# Patient Record
Sex: MALE | Race: WHITE | Employment: OTHER | URBAN - METROPOLITAN AREA
[De-identification: names, ages, dates, MRNs, and addresses within clinical notes are randomized per-mention and may not be internally consistent; named-entity substitution may affect disease eponyms.]

---

## 2017-01-05 ENCOUNTER — OFFICE VISIT (OUTPATIENT)
Dept: CARDIOLOGY CLINIC | Age: 77
End: 2017-01-05

## 2017-01-05 VITALS
SYSTOLIC BLOOD PRESSURE: 138 MMHG | HEART RATE: 82 BPM | RESPIRATION RATE: 16 BRPM | HEIGHT: 67 IN | DIASTOLIC BLOOD PRESSURE: 82 MMHG | BODY MASS INDEX: 32.83 KG/M2 | WEIGHT: 209.2 LBS

## 2017-01-05 DIAGNOSIS — J90 PLEURAL EFFUSION: ICD-10-CM

## 2017-01-05 DIAGNOSIS — R06.02 SOB (SHORTNESS OF BREATH) ON EXERTION: Primary | ICD-10-CM

## 2017-01-05 DIAGNOSIS — I10 ESSENTIAL HYPERTENSION: ICD-10-CM

## 2017-01-05 DIAGNOSIS — Z95.1 S/P CABG X 3: ICD-10-CM

## 2017-01-05 DIAGNOSIS — E11.9 CONTROLLED TYPE 2 DIABETES MELLITUS WITHOUT COMPLICATION, WITHOUT LONG-TERM CURRENT USE OF INSULIN (HCC): ICD-10-CM

## 2017-01-05 DIAGNOSIS — H18.519 FUCHS' CORNEAL DYSTROPHY: ICD-10-CM

## 2017-01-05 DIAGNOSIS — E11.40 TYPE 2 DIABETES, CONTROLLED, WITH NEUROPATHY (HCC): ICD-10-CM

## 2017-01-05 DIAGNOSIS — Z95.2 S/P AVR (AORTIC VALVE REPLACEMENT): ICD-10-CM

## 2017-01-05 DIAGNOSIS — R00.0 TACHYCARDIA: ICD-10-CM

## 2017-01-05 PROCEDURE — 93000 ELECTROCARDIOGRAM COMPLETE: CPT | Performed by: INTERNAL MEDICINE

## 2017-01-05 PROCEDURE — 99214 OFFICE O/P EST MOD 30 MIN: CPT | Performed by: INTERNAL MEDICINE

## 2017-03-30 ENCOUNTER — OFFICE VISIT (OUTPATIENT)
Dept: INTERNAL MEDICINE CLINIC | Age: 77
End: 2017-03-30

## 2017-03-30 VITALS
HEART RATE: 60 BPM | BODY MASS INDEX: 31.39 KG/M2 | SYSTOLIC BLOOD PRESSURE: 124 MMHG | RESPIRATION RATE: 16 BRPM | WEIGHT: 200 LBS | HEIGHT: 67 IN | DIASTOLIC BLOOD PRESSURE: 70 MMHG

## 2017-03-30 DIAGNOSIS — I10 ESSENTIAL HYPERTENSION: ICD-10-CM

## 2017-03-30 DIAGNOSIS — E78.00 HYPERCHOLESTEREMIA: ICD-10-CM

## 2017-03-30 DIAGNOSIS — J41.0 SIMPLE CHRONIC BRONCHITIS (HCC): ICD-10-CM

## 2017-03-30 DIAGNOSIS — E11.40 TYPE 2 DIABETES, CONTROLLED, WITH NEUROPATHY (HCC): Primary | ICD-10-CM

## 2017-03-30 DIAGNOSIS — I25.810 CORONARY ARTERY DISEASE INVOLVING CORONARY BYPASS GRAFT OF NATIVE HEART WITHOUT ANGINA PECTORIS: ICD-10-CM

## 2017-03-30 DIAGNOSIS — R09.81 NASAL CONGESTION: ICD-10-CM

## 2017-03-30 DIAGNOSIS — E11.40 TYPE 2 DIABETES, CONTROLLED, WITH NEUROPATHY (HCC): ICD-10-CM

## 2017-03-30 PROBLEM — J90 PLEURAL EFFUSION: Status: RESOLVED | Noted: 2017-01-05 | Resolved: 2017-03-30

## 2017-03-30 PROBLEM — R06.02 SOB (SHORTNESS OF BREATH) ON EXERTION: Status: RESOLVED | Noted: 2017-01-05 | Resolved: 2017-03-30

## 2017-03-30 PROBLEM — R00.0 TACHYCARDIA: Status: RESOLVED | Noted: 2017-01-05 | Resolved: 2017-03-30

## 2017-03-30 LAB
A/G RATIO: 1.7 (ref 1.1–2.2)
ALBUMIN SERPL-MCNC: 4.3 G/DL (ref 3.4–5)
ALP BLD-CCNC: 76 U/L (ref 40–129)
ALT SERPL-CCNC: 20 U/L (ref 10–40)
ANION GAP SERPL CALCULATED.3IONS-SCNC: 14 MMOL/L (ref 3–16)
AST SERPL-CCNC: 25 U/L (ref 15–37)
BASOPHILS ABSOLUTE: 0.1 K/UL (ref 0–0.2)
BASOPHILS RELATIVE PERCENT: 1.2 %
BILIRUB SERPL-MCNC: 1 MG/DL (ref 0–1)
BUN BLDV-MCNC: 14 MG/DL (ref 7–20)
CALCIUM SERPL-MCNC: 8.9 MG/DL (ref 8.3–10.6)
CHLORIDE BLD-SCNC: 97 MMOL/L (ref 99–110)
CHOLESTEROL, TOTAL: 103 MG/DL (ref 0–199)
CO2: 28 MMOL/L (ref 21–32)
CREAT SERPL-MCNC: 0.7 MG/DL (ref 0.8–1.3)
CREATININE URINE: 188.9 MG/DL (ref 39–259)
EOSINOPHILS ABSOLUTE: 0.2 K/UL (ref 0–0.6)
EOSINOPHILS RELATIVE PERCENT: 2.1 %
GFR AFRICAN AMERICAN: >60
GFR NON-AFRICAN AMERICAN: >60
GLOBULIN: 2.6 G/DL
GLUCOSE BLD-MCNC: 110 MG/DL (ref 70–99)
HCT VFR BLD CALC: 43 % (ref 40.5–52.5)
HDLC SERPL-MCNC: 46 MG/DL (ref 40–60)
HEMOGLOBIN: 13.9 G/DL (ref 13.5–17.5)
LDL CHOLESTEROL CALCULATED: 36 MG/DL
LYMPHOCYTES ABSOLUTE: 2.7 K/UL (ref 1–5.1)
LYMPHOCYTES RELATIVE PERCENT: 31.4 %
MCH RBC QN AUTO: 28.1 PG (ref 26–34)
MCHC RBC AUTO-ENTMCNC: 32.4 G/DL (ref 31–36)
MCV RBC AUTO: 86.8 FL (ref 80–100)
MICROALBUMIN UR-MCNC: <1.2 MG/DL
MICROALBUMIN/CREAT UR-RTO: NORMAL MG/G (ref 0–30)
MONOCYTES ABSOLUTE: 0.7 K/UL (ref 0–1.3)
MONOCYTES RELATIVE PERCENT: 8.6 %
NEUTROPHILS ABSOLUTE: 4.8 K/UL (ref 1.7–7.7)
NEUTROPHILS RELATIVE PERCENT: 56.7 %
PDW BLD-RTO: 18.5 % (ref 12.4–15.4)
PLATELET # BLD: 190 K/UL (ref 135–450)
PMV BLD AUTO: 7.5 FL (ref 5–10.5)
POTASSIUM SERPL-SCNC: 4.2 MMOL/L (ref 3.5–5.1)
RBC # BLD: 4.96 M/UL (ref 4.2–5.9)
SODIUM BLD-SCNC: 139 MMOL/L (ref 136–145)
TOTAL PROTEIN: 6.9 G/DL (ref 6.4–8.2)
TRIGL SERPL-MCNC: 103 MG/DL (ref 0–150)
VLDLC SERPL CALC-MCNC: 21 MG/DL
WBC # BLD: 8.5 K/UL (ref 4–11)

## 2017-03-30 PROCEDURE — 99214 OFFICE O/P EST MOD 30 MIN: CPT | Performed by: INTERNAL MEDICINE

## 2017-03-30 RX ORDER — CARVEDILOL 25 MG/1
25 TABLET ORAL 2 TIMES DAILY
Qty: 180 TABLET | Refills: 3 | Status: SHIPPED | OUTPATIENT
Start: 2017-03-30 | End: 2018-01-22 | Stop reason: SDUPTHER

## 2017-03-30 RX ORDER — DILTIAZEM HYDROCHLORIDE 120 MG/1
120 CAPSULE, COATED, EXTENDED RELEASE ORAL DAILY
Qty: 90 CAPSULE | Refills: 3 | Status: SHIPPED | OUTPATIENT
Start: 2017-03-30 | End: 2017-07-10 | Stop reason: SDUPTHER

## 2017-03-30 RX ORDER — ATORVASTATIN CALCIUM 40 MG/1
40 TABLET, FILM COATED ORAL DAILY
Qty: 30 TABLET | Refills: 11 | Status: SHIPPED | OUTPATIENT
Start: 2017-03-30 | End: 2018-03-29 | Stop reason: SDUPTHER

## 2017-03-30 RX ORDER — ALBUTEROL SULFATE 90 UG/1
2 AEROSOL, METERED RESPIRATORY (INHALATION) EVERY 6 HOURS PRN
Qty: 1 INHALER | Refills: 3
Start: 2017-03-30 | End: 2019-06-26 | Stop reason: SDUPTHER

## 2017-03-30 RX ORDER — FUROSEMIDE 40 MG/1
40 TABLET ORAL DAILY
Qty: 60 TABLET | Refills: 1 | Status: SHIPPED | OUTPATIENT
Start: 2017-03-30 | End: 2017-06-20 | Stop reason: SDUPTHER

## 2017-03-30 RX ORDER — IPRATROPIUM BROMIDE 21 UG/1
2 SPRAY, METERED NASAL 3 TIMES DAILY
Qty: 1 BOTTLE | Refills: 3 | Status: SHIPPED | OUTPATIENT
Start: 2017-03-30 | End: 2017-06-28 | Stop reason: ALTCHOICE

## 2017-03-31 LAB
ESTIMATED AVERAGE GLUCOSE: 131.2 MG/DL
HBA1C MFR BLD: 6.2 %

## 2017-06-20 RX ORDER — FUROSEMIDE 40 MG/1
40 TABLET ORAL DAILY
Qty: 90 TABLET | Refills: 1 | Status: SHIPPED | OUTPATIENT
Start: 2017-06-20 | End: 2018-01-27 | Stop reason: SDUPTHER

## 2017-06-28 ENCOUNTER — OFFICE VISIT (OUTPATIENT)
Dept: INTERNAL MEDICINE CLINIC | Age: 77
End: 2017-06-28

## 2017-06-28 VITALS
HEART RATE: 74 BPM | RESPIRATION RATE: 16 BRPM | OXYGEN SATURATION: 94 % | DIASTOLIC BLOOD PRESSURE: 80 MMHG | BODY MASS INDEX: 32.14 KG/M2 | WEIGHT: 205.2 LBS | SYSTOLIC BLOOD PRESSURE: 136 MMHG

## 2017-06-28 DIAGNOSIS — E11.40 TYPE 2 DIABETES, CONTROLLED, WITH NEUROPATHY (HCC): ICD-10-CM

## 2017-06-28 DIAGNOSIS — R41.3 MEMORY DEFICIT: ICD-10-CM

## 2017-06-28 DIAGNOSIS — I10 ESSENTIAL HYPERTENSION: ICD-10-CM

## 2017-06-28 DIAGNOSIS — J41.0 SIMPLE CHRONIC BRONCHITIS (HCC): ICD-10-CM

## 2017-06-28 DIAGNOSIS — R41.3 MEMORY DEFICIT: Primary | ICD-10-CM

## 2017-06-28 LAB
A/G RATIO: 1.6 (ref 1.1–2.2)
ALBUMIN SERPL-MCNC: 4.3 G/DL (ref 3.4–5)
ALP BLD-CCNC: 74 U/L (ref 40–129)
ALT SERPL-CCNC: 17 U/L (ref 10–40)
ANION GAP SERPL CALCULATED.3IONS-SCNC: 15 MMOL/L (ref 3–16)
AST SERPL-CCNC: 25 U/L (ref 15–37)
BILIRUB SERPL-MCNC: 1.1 MG/DL (ref 0–1)
BUN BLDV-MCNC: 13 MG/DL (ref 7–20)
CALCIUM SERPL-MCNC: 9.1 MG/DL (ref 8.3–10.6)
CHLORIDE BLD-SCNC: 96 MMOL/L (ref 99–110)
CO2: 28 MMOL/L (ref 21–32)
CREAT SERPL-MCNC: 0.7 MG/DL (ref 0.8–1.3)
GFR AFRICAN AMERICAN: >60
GFR NON-AFRICAN AMERICAN: >60
GLOBULIN: 2.7 G/DL
GLUCOSE BLD-MCNC: 101 MG/DL (ref 70–99)
POTASSIUM SERPL-SCNC: 4.2 MMOL/L (ref 3.5–5.1)
SODIUM BLD-SCNC: 139 MMOL/L (ref 136–145)
TOTAL PROTEIN: 7 G/DL (ref 6.4–8.2)
TSH REFLEX: 1.03 UIU/ML (ref 0.27–4.2)
VITAMIN B-12: 519 PG/ML (ref 211–911)

## 2017-06-28 PROCEDURE — 99214 OFFICE O/P EST MOD 30 MIN: CPT | Performed by: INTERNAL MEDICINE

## 2017-06-29 LAB
ESTIMATED AVERAGE GLUCOSE: 122.6 MG/DL
HBA1C MFR BLD: 5.9 %

## 2017-07-06 ENCOUNTER — HOSPITAL ENCOUNTER (OUTPATIENT)
Dept: CT IMAGING | Age: 77
Discharge: HOME | End: 2017-07-06
Attending: INTERNAL MEDICINE | Admitting: INTERNAL MEDICINE

## 2017-07-06 DIAGNOSIS — R41.3 MEMORY DEFICIT: ICD-10-CM

## 2017-07-10 ENCOUNTER — OFFICE VISIT (OUTPATIENT)
Dept: CARDIOLOGY CLINIC | Age: 77
End: 2017-07-10

## 2017-07-10 VITALS
SYSTOLIC BLOOD PRESSURE: 138 MMHG | DIASTOLIC BLOOD PRESSURE: 78 MMHG | BODY MASS INDEX: 32.65 KG/M2 | HEART RATE: 76 BPM | RESPIRATION RATE: 16 BRPM | WEIGHT: 208 LBS | HEIGHT: 67 IN

## 2017-07-10 DIAGNOSIS — I10 ESSENTIAL HYPERTENSION: ICD-10-CM

## 2017-07-10 DIAGNOSIS — Z95.1 S/P CABG X 3: ICD-10-CM

## 2017-07-10 DIAGNOSIS — Z95.2 S/P AVR (AORTIC VALVE REPLACEMENT): ICD-10-CM

## 2017-07-10 DIAGNOSIS — I25.10 CORONARY ARTERY DISEASE INVOLVING NATIVE CORONARY ARTERY OF NATIVE HEART WITHOUT ANGINA PECTORIS: Primary | ICD-10-CM

## 2017-07-10 DIAGNOSIS — E78.00 HYPERCHOLESTEREMIA: ICD-10-CM

## 2017-07-10 PROCEDURE — 99214 OFFICE O/P EST MOD 30 MIN: CPT | Performed by: INTERNAL MEDICINE

## 2017-07-10 RX ORDER — DILTIAZEM HYDROCHLORIDE 120 MG/1
120 CAPSULE, COATED, EXTENDED RELEASE ORAL DAILY
Qty: 90 CAPSULE | Refills: 3 | Status: SHIPPED | OUTPATIENT
Start: 2017-07-10 | End: 2018-09-21 | Stop reason: SDUPTHER

## 2017-07-12 ENCOUNTER — OFFICE VISIT (OUTPATIENT)
Dept: INTERNAL MEDICINE CLINIC | Age: 77
End: 2017-07-12

## 2017-07-12 VITALS
SYSTOLIC BLOOD PRESSURE: 124 MMHG | OXYGEN SATURATION: 95 % | DIASTOLIC BLOOD PRESSURE: 70 MMHG | HEART RATE: 77 BPM | RESPIRATION RATE: 16 BRPM

## 2017-07-12 DIAGNOSIS — R41.3 MEMORY DEFICIT: Primary | ICD-10-CM

## 2017-07-12 PROCEDURE — 99213 OFFICE O/P EST LOW 20 MIN: CPT | Performed by: INTERNAL MEDICINE

## 2017-07-12 RX ORDER — DONEPEZIL HYDROCHLORIDE 5 MG/1
5 TABLET, FILM COATED ORAL NIGHTLY
Qty: 90 TABLET | Refills: 0 | Status: SHIPPED | OUTPATIENT
Start: 2017-07-12 | End: 2017-10-08 | Stop reason: SDUPTHER

## 2017-09-20 DIAGNOSIS — K21.9 GASTROESOPHAGEAL REFLUX DISEASE WITHOUT ESOPHAGITIS: ICD-10-CM

## 2017-09-20 RX ORDER — FAMOTIDINE 20 MG/1
TABLET, FILM COATED ORAL
Qty: 180 TABLET | Refills: 3 | Status: SHIPPED | OUTPATIENT
Start: 2017-09-20 | End: 2018-09-13 | Stop reason: SDUPTHER

## 2017-10-08 DIAGNOSIS — R41.3 MEMORY DEFICIT: ICD-10-CM

## 2017-10-09 RX ORDER — DONEPEZIL HYDROCHLORIDE 5 MG/1
TABLET, FILM COATED ORAL
Qty: 90 TABLET | Refills: 0 | Status: SHIPPED | OUTPATIENT
Start: 2017-10-09 | End: 2017-10-10

## 2017-10-10 ENCOUNTER — OFFICE VISIT (OUTPATIENT)
Dept: INTERNAL MEDICINE CLINIC | Age: 77
End: 2017-10-10

## 2017-10-10 VITALS
OXYGEN SATURATION: 92 % | HEIGHT: 67 IN | DIASTOLIC BLOOD PRESSURE: 74 MMHG | HEART RATE: 86 BPM | BODY MASS INDEX: 33.59 KG/M2 | WEIGHT: 214 LBS | SYSTOLIC BLOOD PRESSURE: 138 MMHG

## 2017-10-10 DIAGNOSIS — E78.00 HYPERCHOLESTEREMIA: ICD-10-CM

## 2017-10-10 DIAGNOSIS — J41.0 SIMPLE CHRONIC BRONCHITIS (HCC): ICD-10-CM

## 2017-10-10 DIAGNOSIS — I10 ESSENTIAL HYPERTENSION: ICD-10-CM

## 2017-10-10 DIAGNOSIS — E11.9 CONTROLLED TYPE 2 DIABETES MELLITUS WITHOUT COMPLICATION, WITHOUT LONG-TERM CURRENT USE OF INSULIN (HCC): Primary | ICD-10-CM

## 2017-10-10 DIAGNOSIS — I25.10 CORONARY ARTERY DISEASE INVOLVING NATIVE CORONARY ARTERY OF NATIVE HEART WITHOUT ANGINA PECTORIS: ICD-10-CM

## 2017-10-10 PROCEDURE — G0008 ADMIN INFLUENZA VIRUS VAC: HCPCS | Performed by: INTERNAL MEDICINE

## 2017-10-10 PROCEDURE — 90662 IIV NO PRSV INCREASED AG IM: CPT | Performed by: INTERNAL MEDICINE

## 2017-10-10 PROCEDURE — 99214 OFFICE O/P EST MOD 30 MIN: CPT | Performed by: INTERNAL MEDICINE

## 2017-10-10 NOTE — PROGRESS NOTES
without long-term current use of insulin ([facility]    2. Simple chronic bronchitis ([facility]    3. Essential hypertension    4. Coronary artery disease involving native coronary artery of native heart without angina pectoris    5. Hypercholesteremia        PLAN:    Stefany Rodriguez was seen today for diabetes and memory loss. Diagnoses and all orders for this visit:    Controlled type 2 diabetes mellitus without complication, without long-term current use of insulin ([facility] - seems to be doing very well; check labs, no change  -     Hemoglobin A1C; Future  -     Lipid Panel; Future  -     Comprehensive Metabolic Panel; Future    Simple chronic bronchitis (HCC) - use albuterol PRN  -     Lipid Panel; Future  -     Comprehensive Metabolic Panel; Future    Essential hypertension - at goal; no change   -     Lipid Panel; Future  -     CBC Auto Differential; Future  -     Comprehensive Metabolic Panel; Future    Coronary artery disease involving native coronary artery of native heart without angina pectoris - no current symptoms; check labs  -     Lipid Panel; Future  -     Comprehensive Metabolic Panel; Future    Hypercholesteremia - check labs, cont statin  -     Lipid Panel; Future  -     Comprehensive Metabolic Panel;  Future    Other orders  -     INFLUENZA, HIGH DOSE, 65 YRS +, IM, PF, PREFILL SYR, 0.5ML (FLUZONE HD)

## 2017-10-16 LAB
A/G RATIO: 2 (CALC) (ref 0.8–2.6)
ALBUMIN SERPL-MCNC: 4.7 G/DL
ALBUMIN SERPL-MCNC: 4.7 GM/DL (ref 3.5–5.2)
ALP BLD-CCNC: 72 U/L
ALP BLD-CCNC: 72 U/L (ref 23–144)
ALT SERPL-CCNC: 28 U/L
ALT SERPL-CCNC: 28 U/L (ref 0–60)
ANION GAP SERPL CALCULATED.3IONS-SCNC: NORMAL MMOL/L
AST SERPL-CCNC: 32 U/L
AST SERPL-CCNC: 32 U/L (ref 0–55)
AVERAGE GLUCOSE: NORMAL
BASOPHILS ABSOLUTE: 0.1 /ΜL
BASOPHILS ABSOLUTE: 0.1 K/MM3 (ref 0–0.3)
BASOPHILS RELATIVE PERCENT: 1 %
BASOPHILS RELATIVE PERCENT: 1 % (ref 0–2)
BILIRUB SERPL-MCNC: 1.1 MG/DL (ref 0.1–1.4)
BILIRUB SERPL-MCNC: 1.1 MG/DL (ref 0–1.2)
BUN / CREAT RATIO: 18 (CALC) (ref 7–25)
BUN BLDV-MCNC: 14 MG/DL
BUN BLDV-MCNC: 14 MG/DL (ref 3–29)
CALCIUM SERPL-MCNC: 8.9 MG/DL
CALCIUM SERPL-MCNC: 8.9 MG/DL (ref 8.5–10.5)
CHLORIDE BLD-SCNC: 99 MEQ/L (ref 96–110)
CHLORIDE BLD-SCNC: 99 MMOL/L
CHOLESTEROL, TOTAL: 99 MG/DL
CHOLESTEROL, TOTAL: 99 MG/DL
CHOLESTEROL/HDL RATIO: NORMAL
CO2: 32 MEQ/L (ref 19–32)
CO2: 32 MMOL/L
COMMENT: ABNORMAL
COPY(IES) SENT TO:: NORMAL
CREAT SERPL-MCNC: 0.8 MG/DL
CREAT SERPL-MCNC: 0.8 MG/DL
EOSINOPHILS ABSOLUTE: 0.2 /ΜL
EOSINOPHILS ABSOLUTE: 0.2 K/MM3 (ref 0–0.6)
EOSINOPHILS RELATIVE PERCENT: 3.4 %
EOSINOPHILS RELATIVE PERCENT: 3.4 % (ref 0–7)
GFR CALCULATED: 86
GFR SERPL CREATININE-BSD FRML MDRD: 86 ML/MIN/1.73M2
GLOBULIN: 2.3 GM/DL (CALC) (ref 1.9–3.6)
GLUCOSE BLD-MCNC: 115 MG/DL
GLUCOSE BLD-MCNC: 115 MG/DL
HBA1C MFR BLD: 5.6 %
HBA1C MFR BLD: 5.6 % TOTAL HGB
HCT VFR BLD CALC: 43.2 % (ref 41–50)
HCT VFR BLD CALC: 43.2 % (ref 41–53)
HDLC SERPL-MCNC: 49 MG/DL
HDLC SERPL-MCNC: 49 MG/DL (ref 35–70)
HEMOGLOBIN: 14.2 G/DL (ref 13.5–17.5)
HEMOGLOBIN: 14.2 G/DL (ref 13.8–17.2)
LDL CHOLESTEROL CALCULATED: 30 MG/DL (ref 0–160)
LDL CHOLESTEROL: 30 MG/DL (CALC)
LEUKOCYTES, UA: 6.9 K/MM3 (ref 3.8–10.8)
LYMPHOCYTES ABSOLUTE: 2.5 /ΜL
LYMPHOCYTES ABSOLUTE: 2.5 K/MM3 (ref 0.9–4.1)
LYMPHOCYTES RELATIVE PERCENT: 36.9 %
LYMPHOCYTES RELATIVE PERCENT: 36.9 % (ref 14–51)
MCH RBC QN AUTO: 28.4 PG
MCH RBC QN AUTO: 28.4 PG (ref 27–33)
MCHC RBC AUTO-ENTMCNC: 32.9 G/DL
MCHC RBC AUTO-ENTMCNC: 32.9 G/DL (ref 32–36)
MCV RBC AUTO: 86.4 FL
MCV RBC AUTO: 86.4 FL (ref 80–100)
MONOCYTES ABSOLUTE: 0.7 /ΜL
MONOCYTES ABSOLUTE: 0.7 K/MM3 (ref 0.2–1.1)
MONOCYTES RELATIVE PERCENT: 9.6 %
MONOCYTES RELATIVE PERCENT: 9.6 % (ref 0–14)
NEUTROPHILS ABSOLUTE: 3.4 /ΜL
NEUTROPHILS ABSOLUTE: 3.4 K/MM3 (ref 1.5–7.8)
NEUTROPHILS RELATIVE PERCENT: 49.1 %
PDW BLD-RTO: 18.6 %
PDW BLD-RTO: 18.6 % (ref 9–15)
PLATELET # BLD: 198 K/MM3 (ref 130–400)
PLATELET # BLD: 198 K/ΜL
PMV BLD AUTO: NORMAL FL
POTASSIUM SERPL-SCNC: 4.3 MEQ/L (ref 3.4–5.3)
POTASSIUM SERPL-SCNC: 4.3 MMOL/L
RBC # BLD: 5 10^6/ΜL
RBC # BLD: 5 M/MM3 (ref 4.4–5.8)
SEGMENTED NEUTROPHILS RELATIVE PERCENT: 49.1 % (ref 40–76)
SODIUM BLD-SCNC: 141 MEQ/L (ref 135–148)
SODIUM BLD-SCNC: 141 MMOL/L
TOTAL PROTEIN: 7
TOTAL PROTEIN: 7 GM/DL (ref 6–8.3)
TRIGL SERPL-MCNC: 100 MG/DL
TRIGL SERPL-MCNC: 100 MG/DL
VLDLC SERPL CALC-MCNC: 20 MG/DL
VLDLC SERPL CALC-MCNC: 20 MG/DL (CALC) (ref 4–38)
WBC # BLD: 6.9 10^3/ML

## 2017-10-17 DIAGNOSIS — E11.9 CONTROLLED TYPE 2 DIABETES MELLITUS WITHOUT COMPLICATION, WITHOUT LONG-TERM CURRENT USE OF INSULIN (HCC): ICD-10-CM

## 2017-10-17 DIAGNOSIS — J41.0 SIMPLE CHRONIC BRONCHITIS (HCC): ICD-10-CM

## 2017-10-17 DIAGNOSIS — E78.00 HYPERCHOLESTEREMIA: ICD-10-CM

## 2017-10-17 DIAGNOSIS — I10 ESSENTIAL HYPERTENSION: ICD-10-CM

## 2017-10-17 DIAGNOSIS — I25.10 CORONARY ARTERY DISEASE INVOLVING NATIVE CORONARY ARTERY OF NATIVE HEART WITHOUT ANGINA PECTORIS: ICD-10-CM

## 2018-01-22 DIAGNOSIS — I25.810 CORONARY ARTERY DISEASE INVOLVING CORONARY BYPASS GRAFT OF NATIVE HEART WITHOUT ANGINA PECTORIS: ICD-10-CM

## 2018-01-22 DIAGNOSIS — I10 ESSENTIAL HYPERTENSION: ICD-10-CM

## 2018-01-22 RX ORDER — CARVEDILOL 25 MG/1
25 TABLET ORAL 2 TIMES DAILY
Qty: 180 TABLET | Refills: 3 | Status: SHIPPED | OUTPATIENT
Start: 2018-01-22 | End: 2018-07-23 | Stop reason: SDUPTHER

## 2018-01-23 ENCOUNTER — OFFICE VISIT (OUTPATIENT)
Dept: INTERNAL MEDICINE CLINIC | Age: 78
End: 2018-01-23

## 2018-01-23 VITALS
WEIGHT: 223.8 LBS | SYSTOLIC BLOOD PRESSURE: 124 MMHG | RESPIRATION RATE: 16 BRPM | BODY MASS INDEX: 35.05 KG/M2 | HEART RATE: 80 BPM | DIASTOLIC BLOOD PRESSURE: 70 MMHG | OXYGEN SATURATION: 95 %

## 2018-01-23 DIAGNOSIS — J01.00 ACUTE NON-RECURRENT MAXILLARY SINUSITIS: Primary | ICD-10-CM

## 2018-01-23 PROCEDURE — 99213 OFFICE O/P EST LOW 20 MIN: CPT | Performed by: INTERNAL MEDICINE

## 2018-01-23 RX ORDER — AMOXICILLIN 500 MG/1
500 CAPSULE ORAL 3 TIMES DAILY
Qty: 30 CAPSULE | Refills: 0 | Status: SHIPPED | OUTPATIENT
Start: 2018-01-23 | End: 2018-02-02

## 2018-01-23 ASSESSMENT — PATIENT HEALTH QUESTIONNAIRE - PHQ9
1. LITTLE INTEREST OR PLEASURE IN DOING THINGS: 0
SUM OF ALL RESPONSES TO PHQ QUESTIONS 1-9: 0
2. FEELING DOWN, DEPRESSED OR HOPELESS: 0
SUM OF ALL RESPONSES TO PHQ9 QUESTIONS 1 & 2: 0

## 2018-01-23 NOTE — PROGRESS NOTES
Izabelazenaida Lonnie  1940 01/23/18    SUBJECTIVE:    Pt with epistaxis intermittently over the last 3-4 weeks, nasal congestion, sinus headaches, ear congestion. Epistaxis has been from both nostrils. He denies cough, wheezing, SOB, F/C. He has used flonase. OBJECTIVE:    /70   Pulse 80   Resp 16   Wt 223 lb 12.8 oz (101.5 kg)   SpO2 95%   BMI 35.05 kg/m²     Physical Exam   Constitutional: He appears well-developed. HENT:   Right Ear: External ear normal.   Left Ear: External ear normal.   Nose: Nose normal.   Mouth/Throat: No oropharyngeal exudate. Eyes: Conjunctivae are normal.   Cardiovascular: Normal rate, regular rhythm and normal heart sounds. Pulmonary/Chest: Effort normal and breath sounds normal.   Lymphadenopathy:     He has no cervical adenopathy. Neurological: He is alert. ASSESSMENT:    1. Acute non-recurrent maxillary sinusitis        PLAN:    Kirk Torres was seen today for sinus problem. Diagnoses and all orders for this visit:    Acute non-recurrent maxillary sinusitis - Tx with amox, sinus rinse, zyrtec. Stop flonase  -     amoxicillin (AMOXIL) 500 MG capsule;  Take 1 capsule by mouth 3 times daily for 10 days

## 2018-01-29 RX ORDER — FUROSEMIDE 40 MG/1
TABLET ORAL
Qty: 90 TABLET | Refills: 1 | Status: SHIPPED | OUTPATIENT
Start: 2018-01-29 | End: 2018-07-27 | Stop reason: SDUPTHER

## 2018-03-29 DIAGNOSIS — I25.810 CORONARY ARTERY DISEASE INVOLVING CORONARY BYPASS GRAFT OF NATIVE HEART WITHOUT ANGINA PECTORIS: ICD-10-CM

## 2018-03-29 RX ORDER — ATORVASTATIN CALCIUM 40 MG/1
TABLET, FILM COATED ORAL
Qty: 90 TABLET | Refills: 3 | Status: SHIPPED | OUTPATIENT
Start: 2018-03-29 | End: 2019-03-27 | Stop reason: SDUPTHER

## 2018-04-11 ENCOUNTER — OFFICE VISIT (OUTPATIENT)
Dept: INTERNAL MEDICINE CLINIC | Age: 78
End: 2018-04-11

## 2018-04-11 VITALS
HEART RATE: 76 BPM | BODY MASS INDEX: 34.77 KG/M2 | DIASTOLIC BLOOD PRESSURE: 70 MMHG | SYSTOLIC BLOOD PRESSURE: 124 MMHG | RESPIRATION RATE: 16 BRPM | WEIGHT: 222 LBS | OXYGEN SATURATION: 98 %

## 2018-04-11 DIAGNOSIS — I25.10 CORONARY ARTERY DISEASE INVOLVING NATIVE CORONARY ARTERY OF NATIVE HEART WITHOUT ANGINA PECTORIS: ICD-10-CM

## 2018-04-11 DIAGNOSIS — E78.00 HYPERCHOLESTEREMIA: ICD-10-CM

## 2018-04-11 DIAGNOSIS — E11.40 TYPE 2 DIABETES, CONTROLLED, WITH NEUROPATHY (HCC): ICD-10-CM

## 2018-04-11 DIAGNOSIS — I10 ESSENTIAL HYPERTENSION: Primary | ICD-10-CM

## 2018-04-11 PROCEDURE — 99214 OFFICE O/P EST MOD 30 MIN: CPT | Performed by: INTERNAL MEDICINE

## 2018-04-23 DIAGNOSIS — I10 ESSENTIAL HYPERTENSION: ICD-10-CM

## 2018-04-23 DIAGNOSIS — E78.00 HYPERCHOLESTEREMIA: ICD-10-CM

## 2018-04-23 DIAGNOSIS — E11.40 TYPE 2 DIABETES, CONTROLLED, WITH NEUROPATHY (HCC): ICD-10-CM

## 2018-04-23 LAB
A/G RATIO: 1.8 (ref 1.1–2.2)
ALBUMIN SERPL-MCNC: 4.2 G/DL (ref 3.4–5)
ALP BLD-CCNC: 64 U/L (ref 40–129)
ALT SERPL-CCNC: 21 U/L (ref 10–40)
ANION GAP SERPL CALCULATED.3IONS-SCNC: 12 MMOL/L (ref 3–16)
AST SERPL-CCNC: 26 U/L (ref 15–37)
BASOPHILS ABSOLUTE: 0.1 K/UL (ref 0–0.2)
BASOPHILS RELATIVE PERCENT: 0.9 %
BILIRUB SERPL-MCNC: 1.2 MG/DL (ref 0–1)
BUN BLDV-MCNC: 10 MG/DL (ref 7–20)
CALCIUM SERPL-MCNC: 9 MG/DL (ref 8.3–10.6)
CHLORIDE BLD-SCNC: 99 MMOL/L (ref 99–110)
CHOLESTEROL, TOTAL: 105 MG/DL (ref 0–199)
CO2: 28 MMOL/L (ref 21–32)
CREAT SERPL-MCNC: 0.6 MG/DL (ref 0.8–1.3)
EOSINOPHILS ABSOLUTE: 0.2 K/UL (ref 0–0.6)
EOSINOPHILS RELATIVE PERCENT: 2.9 %
GFR AFRICAN AMERICAN: >60
GFR NON-AFRICAN AMERICAN: >60
GLOBULIN: 2.3 G/DL
GLUCOSE BLD-MCNC: 133 MG/DL (ref 70–99)
HCT VFR BLD CALC: 42.6 % (ref 40.5–52.5)
HDLC SERPL-MCNC: 42 MG/DL (ref 40–60)
HEMOGLOBIN: 14.2 G/DL (ref 13.5–17.5)
LDL CHOLESTEROL CALCULATED: 40 MG/DL
LYMPHOCYTES ABSOLUTE: 2.3 K/UL (ref 1–5.1)
LYMPHOCYTES RELATIVE PERCENT: 34.9 %
MCH RBC QN AUTO: 29.6 PG (ref 26–34)
MCHC RBC AUTO-ENTMCNC: 33.4 G/DL (ref 31–36)
MCV RBC AUTO: 88.6 FL (ref 80–100)
MONOCYTES ABSOLUTE: 0.6 K/UL (ref 0–1.3)
MONOCYTES RELATIVE PERCENT: 8.7 %
NEUTROPHILS ABSOLUTE: 3.5 K/UL (ref 1.7–7.7)
NEUTROPHILS RELATIVE PERCENT: 52.6 %
PDW BLD-RTO: 17.7 % (ref 12.4–15.4)
PLATELET # BLD: 185 K/UL (ref 135–450)
PMV BLD AUTO: 7.7 FL (ref 5–10.5)
POTASSIUM SERPL-SCNC: 4.6 MMOL/L (ref 3.5–5.1)
RBC # BLD: 4.8 M/UL (ref 4.2–5.9)
SODIUM BLD-SCNC: 139 MMOL/L (ref 136–145)
TOTAL PROTEIN: 6.5 G/DL (ref 6.4–8.2)
TRIGL SERPL-MCNC: 115 MG/DL (ref 0–150)
VLDLC SERPL CALC-MCNC: 23 MG/DL
WBC # BLD: 6.7 K/UL (ref 4–11)

## 2018-04-24 LAB
ESTIMATED AVERAGE GLUCOSE: 139.9 MG/DL
HBA1C MFR BLD: 6.5 %

## 2018-07-23 DIAGNOSIS — I10 ESSENTIAL HYPERTENSION: ICD-10-CM

## 2018-07-23 DIAGNOSIS — I25.810 CORONARY ARTERY DISEASE INVOLVING CORONARY BYPASS GRAFT OF NATIVE HEART WITHOUT ANGINA PECTORIS: ICD-10-CM

## 2018-07-23 RX ORDER — CARVEDILOL 25 MG/1
25 TABLET ORAL 2 TIMES DAILY
Qty: 180 TABLET | Refills: 3 | Status: SHIPPED | OUTPATIENT
Start: 2018-07-23 | End: 2019-03-27 | Stop reason: SDUPTHER

## 2018-07-30 RX ORDER — FUROSEMIDE 40 MG/1
TABLET ORAL
Qty: 90 TABLET | Refills: 0 | Status: SHIPPED | OUTPATIENT
Start: 2018-07-30 | End: 2018-10-10 | Stop reason: SDUPTHER

## 2018-09-21 DIAGNOSIS — I10 ESSENTIAL HYPERTENSION: ICD-10-CM

## 2018-09-21 RX ORDER — DILTIAZEM HYDROCHLORIDE 120 MG/1
120 CAPSULE, COATED, EXTENDED RELEASE ORAL DAILY
Qty: 90 CAPSULE | Refills: 3 | Status: SHIPPED | OUTPATIENT
Start: 2018-09-21 | End: 2019-06-11 | Stop reason: SDUPTHER

## 2018-09-26 ENCOUNTER — OFFICE VISIT (OUTPATIENT)
Dept: CARDIOLOGY CLINIC | Age: 78
End: 2018-09-26
Payer: COMMERCIAL

## 2018-09-26 VITALS
SYSTOLIC BLOOD PRESSURE: 130 MMHG | DIASTOLIC BLOOD PRESSURE: 80 MMHG | HEART RATE: 80 BPM | HEIGHT: 67 IN | WEIGHT: 231 LBS | BODY MASS INDEX: 36.26 KG/M2

## 2018-09-26 DIAGNOSIS — I25.10 CORONARY ARTERY DISEASE INVOLVING NATIVE CORONARY ARTERY OF NATIVE HEART WITHOUT ANGINA PECTORIS: Primary | ICD-10-CM

## 2018-09-26 DIAGNOSIS — Z95.2 S/P AVR (AORTIC VALVE REPLACEMENT): ICD-10-CM

## 2018-09-26 DIAGNOSIS — I10 ESSENTIAL HYPERTENSION: ICD-10-CM

## 2018-09-26 DIAGNOSIS — I83.893 VARICOSE VEINS OF BOTH LEGS WITH EDEMA: ICD-10-CM

## 2018-09-26 DIAGNOSIS — Z98.890 S/P CARDIAC CATH: ICD-10-CM

## 2018-09-26 DIAGNOSIS — E78.00 HYPERCHOLESTEREMIA: ICD-10-CM

## 2018-09-26 DIAGNOSIS — Z95.1 S/P CABG X 3: ICD-10-CM

## 2018-09-26 DIAGNOSIS — E11.40 TYPE 2 DIABETES, CONTROLLED, WITH NEUROPATHY (HCC): ICD-10-CM

## 2018-09-26 DIAGNOSIS — I38 VHD (VALVULAR HEART DISEASE): ICD-10-CM

## 2018-09-26 PROCEDURE — 99214 OFFICE O/P EST MOD 30 MIN: CPT | Performed by: INTERNAL MEDICINE

## 2018-09-26 NOTE — LETTER
[address] [facility]  [address] [place] [place] [address]  Phone: [phone]  Fax: [phone]    Cindi Santiago MD        September 26, 2018     Tete Shay MD  [address] [place] [address]    Patient: Vini Solano  MR Number: [id number]  YOB: 1940  Date of Visit: 9/26/2018    Dear Dr. Tete Shay:    Thank you for the request for consultation for Jazmyn Donis to me for the evaluation of CAD / VHD. Below are the relevant portions of my assessment and plan of care. If you have questions, please do not hesitate to call me. I look forward to following Witt Majors along with you.     Sincerely,        Cindi Santiago MD

## 2018-09-26 NOTE — PROGRESS NOTES
exertion     SOB (shortness of breath) on exertion 1/5/2017    Urinary frequency     Wears glasses      Past Surgical History:   Procedure Laterality Date    BACK SURGERY  3/15     DR. Fang - L2-S1 laminectomy and forototomy with L4-S1 fusion and fixation    CARDIAC CATHETERIZATION  06/21/2016   [facility] CARDIAC SURGERY  07/12/2016    Aortiv valve repair # 22 medtronic tissue valve, CABG x3 SVG to circ, SVG to PDA and LIMA to LAD    CHOLECYSTECTOMY, LAPAROSCOPIC  2/10    Dr. Anne-Marie Scott      All Teeth Extracted In Past    ENDOSCOPY, COLON, DIAGNOSTIC  09/2013   [facility] RECTAL SURGERY  1980's    Benign Rectal Cyst Removed    THORACENTESIS Right 07/14/2016    Dr Luis Marshall Memory  1940's      As reviewed   Family History   Problem Relation Age of Onset    Stroke Father     Heart Disease Father         \"Aortic Valve, 4 Bypasses\"   [facility] Alzheimer's Disease Mother     Stroke Mother     Depression Mother     Cancer Brother         Bladder Cancer    Heart Disease Brother         Heart Stents    Cancer Sister         Lung Cancer    Diabetes Son      Social History   Substance Use Topics    Smoking status: Former Smoker     Packs/day: 2.00     Years: 45.00     Types: Cigarettes, Pipe     Start date: 7/8/1955     Quit date: 7/8/2000    Smokeless tobacco: Never Used    Alcohol use 8.4 oz/week     14 Standard drinks or equivalent per week      Comment: \"2 Bottles Of [employer] A Day\"      Review of Systems:    Constitutional: Negative for diaphoresis and fatigue  Psychological:Negative for anxiety or depression  HENT: Negative for headaches, nasal congestion, sinus pain or vertigo  Eyes: Negative for visual disturbance.    Endocrine: Negative for polydipsia/polyuria  Respiratory: Negative for shortness of breath  Cardiovascular: Negative for chest pain, dyspnea on exertion, claudication, edema, irregular heartbeat, murmur, palpitations or shortness of breath  Gastrointestinal: Value Date    ALT 21 04/23/2018    AST 26 04/23/2018     BMP:    Lab Results   Component Value Date     04/23/2018    K 4.6 04/23/2018    CL 99 04/23/2018    CO2 28 04/23/2018    BUN 10 04/23/2018    CREATININE 0.6 04/23/2018     CMP:   Lab Results   Component Value Date     04/23/2018    K 4.6 04/23/2018    CL 99 04/23/2018    CO2 28 04/23/2018    BUN 10 04/23/2018    PROT 6.5 04/23/2018    PROT 7.3 09/25/2012     TSH:    Lab Results   Component Value Date    TSH 1.56 09/02/2016       QUALITY MEASURES REVIEWED:  1.CAD:Patient is taking anti platelet agent:Yes  2. DYSLIPIDEMIA: Patient is on cholesterol lowering medication:Yes  3. Beta-Blocker therapy for CAD, if prior Myocardial Infarction:Yes  4. Atrial fibrillation & anticoagulation therapy No  5. Discussed weight management strategies. Impression:    1. Coronary artery disease involving native coronary artery of native heart without angina pectoris    2. Essential hypertension    3. Type 2 diabetes, controlled, with neuropathy ([address]    4. Hypercholesteremia    5. VHD (valvular heart disease)    6. S/P cardiac cath    7. S/P CABG x 3    8. S/P AVR (aortic valve replacement)    9.  Varicose veins of both legs with edema       Patient Active Problem List   Diagnosis Code    HTN (hypertension) I10    Fuchs' corneal dystrophy H18.51    PUD (peptic ulcer disease) K27.9    Lumbar radiculopathy M54.16    Benign prostatic hyperplasia N40.0    Slow transit constipation K59.01    Foraminal stenosis of cervical region M99.81    Type 2 diabetes, controlled, with neuropathy ([address] E11.40    Hypercholesteremia E78.00    VHD (valvular heart disease) I38    Coronary artery disease involving native coronary artery of native heart without angina pectoris I25.10    S/P cardiac cath Z98.890    CAD (coronary artery disease) I25.10    S/P CABG x 3 Z95.1    S/P AVR (aortic valve replacement) Z95.2    Anemia D64.9    COPD (chronic obstructive pulmonary disease)

## 2018-10-10 ENCOUNTER — OFFICE VISIT (OUTPATIENT)
Dept: INTERNAL MEDICINE CLINIC | Age: 78
End: 2018-10-10
Payer: COMMERCIAL

## 2018-10-10 VITALS
WEIGHT: 234.4 LBS | RESPIRATION RATE: 16 BRPM | BODY MASS INDEX: 36.71 KG/M2 | SYSTOLIC BLOOD PRESSURE: 136 MMHG | OXYGEN SATURATION: 94 % | HEART RATE: 77 BPM | DIASTOLIC BLOOD PRESSURE: 70 MMHG

## 2018-10-10 DIAGNOSIS — E78.00 HYPERCHOLESTEREMIA: ICD-10-CM

## 2018-10-10 DIAGNOSIS — I10 ESSENTIAL HYPERTENSION: Primary | ICD-10-CM

## 2018-10-10 DIAGNOSIS — J41.0 SIMPLE CHRONIC BRONCHITIS (HCC): ICD-10-CM

## 2018-10-10 DIAGNOSIS — E11.40 TYPE 2 DIABETES, CONTROLLED, WITH NEUROPATHY (HCC): ICD-10-CM

## 2018-10-10 PROCEDURE — G0008 ADMIN INFLUENZA VIRUS VAC: HCPCS | Performed by: INTERNAL MEDICINE

## 2018-10-10 PROCEDURE — 99214 OFFICE O/P EST MOD 30 MIN: CPT | Performed by: INTERNAL MEDICINE

## 2018-10-10 PROCEDURE — 90662 IIV NO PRSV INCREASED AG IM: CPT | Performed by: INTERNAL MEDICINE

## 2018-10-10 RX ORDER — FUROSEMIDE 40 MG/1
40 TABLET ORAL DAILY
Qty: 90 TABLET | Refills: 3 | Status: SHIPPED | OUTPATIENT
Start: 2018-10-10 | End: 2019-06-26 | Stop reason: SDUPTHER

## 2018-10-11 ENCOUNTER — PROCEDURE VISIT (OUTPATIENT)
Dept: CARDIOLOGY CLINIC | Age: 78
End: 2018-10-11
Payer: COMMERCIAL

## 2018-10-11 DIAGNOSIS — E11.40 TYPE 2 DIABETES, CONTROLLED, WITH NEUROPATHY (HCC): ICD-10-CM

## 2018-10-11 DIAGNOSIS — Z98.890 S/P CARDIAC CATH: ICD-10-CM

## 2018-10-11 DIAGNOSIS — I38 VHD (VALVULAR HEART DISEASE): ICD-10-CM

## 2018-10-11 DIAGNOSIS — Z95.2 S/P AVR (AORTIC VALVE REPLACEMENT): ICD-10-CM

## 2018-10-11 DIAGNOSIS — I25.10 CORONARY ARTERY DISEASE INVOLVING NATIVE CORONARY ARTERY OF NATIVE HEART WITHOUT ANGINA PECTORIS: ICD-10-CM

## 2018-10-11 DIAGNOSIS — I10 ESSENTIAL HYPERTENSION: ICD-10-CM

## 2018-10-11 DIAGNOSIS — E78.00 HYPERCHOLESTEREMIA: ICD-10-CM

## 2018-10-11 DIAGNOSIS — Z95.1 S/P CABG X 3: ICD-10-CM

## 2018-10-11 LAB
LV EF: 64 %
LVEF MODALITY: NORMAL

## 2018-10-11 PROCEDURE — 93018 CV STRESS TEST I&R ONLY: CPT | Performed by: INTERNAL MEDICINE

## 2018-10-11 PROCEDURE — A9500 TC99M SESTAMIBI: HCPCS | Performed by: INTERNAL MEDICINE

## 2018-10-11 PROCEDURE — 93016 CV STRESS TEST SUPVJ ONLY: CPT | Performed by: INTERNAL MEDICINE

## 2018-10-11 PROCEDURE — 93017 CV STRESS TEST TRACING ONLY: CPT | Performed by: INTERNAL MEDICINE

## 2018-10-11 PROCEDURE — 78452 HT MUSCLE IMAGE SPECT MULT: CPT | Performed by: INTERNAL MEDICINE

## 2018-10-12 ENCOUNTER — TELEPHONE (OUTPATIENT)
Dept: CARDIOLOGY CLINIC | Age: 78
End: 2018-10-12

## 2018-10-12 NOTE — TELEPHONE ENCOUNTER
Left message with normal stress test results per [facility]. Normal perfusion study with normal distribution in all coronal, short, and horizontal axis. The observed defect is consistent with diaphragmatic attenuation. Normal LV function. LVEF is 64 %    Recommendation: Routine follow-up.

## 2018-10-15 ENCOUNTER — PROCEDURE VISIT (OUTPATIENT)
Dept: CARDIOLOGY CLINIC | Age: 78
End: 2018-10-15
Payer: COMMERCIAL

## 2018-10-15 DIAGNOSIS — I38 VHD (VALVULAR HEART DISEASE): ICD-10-CM

## 2018-10-15 DIAGNOSIS — Z98.890 S/P CARDIAC CATH: ICD-10-CM

## 2018-10-15 DIAGNOSIS — Z95.1 S/P CABG X 3: ICD-10-CM

## 2018-10-15 DIAGNOSIS — E11.40 TYPE 2 DIABETES, CONTROLLED, WITH NEUROPATHY (HCC): ICD-10-CM

## 2018-10-15 DIAGNOSIS — Z95.2 S/P AVR (AORTIC VALVE REPLACEMENT): Primary | ICD-10-CM

## 2018-10-15 DIAGNOSIS — I25.10 CORONARY ARTERY DISEASE INVOLVING NATIVE CORONARY ARTERY OF NATIVE HEART WITHOUT ANGINA PECTORIS: ICD-10-CM

## 2018-10-15 DIAGNOSIS — E78.00 HYPERCHOLESTEREMIA: ICD-10-CM

## 2018-10-15 DIAGNOSIS — I83.893 VARICOSE VEINS OF BOTH LEGS WITH EDEMA: Primary | ICD-10-CM

## 2018-10-15 DIAGNOSIS — I10 ESSENTIAL HYPERTENSION: ICD-10-CM

## 2018-10-15 LAB
LV EF: 53 %
LVEF MODALITY: NORMAL

## 2018-10-15 PROCEDURE — 93306 TTE W/DOPPLER COMPLETE: CPT | Performed by: INTERNAL MEDICINE

## 2018-10-15 PROCEDURE — 93970 EXTREMITY STUDY: CPT | Performed by: INTERNAL MEDICINE

## 2018-10-16 ENCOUNTER — TELEPHONE (OUTPATIENT)
Dept: CARDIOLOGY CLINIC | Age: 78
End: 2018-10-16

## 2018-10-31 ENCOUNTER — OFFICE VISIT (OUTPATIENT)
Dept: CARDIOLOGY CLINIC | Age: 78
End: 2018-10-31
Payer: COMMERCIAL

## 2018-10-31 VITALS
BODY MASS INDEX: 37.17 KG/M2 | DIASTOLIC BLOOD PRESSURE: 70 MMHG | WEIGHT: 236.8 LBS | SYSTOLIC BLOOD PRESSURE: 122 MMHG | HEIGHT: 67 IN | HEART RATE: 80 BPM

## 2018-10-31 DIAGNOSIS — I83.893 VARICOSE VEINS OF BOTH LEGS WITH EDEMA: ICD-10-CM

## 2018-10-31 DIAGNOSIS — Z95.2 S/P AVR (AORTIC VALVE REPLACEMENT): ICD-10-CM

## 2018-10-31 DIAGNOSIS — I10 ESSENTIAL HYPERTENSION: ICD-10-CM

## 2018-10-31 DIAGNOSIS — I25.10 CORONARY ARTERY DISEASE INVOLVING NATIVE CORONARY ARTERY OF NATIVE HEART WITHOUT ANGINA PECTORIS: Primary | ICD-10-CM

## 2018-10-31 DIAGNOSIS — I38 VHD (VALVULAR HEART DISEASE): ICD-10-CM

## 2018-10-31 DIAGNOSIS — E11.40 TYPE 2 DIABETES, CONTROLLED, WITH NEUROPATHY (HCC): ICD-10-CM

## 2018-10-31 DIAGNOSIS — Z95.1 S/P CABG X 3: ICD-10-CM

## 2018-10-31 DIAGNOSIS — E78.00 HYPERCHOLESTEREMIA: ICD-10-CM

## 2018-10-31 PROCEDURE — 99214 OFFICE O/P EST MOD 30 MIN: CPT | Performed by: INTERNAL MEDICINE

## 2018-10-31 NOTE — LETTER
[address] [facility]  [address] [place] [id number]  Phone: [phone]  Fax: [phone]    Sabrina Hayes MD        October 31, 2018     Freda Pallas, MD  [address] [facility] [address]    Patient: Yessy Coy  MR Number: [id number]  YOB: 1940  Date of Visit: 10/31/2018    Dear Dr. Freda Pallas:    Thank you for the request for consultation for Biren Clark to me for the evaluation of CAD. Below are the relevant portions of my assessment and plan of care. If you have questions, please do not hesitate to call me. I look forward to following Radha Torrez along with you.     Sincerely,        Sabrina Hayes MD

## 2018-11-12 ENCOUNTER — APPOINTMENT (OUTPATIENT)
Dept: CT IMAGING | Age: 78
DRG: 602 | End: 2018-11-12
Attending: INTERNAL MEDICINE
Payer: COMMERCIAL

## 2018-11-12 ENCOUNTER — OFFICE VISIT (OUTPATIENT)
Dept: INTERNAL MEDICINE CLINIC | Age: 78
End: 2018-11-12
Payer: COMMERCIAL

## 2018-11-12 ENCOUNTER — APPOINTMENT (OUTPATIENT)
Dept: GENERAL RADIOLOGY | Age: 78
DRG: 602 | End: 2018-11-12
Attending: INTERNAL MEDICINE
Payer: COMMERCIAL

## 2018-11-12 ENCOUNTER — HOSPITAL ENCOUNTER (INPATIENT)
Age: 78
LOS: 1 days | Discharge: HOME | DRG: 602 | End: 2018-11-13
Attending: INTERNAL MEDICINE | Admitting: INTERNAL MEDICINE
Payer: COMMERCIAL

## 2018-11-12 VITALS
TEMPERATURE: 98.2 F | OXYGEN SATURATION: 97 % | BODY MASS INDEX: 36.71 KG/M2 | HEART RATE: 69 BPM | DIASTOLIC BLOOD PRESSURE: 52 MMHG | WEIGHT: 234.4 LBS | SYSTOLIC BLOOD PRESSURE: 102 MMHG

## 2018-11-12 DIAGNOSIS — R50.9 FEVER, UNSPECIFIED FEVER CAUSE: Primary | ICD-10-CM

## 2018-11-12 PROBLEM — G93.41 METABOLIC ENCEPHALOPATHY: Status: ACTIVE | Noted: 2018-11-12

## 2018-11-12 LAB
ALBUMIN SERPL-MCNC: 4.1 GM/DL (ref 3.4–5)
ALP BLD-CCNC: 63 IU/L (ref 40–129)
ALT SERPL-CCNC: 15 U/L (ref 10–40)
ANION GAP SERPL CALCULATED.3IONS-SCNC: 11 MMOL/L (ref 4–16)
AST SERPL-CCNC: 19 IU/L (ref 15–37)
BACTERIA: NEGATIVE /HPF
BASOPHILS ABSOLUTE: 0.1 K/CU MM
BASOPHILS RELATIVE PERCENT: 0.7 % (ref 0–1)
BILIRUB SERPL-MCNC: 1.5 MG/DL (ref 0–1)
BILIRUBIN URINE: NEGATIVE MG/DL
BLOOD, URINE: NEGATIVE
BUN BLDV-MCNC: 20 MG/DL (ref 6–23)
CALCIUM SERPL-MCNC: 8.3 MG/DL (ref 8.3–10.6)
CELLULAR CASTS: 1 /LPF
CHLORIDE BLD-SCNC: 89 MMOL/L (ref 99–110)
CLARITY: CLEAR
CO2: 30 MMOL/L (ref 21–32)
COLOR: YELLOW
CREAT SERPL-MCNC: 1.1 MG/DL (ref 0.9–1.3)
DIFFERENTIAL TYPE: ABNORMAL
EOSINOPHILS ABSOLUTE: 0 K/CU MM
EOSINOPHILS RELATIVE PERCENT: 0.2 % (ref 0–3)
GFR AFRICAN AMERICAN: >60 ML/MIN/1.73M2
GFR NON-AFRICAN AMERICAN: >60 ML/MIN/1.73M2
GLUCOSE BLD-MCNC: 120 MG/DL (ref 70–99)
GLUCOSE, URINE: NEGATIVE MG/DL
HCT VFR BLD CALC: 40.6 % (ref 42–52)
HEMOGLOBIN: 13.3 GM/DL (ref 13.5–18)
HETEROPHILE ANTIBODIES: NEGATIVE
HYALINE CASTS: 5 /LPF
IMMATURE NEUTROPHIL %: 0.3 % (ref 0–0.43)
KETONES, URINE: NEGATIVE MG/DL
LEUKOCYTE ESTERASE, URINE: NEGATIVE
LYMPHOCYTES ABSOLUTE: 1.4 K/CU MM
LYMPHOCYTES RELATIVE PERCENT: 11 % (ref 24–44)
MCH RBC QN AUTO: 29.8 PG (ref 27–31)
MCHC RBC AUTO-ENTMCNC: 32.8 % (ref 32–36)
MCV RBC AUTO: 91 FL (ref 78–100)
MONOCYTES ABSOLUTE: 1 K/CU MM
MONOCYTES RELATIVE PERCENT: 7.5 % (ref 0–4)
MUCUS: ABNORMAL HPF
NITRITE URINE, QUANTITATIVE: NEGATIVE
NUCLEATED RBC %: 0 %
PDW BLD-RTO: 16.3 % (ref 11.7–14.9)
PH, URINE: 7 (ref 5–8)
PLATELET # BLD: 156 K/CU MM (ref 140–440)
PMV BLD AUTO: 9 FL (ref 7.5–11.1)
POTASSIUM SERPL-SCNC: 4 MMOL/L (ref 3.5–5.1)
PROTEIN UA: 30 MG/DL
RAPID INFLUENZA  B AGN: NEGATIVE
RAPID INFLUENZA A AGN: NEGATIVE
RBC # BLD: 4.46 M/CU MM (ref 4.6–6.2)
RBC URINE: ABNORMAL /HPF (ref 0–3)
SEGMENTED NEUTROPHILS ABSOLUTE COUNT: 10.3 K/CU MM
SEGMENTED NEUTROPHILS RELATIVE PERCENT: 80.3 % (ref 36–66)
SODIUM BLD-SCNC: 130 MMOL/L (ref 135–145)
SPECIFIC GRAVITY UA: 1.01 (ref 1–1.03)
TOTAL IMMATURE NEUTOROPHIL: 0.04 K/CU MM
TOTAL NUCLEATED RBC: 0 K/CU MM
TOTAL PROTEIN: 6.6 GM/DL (ref 6.4–8.2)
TRICHOMONAS: ABNORMAL /HPF
UROBILINOGEN, URINE: NORMAL MG/DL (ref 0.2–1)
WBC # BLD: 12.8 K/CU MM (ref 4–10.5)
WBC UA: <1 /HPF (ref 0–2)

## 2018-11-12 PROCEDURE — 36415 COLL VENOUS BLD VENIPUNCTURE: CPT

## 2018-11-12 PROCEDURE — 87804 INFLUENZA ASSAY W/OPTIC: CPT

## 2018-11-12 PROCEDURE — G0378 HOSPITAL OBSERVATION PER HR: HCPCS

## 2018-11-12 PROCEDURE — 1200000000 HC SEMI PRIVATE

## 2018-11-12 PROCEDURE — 71046 X-RAY EXAM CHEST 2 VIEWS: CPT

## 2018-11-12 PROCEDURE — 2580000003 HC RX 258: Performed by: INTERNAL MEDICINE

## 2018-11-12 PROCEDURE — 85025 COMPLETE CBC W/AUTO DIFF WBC: CPT

## 2018-11-12 PROCEDURE — 87086 URINE CULTURE/COLONY COUNT: CPT

## 2018-11-12 PROCEDURE — 70450 CT HEAD/BRAIN W/O DYE: CPT

## 2018-11-12 PROCEDURE — 80053 COMPREHEN METABOLIC PANEL: CPT

## 2018-11-12 PROCEDURE — 6360000002 HC RX W HCPCS: Performed by: INTERNAL MEDICINE

## 2018-11-12 PROCEDURE — 96372 THER/PROPH/DIAG INJ SC/IM: CPT

## 2018-11-12 PROCEDURE — 99214 OFFICE O/P EST MOD 30 MIN: CPT | Performed by: INTERNAL MEDICINE

## 2018-11-12 PROCEDURE — 87040 BLOOD CULTURE FOR BACTERIA: CPT

## 2018-11-12 PROCEDURE — 6370000000 HC RX 637 (ALT 250 FOR IP): Performed by: INTERNAL MEDICINE

## 2018-11-12 PROCEDURE — 86318 IA INFECTIOUS AGENT ANTIBODY: CPT

## 2018-11-12 PROCEDURE — 81001 URINALYSIS AUTO W/SCOPE: CPT

## 2018-11-12 RX ORDER — ASPIRIN 81 MG/1
81 TABLET ORAL NIGHTLY
Status: DISCONTINUED | OUTPATIENT
Start: 2018-11-12 | End: 2018-11-13 | Stop reason: HOSPADM

## 2018-11-12 RX ORDER — SODIUM CHLORIDE 0.9 % (FLUSH) 0.9 %
10 SYRINGE (ML) INJECTION EVERY 12 HOURS SCHEDULED
Status: DISCONTINUED | OUTPATIENT
Start: 2018-11-12 | End: 2018-11-13 | Stop reason: HOSPADM

## 2018-11-12 RX ORDER — CARVEDILOL 25 MG/1
25 TABLET ORAL 2 TIMES DAILY
Status: DISCONTINUED | OUTPATIENT
Start: 2018-11-12 | End: 2018-11-13 | Stop reason: HOSPADM

## 2018-11-12 RX ORDER — ONDANSETRON 2 MG/ML
4 INJECTION INTRAMUSCULAR; INTRAVENOUS EVERY 6 HOURS PRN
Status: DISCONTINUED | OUTPATIENT
Start: 2018-11-12 | End: 2018-11-13 | Stop reason: HOSPADM

## 2018-11-12 RX ORDER — FAMOTIDINE 20 MG/1
40 TABLET, FILM COATED ORAL DAILY
Status: DISCONTINUED | OUTPATIENT
Start: 2018-11-12 | End: 2018-11-13 | Stop reason: HOSPADM

## 2018-11-12 RX ORDER — ATORVASTATIN CALCIUM 40 MG/1
40 TABLET, FILM COATED ORAL NIGHTLY
Status: DISCONTINUED | OUTPATIENT
Start: 2018-11-12 | End: 2018-11-13 | Stop reason: HOSPADM

## 2018-11-12 RX ORDER — DILTIAZEM HYDROCHLORIDE 120 MG/1
120 CAPSULE, COATED, EXTENDED RELEASE ORAL DAILY
Status: DISCONTINUED | OUTPATIENT
Start: 2018-11-12 | End: 2018-11-13 | Stop reason: HOSPADM

## 2018-11-12 RX ORDER — SODIUM CHLORIDE 9 MG/ML
INJECTION, SOLUTION INTRAVENOUS CONTINUOUS
Status: DISCONTINUED | OUTPATIENT
Start: 2018-11-12 | End: 2018-11-13

## 2018-11-12 RX ORDER — SODIUM CHLORIDE 0.9 % (FLUSH) 0.9 %
10 SYRINGE (ML) INJECTION PRN
Status: DISCONTINUED | OUTPATIENT
Start: 2018-11-12 | End: 2018-11-13 | Stop reason: HOSPADM

## 2018-11-12 RX ADMIN — DILTIAZEM HYDROCHLORIDE 120 MG: 120 CAPSULE, COATED, EXTENDED RELEASE ORAL at 17:16

## 2018-11-12 RX ADMIN — SODIUM CHLORIDE: 9 INJECTION, SOLUTION INTRAVENOUS at 16:58

## 2018-11-12 RX ADMIN — ASPIRIN 81 MG: 81 TABLET, COATED ORAL at 21:43

## 2018-11-12 RX ADMIN — Medication 400 MG: at 18:16

## 2018-11-12 RX ADMIN — ATORVASTATIN CALCIUM 40 MG: 40 TABLET, FILM COATED ORAL at 21:42

## 2018-11-12 RX ADMIN — ENOXAPARIN SODIUM 40 MG: 40 INJECTION SUBCUTANEOUS at 17:16

## 2018-11-12 RX ADMIN — FAMOTIDINE 40 MG: 20 TABLET ORAL at 17:16

## 2018-11-12 ASSESSMENT — PAIN SCALES - GENERAL: PAINLEVEL_OUTOF10: 0

## 2018-11-13 VITALS
TEMPERATURE: 98.1 F | DIASTOLIC BLOOD PRESSURE: 54 MMHG | BODY MASS INDEX: 36.06 KG/M2 | HEIGHT: 67 IN | HEART RATE: 65 BPM | OXYGEN SATURATION: 94 % | WEIGHT: 229.72 LBS | SYSTOLIC BLOOD PRESSURE: 101 MMHG | RESPIRATION RATE: 16 BRPM

## 2018-11-13 PROBLEM — L03.115 CELLULITIS OF RIGHT LOWER EXTREMITY: Status: ACTIVE | Noted: 2018-11-13

## 2018-11-13 PROCEDURE — 96365 THER/PROPH/DIAG IV INF INIT: CPT

## 2018-11-13 PROCEDURE — 94664 DEMO&/EVAL PT USE INHALER: CPT

## 2018-11-13 PROCEDURE — G0378 HOSPITAL OBSERVATION PER HR: HCPCS

## 2018-11-13 PROCEDURE — 2580000003 HC RX 258: Performed by: INTERNAL MEDICINE

## 2018-11-13 PROCEDURE — 94640 AIRWAY INHALATION TREATMENT: CPT

## 2018-11-13 PROCEDURE — 6370000000 HC RX 637 (ALT 250 FOR IP): Performed by: INTERNAL MEDICINE

## 2018-11-13 PROCEDURE — 6360000002 HC RX W HCPCS: Performed by: INTERNAL MEDICINE

## 2018-11-13 PROCEDURE — 2500000003 HC RX 250 WO HCPCS: Performed by: INTERNAL MEDICINE

## 2018-11-13 PROCEDURE — 94761 N-INVAS EAR/PLS OXIMETRY MLT: CPT

## 2018-11-13 PROCEDURE — 99223 1ST HOSP IP/OBS HIGH 75: CPT | Performed by: INTERNAL MEDICINE

## 2018-11-13 RX ORDER — IPRATROPIUM BROMIDE AND ALBUTEROL SULFATE 2.5; .5 MG/3ML; MG/3ML
SOLUTION RESPIRATORY (INHALATION)
Status: DISCONTINUED
Start: 2018-11-13 | End: 2018-11-13 | Stop reason: HOSPADM

## 2018-11-13 RX ORDER — IPRATROPIUM BROMIDE AND ALBUTEROL SULFATE 2.5; .5 MG/3ML; MG/3ML
1 SOLUTION RESPIRATORY (INHALATION) ONCE
Status: COMPLETED | OUTPATIENT
Start: 2018-11-13 | End: 2018-11-13

## 2018-11-13 RX ORDER — CLINDAMYCIN HYDROCHLORIDE 300 MG/1
300 CAPSULE ORAL 3 TIMES DAILY
Qty: 30 CAPSULE | Refills: 0 | Status: SHIPPED | OUTPATIENT
Start: 2018-11-13 | End: 2018-11-23

## 2018-11-13 RX ADMIN — Medication 400 MG: at 08:22

## 2018-11-13 RX ADMIN — CLINDAMYCIN 300 MG: 150 INJECTION, SOLUTION INTRAMUSCULAR; INTRAVENOUS at 08:25

## 2018-11-13 RX ADMIN — SODIUM CHLORIDE: 9 INJECTION, SOLUTION INTRAVENOUS at 03:16

## 2018-11-13 RX ADMIN — FAMOTIDINE 40 MG: 20 TABLET ORAL at 08:22

## 2018-11-13 RX ADMIN — CARVEDILOL 25 MG: 25 TABLET, FILM COATED ORAL at 08:21

## 2018-11-13 RX ADMIN — DILTIAZEM HYDROCHLORIDE 120 MG: 120 CAPSULE, COATED, EXTENDED RELEASE ORAL at 08:22

## 2018-11-13 RX ADMIN — IPRATROPIUM BROMIDE AND ALBUTEROL SULFATE 1 AMPULE: .5; 3 SOLUTION RESPIRATORY (INHALATION) at 09:19

## 2018-11-13 RX ADMIN — ENOXAPARIN SODIUM 40 MG: 40 INJECTION SUBCUTANEOUS at 08:22

## 2018-11-13 ASSESSMENT — PAIN DESCRIPTION - DESCRIPTORS: DESCRIPTORS: SHOOTING;SHARP

## 2018-11-13 ASSESSMENT — PAIN DESCRIPTION - ONSET: ONSET: SUDDEN

## 2018-11-13 ASSESSMENT — PAIN DESCRIPTION - PROGRESSION: CLINICAL_PROGRESSION: NOT CHANGED

## 2018-11-13 ASSESSMENT — PAIN DESCRIPTION - FREQUENCY: FREQUENCY: INTERMITTENT

## 2018-11-13 ASSESSMENT — PAIN DESCRIPTION - PAIN TYPE: TYPE: CHRONIC PAIN

## 2018-11-13 ASSESSMENT — PAIN DESCRIPTION - LOCATION: LOCATION: ANKLE

## 2018-11-13 ASSESSMENT — PAIN DESCRIPTION - ORIENTATION: ORIENTATION: RIGHT

## 2018-11-13 ASSESSMENT — PAIN SCALES - GENERAL: PAINLEVEL_OUTOF10: 3

## 2018-11-13 NOTE — DISCHARGE SUMMARY
Patient ID: Patrice Jolley      Admit Date: 11/12/2018   Discharge Date: 11/13/2018    Hospital Diagnoses: Active Problems:    Metabolic encephalopathy    Cellulitis of right lower extremity  Resolved Problems:    * No resolved hospital problems. *    Discharge Diagnoses:   Patient Active Problem List   Diagnosis Code    HTN (hypertension) I10    Fuchs' corneal dystrophy H18.51    PUD (peptic ulcer disease) K27.9    Lumbar radiculopathy M54.16    Benign prostatic hyperplasia N40.0    Slow transit constipation K59.01    Foraminal stenosis of cervical region M99.81    Type 2 diabetes, controlled, with neuropathy ([address] E11.40    Hypercholesteremia E78.00    VHD (valvular heart disease) I38    Coronary artery disease involving native coronary artery of native heart without angina pectoris I25.10    S/P cardiac cath Z98.890    CAD (coronary artery disease) I25.10    S/P CABG x 3 Z95.1    S/P AVR (aortic valve replacement) Z95.2    Anemia D64.9    COPD (chronic obstructive pulmonary disease) ([facility] J44.9    Varicose veins of both legs with edema Q91.471    Metabolic encephalopathy Y24.74    Cellulitis of right lower extremity L03.115       Hospital Course:  Please see chart for full details of this hospitalization. Briefly, pt was admitted for encephalopathy and fever. W/u was essentially (-) except for a possible cellulitis of the right shin. He was started on clindamycin. Fever improved and confusion resolved, so he will be discharged home with  Close f/u.      Consults: none  Disposition: home  Condition at Discharge: improved  Follow Up: Dr Mendel Hoit this week    Discharge Medications:   Michelle Hugo   Home Medication Instructions [id number]    Printed on:11/13/18 1224   Medication Information                      albuterol sulfate  (90 BASE) MCG/ACT inhaler  Inhale 2 puffs into the lungs every 6 hours as needed for Wheezing             aspirin 81 MG EC tablet  Take 81 mg by mouth nightly Over The Counter             atorvastatin (LIPITOR) 40 MG tablet  TAKE ONE TABLET BY MOUTH DAILY             carvedilol (COREG) 25 MG tablet  Take 1 tablet by mouth 2 times daily             clindamycin (CLEOCIN) 300 MG capsule  Take 1 capsule by mouth 3 times daily for 10 days             Coenzyme Q10 (COQ10) 400 MG CAPS  Take by mouth every morning Over The Counter             diltiazem (CARDIZEM CD) 120 MG extended release capsule  Take 1 capsule by mouth daily             famotidine (PEPCID) 20 MG tablet  TAKE TWO TABLETS BY MOUTH DAILY             furosemide (LASIX) 40 MG tablet  Take 1 tablet by mouth daily             Magnesium 500 MG CAPS  Take by mouth every evening Over The Counter                Activity: as tolerated  Diet: CCD    Time Spent on admission and discharge is more than 1 hour discussing plan of care and discharge medications with patient and nursing staff    Signed:  Caridad Solo   11/13/2018

## 2018-11-13 NOTE — H&P
ventricular hypertrophy. The left atrium is mildly dilated. Mild dilatation of the aortic root. 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 No other significant valvulopathy seen. No evidence of pericardial effusion.  Lumbar spinal stenosis     9/13 MRI small left L2-3 herniation, severe central stenosis L4-5, mod central stenosis L2-3 and L3-4    Nocturia     PUD (peptic ulcer disease)     9/13 EGD 2 1cm duodenal ulcers, 1cm linear ulcer in antrum, <1cm ulcer in pyloric opening, erosive gastritis    S/P CABG x 3 07/12/2016    3-vessel and aortic valve     Shortness of breath on exertion     SOB (shortness of breath) on exertion 1/5/2017    Urinary frequency     Wears glasses        Past Surgical History:        Procedure Laterality Date    BACK SURGERY  3/15     DR. Fang - L2-S1 laminectomy and forototomy with L4-S1 fusion and fixation    CARDIAC CATHETERIZATION  06/21/2016   [facility] CARDIAC SURGERY  07/12/2016    Aortiv valve repair # 22 medtronic tissue valve, CABG x3 SVG to circ, SVG to PDA and LIMA to LAD    CHOLECYSTECTOMY, LAPAROSCOPIC  2/10    Dr. Lee Angel      All Teeth Extracted In Past    ENDOSCOPY, COLON, DIAGNOSTIC  09/2013   [facility] RECTAL SURGERY  1980's    Benign Rectal Cyst Removed    THORACENTESIS Right 07/14/2016    Dr Loy Ruelas  1940's       Medications Prior to Admission:    Prior to Admission medications    Medication Sig Start Date End Date Taking?  Authorizing Provider   clindamycin (CLEOCIN) 300 MG capsule Take 1 capsule by mouth 3 times daily for 10 days 11/13/18 11/23/18 Yes Clark Pascual MD   furosemide (LASIX) 40 MG tablet Take 1 tablet by mouth daily 10/10/18  Yes Clark Pascual MD   diltiazem (CARDIZEM CD) 120 MG extended release capsule Take 1 capsule by mouth daily 9/21/18  Yes Stew Puckett MD   famotidine (PEPCID) 20 MG tablet TAKE TWO

## 2018-11-14 ENCOUNTER — CARE COORDINATION (OUTPATIENT)
Dept: CARE COORDINATION | Age: 78
End: 2018-11-14

## 2018-11-14 ENCOUNTER — TELEPHONE (OUTPATIENT)
Dept: INTERNAL MEDICINE CLINIC | Age: 78
End: 2018-11-14

## 2018-11-14 LAB
CULTURE: NORMAL
Lab: NORMAL
REPORT STATUS: NORMAL
SPECIMEN: NORMAL

## 2018-11-17 LAB
CULTURE: NORMAL
CULTURE: NORMAL
Lab: NORMAL
Lab: NORMAL
REPORT STATUS: NORMAL
REPORT STATUS: NORMAL
SPECIMEN: NORMAL
SPECIMEN: NORMAL

## 2018-11-19 ENCOUNTER — OFFICE VISIT (OUTPATIENT)
Dept: INTERNAL MEDICINE CLINIC | Age: 78
End: 2018-11-19
Payer: COMMERCIAL

## 2018-11-19 VITALS
OXYGEN SATURATION: 94 % | BODY MASS INDEX: 36.65 KG/M2 | SYSTOLIC BLOOD PRESSURE: 144 MMHG | DIASTOLIC BLOOD PRESSURE: 76 MMHG | WEIGHT: 234 LBS | RESPIRATION RATE: 16 BRPM | HEART RATE: 77 BPM

## 2018-11-19 DIAGNOSIS — L03.115 CELLULITIS OF RIGHT LOWER EXTREMITY: ICD-10-CM

## 2018-11-19 DIAGNOSIS — L03.115 CELLULITIS OF RIGHT LOWER EXTREMITY: Primary | ICD-10-CM

## 2018-11-19 PROCEDURE — 99496 TRANSJ CARE MGMT HIGH F2F 7D: CPT | Performed by: INTERNAL MEDICINE

## 2018-11-19 PROCEDURE — 1111F DSCHRG MED/CURRENT MED MERGE: CPT | Performed by: INTERNAL MEDICINE

## 2018-11-19 NOTE — PROGRESS NOTES
reviewed- see chart. Interval history/Current status: he has developed significant swelling in the R>L leg that has been worse since the hospitalization. He cellulitis in the right lower leg is almost resolved. Appetite is normal.     Confusion has improved significantly. He is taking lasix daily. He has had some diarrhea with the clinda. A comprehensive review of systems was negative except for what was noted in the HPI. Vitals:    11/19/18 0956 11/19/18 0958   BP: (!) 144/76 (!) 144/76   Pulse: 77    Resp: 16    SpO2: 94%    Weight: 234 lb (106.1 kg)      Body mass index is 36.65 kg/m².    Wt Readings from Last 3 Encounters:   11/19/18 234 lb (106.1 kg)   11/13/18 229 lb 11.5 oz (104.2 kg)   11/12/18 234 lb 6.4 oz (106.3 kg)     BP Readings from Last 3 Encounters:   11/19/18 (!) 144/76   11/13/18 (!) 101/54   11/12/18 (!) 102/52        Physical Exam:  General Appearance: alert and oriented to person, place and time, well developed and well- nourished, in no acute distress  Skin: warm and dry, no rash or erythema  Head: normocephalic and atraumatic  Eyes: pupils equal, round, and reactive to light, extraocular eye movements intact, conjunctivae normal  ENT: tympanic membrane, external ear and ear canal normal bilaterally, nose without deformity, nasal mucosa and turbinates normal without polyps  Neck: supple and non-tender without mass, no thyromegaly or thyroid nodules, no cervical lymphadenopathy  Pulmonary/Chest: clear to auscultation bilaterally- no wheezes, rales or rhonchi, normal air movement, no respiratory distress  Cardiovascular: normal rate, regular VFSVLU,1/6 systolic murmurs, rubs, clicks, or gallops, distal pulses intact, no carotid bruits  Abdomen: soft, non-tender, non-distended, normal bowel sounds, no masses or organomegaly  Extremities: no cyanosis, clubbing; 1+ edema on the left; 3+ on the right  Musculoskeletal: normal range of motion, no joint swelling, deformity or

## 2018-11-20 DIAGNOSIS — E87.5 SERUM POTASSIUM ELEVATED: Primary | ICD-10-CM

## 2018-11-20 LAB
ANION GAP SERPL CALCULATED.3IONS-SCNC: 11 MMOL/L (ref 3–16)
BUN BLDV-MCNC: 13 MG/DL (ref 7–20)
CALCIUM SERPL-MCNC: 9.4 MG/DL (ref 8.3–10.6)
CHLORIDE BLD-SCNC: 99 MMOL/L (ref 99–110)
CO2: 29 MMOL/L (ref 21–32)
CREAT SERPL-MCNC: 0.8 MG/DL (ref 0.8–1.3)
GFR AFRICAN AMERICAN: >60
GFR NON-AFRICAN AMERICAN: >60
GLUCOSE BLD-MCNC: 157 MG/DL (ref 70–99)
POTASSIUM SERPL-SCNC: 5.5 MMOL/L (ref 3.5–5.1)
SODIUM BLD-SCNC: 139 MMOL/L (ref 136–145)

## 2018-11-26 DIAGNOSIS — E87.5 SERUM POTASSIUM ELEVATED: ICD-10-CM

## 2018-11-27 LAB
ANION GAP SERPL CALCULATED.3IONS-SCNC: 14 MMOL/L (ref 3–16)
BUN BLDV-MCNC: 13 MG/DL (ref 7–20)
CALCIUM SERPL-MCNC: 9.2 MG/DL (ref 8.3–10.6)
CHLORIDE BLD-SCNC: 97 MMOL/L (ref 99–110)
CO2: 27 MMOL/L (ref 21–32)
CREAT SERPL-MCNC: 0.9 MG/DL (ref 0.8–1.3)
GFR AFRICAN AMERICAN: >60
GFR NON-AFRICAN AMERICAN: >60
GLUCOSE BLD-MCNC: 165 MG/DL (ref 70–99)
POTASSIUM SERPL-SCNC: 5 MMOL/L (ref 3.5–5.1)
SODIUM BLD-SCNC: 138 MMOL/L (ref 136–145)

## 2019-01-23 ENCOUNTER — CARE COORDINATION (OUTPATIENT)
Dept: CARE COORDINATION | Age: 79
End: 2019-01-23

## 2019-01-29 ENCOUNTER — CARE COORDINATION (OUTPATIENT)
Dept: CARE COORDINATION | Age: 79
End: 2019-01-29

## 2019-03-01 ENCOUNTER — OFFICE VISIT (OUTPATIENT)
Dept: INTERNAL MEDICINE CLINIC | Age: 79
End: 2019-03-01
Payer: COMMERCIAL

## 2019-03-01 VITALS
DIASTOLIC BLOOD PRESSURE: 70 MMHG | WEIGHT: 234.4 LBS | BODY MASS INDEX: 33.56 KG/M2 | RESPIRATION RATE: 20 BRPM | OXYGEN SATURATION: 95 % | SYSTOLIC BLOOD PRESSURE: 130 MMHG | HEART RATE: 83 BPM | HEIGHT: 70 IN

## 2019-03-01 DIAGNOSIS — M53.3 SACRAL PAIN: Primary | ICD-10-CM

## 2019-03-01 PROCEDURE — G8510 SCR DEP NEG, NO PLAN REQD: HCPCS | Performed by: INTERNAL MEDICINE

## 2019-03-01 PROCEDURE — 99213 OFFICE O/P EST LOW 20 MIN: CPT | Performed by: INTERNAL MEDICINE

## 2019-03-01 ASSESSMENT — PATIENT HEALTH QUESTIONNAIRE - PHQ9
2. FEELING DOWN, DEPRESSED OR HOPELESS: 0
SUM OF ALL RESPONSES TO PHQ QUESTIONS 1-9: 0
DEPRESSION UNABLE TO ASSESS: FUNCTIONAL CAPACITY MOTIVATION LIMITS ACCURACY
SUM OF ALL RESPONSES TO PHQ QUESTIONS 1-9: 0
SUM OF ALL RESPONSES TO PHQ9 QUESTIONS 1 & 2: 0
1. LITTLE INTEREST OR PLEASURE IN DOING THINGS: 0

## 2019-03-14 ENCOUNTER — TELEPHONE (OUTPATIENT)
Dept: CARDIOLOGY CLINIC | Age: 79
End: 2019-03-14

## 2019-03-19 ENCOUNTER — TELEPHONE (OUTPATIENT)
Dept: CARDIOLOGY CLINIC | Age: 79
End: 2019-03-19

## 2019-03-27 ENCOUNTER — OFFICE VISIT (OUTPATIENT)
Dept: INTERNAL MEDICINE CLINIC | Age: 79
End: 2019-03-27
Payer: COMMERCIAL

## 2019-03-27 ENCOUNTER — CARE COORDINATION (OUTPATIENT)
Dept: CARE COORDINATION | Age: 79
End: 2019-03-27

## 2019-03-27 VITALS
OXYGEN SATURATION: 96 % | DIASTOLIC BLOOD PRESSURE: 70 MMHG | HEART RATE: 69 BPM | BODY MASS INDEX: 32.89 KG/M2 | RESPIRATION RATE: 18 BRPM | SYSTOLIC BLOOD PRESSURE: 122 MMHG | WEIGHT: 229.2 LBS

## 2019-03-27 DIAGNOSIS — K21.9 GASTROESOPHAGEAL REFLUX DISEASE WITHOUT ESOPHAGITIS: ICD-10-CM

## 2019-03-27 DIAGNOSIS — E78.00 HYPERCHOLESTEREMIA: ICD-10-CM

## 2019-03-27 DIAGNOSIS — I10 ESSENTIAL HYPERTENSION: ICD-10-CM

## 2019-03-27 DIAGNOSIS — J41.0 SIMPLE CHRONIC BRONCHITIS (HCC): ICD-10-CM

## 2019-03-27 DIAGNOSIS — E11.40 TYPE 2 DIABETES, CONTROLLED, WITH NEUROPATHY (HCC): ICD-10-CM

## 2019-03-27 DIAGNOSIS — E11.40 TYPE 2 DIABETES, CONTROLLED, WITH NEUROPATHY (HCC): Primary | ICD-10-CM

## 2019-03-27 DIAGNOSIS — I25.810 CORONARY ARTERY DISEASE INVOLVING CORONARY BYPASS GRAFT OF NATIVE HEART WITHOUT ANGINA PECTORIS: ICD-10-CM

## 2019-03-27 PROBLEM — G93.41 METABOLIC ENCEPHALOPATHY: Status: RESOLVED | Noted: 2018-11-12 | Resolved: 2019-03-27

## 2019-03-27 PROBLEM — L03.115 CELLULITIS OF RIGHT LOWER EXTREMITY: Status: RESOLVED | Noted: 2018-11-13 | Resolved: 2019-03-27

## 2019-03-27 LAB
A/G RATIO: 1.5 (ref 1.1–2.2)
ALBUMIN SERPL-MCNC: 4.3 G/DL (ref 3.4–5)
ALP BLD-CCNC: 80 U/L (ref 40–129)
ALT SERPL-CCNC: 21 U/L (ref 10–40)
ANION GAP SERPL CALCULATED.3IONS-SCNC: 8 MMOL/L (ref 3–16)
AST SERPL-CCNC: 26 U/L (ref 15–37)
BASOPHILS ABSOLUTE: 0.1 K/UL (ref 0–0.2)
BASOPHILS RELATIVE PERCENT: 0.9 %
BILIRUB SERPL-MCNC: 1.2 MG/DL (ref 0–1)
BUN BLDV-MCNC: 12 MG/DL (ref 7–20)
CALCIUM SERPL-MCNC: 9.3 MG/DL (ref 8.3–10.6)
CHLORIDE BLD-SCNC: 99 MMOL/L (ref 99–110)
CHOLESTEROL, TOTAL: 120 MG/DL (ref 0–199)
CO2: 32 MMOL/L (ref 21–32)
CREAT SERPL-MCNC: 0.8 MG/DL (ref 0.8–1.3)
EOSINOPHILS ABSOLUTE: 0.3 K/UL (ref 0–0.6)
EOSINOPHILS RELATIVE PERCENT: 4.3 %
GFR AFRICAN AMERICAN: >60
GFR NON-AFRICAN AMERICAN: >60
GLOBULIN: 2.8 G/DL
GLUCOSE BLD-MCNC: 147 MG/DL (ref 70–99)
HCT VFR BLD CALC: 38.6 % (ref 40.5–52.5)
HDLC SERPL-MCNC: 42 MG/DL (ref 40–60)
HEMOGLOBIN: 13 G/DL (ref 13.5–17.5)
LDL CHOLESTEROL CALCULATED: 47 MG/DL
LYMPHOCYTES ABSOLUTE: 2.1 K/UL (ref 1–5.1)
LYMPHOCYTES RELATIVE PERCENT: 33.8 %
MCH RBC QN AUTO: 30 PG (ref 26–34)
MCHC RBC AUTO-ENTMCNC: 33.7 G/DL (ref 31–36)
MCV RBC AUTO: 89 FL (ref 80–100)
MONOCYTES ABSOLUTE: 0.6 K/UL (ref 0–1.3)
MONOCYTES RELATIVE PERCENT: 9.7 %
NEUTROPHILS ABSOLUTE: 3.2 K/UL (ref 1.7–7.7)
NEUTROPHILS RELATIVE PERCENT: 51.3 %
PDW BLD-RTO: 17.1 % (ref 12.4–15.4)
PLATELET # BLD: 174 K/UL (ref 135–450)
PMV BLD AUTO: 7.3 FL (ref 5–10.5)
POTASSIUM SERPL-SCNC: 4.7 MMOL/L (ref 3.5–5.1)
RBC # BLD: 4.33 M/UL (ref 4.2–5.9)
SODIUM BLD-SCNC: 139 MMOL/L (ref 136–145)
TOTAL PROTEIN: 7.1 G/DL (ref 6.4–8.2)
TRIGL SERPL-MCNC: 156 MG/DL (ref 0–150)
VLDLC SERPL CALC-MCNC: 31 MG/DL
WBC # BLD: 6.2 K/UL (ref 4–11)

## 2019-03-27 PROCEDURE — 99214 OFFICE O/P EST MOD 30 MIN: CPT | Performed by: INTERNAL MEDICINE

## 2019-03-27 RX ORDER — RANITIDINE 150 MG/1
150 TABLET ORAL 2 TIMES DAILY
Qty: 180 TABLET | Refills: 3 | Status: SHIPPED | OUTPATIENT
Start: 2019-03-27 | End: 2019-06-26 | Stop reason: SDUPTHER

## 2019-03-27 RX ORDER — ATORVASTATIN CALCIUM 40 MG/1
TABLET, FILM COATED ORAL
Qty: 90 TABLET | Refills: 3 | Status: SHIPPED | OUTPATIENT
Start: 2019-03-27 | End: 2019-06-26 | Stop reason: SDUPTHER

## 2019-03-27 RX ORDER — FAMOTIDINE 20 MG/1
TABLET, FILM COATED ORAL
Qty: 180 TABLET | Refills: 3 | Status: CANCELLED | OUTPATIENT
Start: 2019-03-27

## 2019-03-27 RX ORDER — CARVEDILOL 25 MG/1
25 TABLET ORAL 2 TIMES DAILY
Qty: 180 TABLET | Refills: 3 | Status: SHIPPED | OUTPATIENT
Start: 2019-03-27 | End: 2019-06-26 | Stop reason: SDUPTHER

## 2019-03-27 ASSESSMENT — PATIENT HEALTH QUESTIONNAIRE - PHQ9: SUM OF ALL RESPONSES TO PHQ QUESTIONS 1-9: 1

## 2019-03-28 LAB
CREATININE URINE: 134.7 MG/DL (ref 39–259)
ESTIMATED AVERAGE GLUCOSE: 174.3 MG/DL
HBA1C MFR BLD: 7.7 %
MICROALBUMIN UR-MCNC: <1.2 MG/DL
MICROALBUMIN/CREAT UR-RTO: NORMAL MG/G (ref 0–30)

## 2019-05-07 ENCOUNTER — OFFICE VISIT (OUTPATIENT)
Dept: CARDIOLOGY CLINIC | Age: 79
End: 2019-05-07
Payer: COMMERCIAL

## 2019-05-07 VITALS
DIASTOLIC BLOOD PRESSURE: 78 MMHG | HEART RATE: 80 BPM | BODY MASS INDEX: 33.64 KG/M2 | WEIGHT: 235 LBS | HEIGHT: 70 IN | SYSTOLIC BLOOD PRESSURE: 136 MMHG

## 2019-05-07 DIAGNOSIS — Z95.2 S/P AVR (AORTIC VALVE REPLACEMENT): ICD-10-CM

## 2019-05-07 DIAGNOSIS — E78.00 HYPERCHOLESTEREMIA: ICD-10-CM

## 2019-05-07 DIAGNOSIS — M48.02 FORAMINAL STENOSIS OF CERVICAL REGION: ICD-10-CM

## 2019-05-07 DIAGNOSIS — I83.893 VARICOSE VEINS OF BOTH LEGS WITH EDEMA: ICD-10-CM

## 2019-05-07 DIAGNOSIS — I10 ESSENTIAL HYPERTENSION: ICD-10-CM

## 2019-05-07 DIAGNOSIS — Z95.1 S/P CABG X 3: ICD-10-CM

## 2019-05-07 DIAGNOSIS — E11.40 TYPE 2 DIABETES, CONTROLLED, WITH NEUROPATHY (HCC): ICD-10-CM

## 2019-05-07 DIAGNOSIS — I25.10 CORONARY ARTERY DISEASE INVOLVING NATIVE CORONARY ARTERY OF NATIVE HEART WITHOUT ANGINA PECTORIS: Primary | ICD-10-CM

## 2019-05-07 DIAGNOSIS — I38 VHD (VALVULAR HEART DISEASE): ICD-10-CM

## 2019-05-07 PROCEDURE — 99214 OFFICE O/P EST MOD 30 MIN: CPT | Performed by: INTERNAL MEDICINE

## 2019-05-07 NOTE — PROGRESS NOTES
OFFICE PROGRESS NOTES      Armani Hinkle is a 77 y.o. male who has    CHIEF COMPLAINT AS FOLLOWS:  CHEST PAIN: Patient denies any C/O chest pains at this time. SOB: No C/O SOB at this time. LEG EDEMA: B/L Lower extremity edema is present but no change over previous. PALPITATIONS: Denies any C/O Palpitations                              DIZZINESS: No C/O Dizziness                          SYNCOPE: None   OTHER:                                     HPI: Patient is here for F/U on his CAD, VHD & Dyslipidemia problems. He does not have any new complaints at this time.     Key Moon has the following history recorded in care path:  Patient Active Problem List    Diagnosis Date Noted    Varicose veins of both legs with edema 09/26/2018    [facility]) [facility]     S/P CABG x 3 08/03/2016    S/P AVR (aortic valve replacement) 08/03/2016    Anemia 08/03/2016    CAD (coronary artery disease)     S/P cardiac cath 06/29/2016    Coronary artery disease involving native coronary artery of native heart without angina pectoris     VHD (valvular heart disease) 05/19/2016    Hypercholesteremia 01/20/2016    Slow transit constipation 09/18/2015    Foraminal stenosis of cervical region 09/18/2015    Type 2 diabetes, controlled, with neuropathy ([facility] [facility] 09/18/2015    Lumbar radiculopathy 11/18/2014    Benign prostatic hyperplasia 11/18/2014    PUD (peptic ulcer disease)     HTN (hypertension)     Fuchs' corneal dystrophy      Current Outpatient Medications   Medication Sig Dispense Refill    carvedilol (COREG) 25 MG tablet Take 1 tablet by mouth 2 times daily 180 tablet 3    atorvastatin (LIPITOR) 40 MG tablet TAKE ONE TABLET BY MOUTH DAILY 90 tablet 3    ranitidine (ZANTAC) 150 MG tablet Take 1 tablet by mouth 2 times daily 180 tablet 3    furosemide (LASIX) 40 MG tablet Take 1 tablet by mouth daily 90 tablet 3    diltiazem (CARDIZEM CD) 120 MG extended release capsule Take 1 capsule by mouth daily 90 capsule 3    Coenzyme Q10 (COQ10) 400 MG CAPS Take by mouth every morning Over The Counter      Magnesium 500 MG CAPS Take by mouth every evening Over The Counter      aspirin 81 MG EC tablet Take 81 mg by mouth nightly Over The Counter      albuterol sulfate  (90 BASE) MCG/ACT inhaler Inhale 2 puffs into the lungs every 6 hours as needed for Wheezing 1 Inhaler 3     No current facility-administered medications for this visit. Allergies: Betadine [povidone iodine]  Past Medical History:   Diagnosis Date    Acid reflux     Aortic stenosis 1/2015, 4/2014 OTIS    1/2015 Echo EF 55% Mod AS,   4/14 TTE mod-severe AS, EF 55%; 4/13 TTE mod aortic stenosis, mild MR and TR, EF >55%    Aortic valve stenosis     Arthritis     Hands, Back     CAD (coronary artery disease)     Sees Dr. Matt Estrella CHF (congestive heart failure) ([facility])     3/7 TTE EF 55-60%, mild AS; 8/5 stress large inferoseptal MI, no ischemia, EF 30%    Cholelithiasis     COPD [facility] obstructive [facility] disease) [facility]     Diabetes type 2, controlled ([facility] [address] Dx 2000    Enlarged prostate     Fuchs' corneal dystrophy Dx 2000    Bilateral Eyes    Full dentures     H/O cardiovascular stress test 1/22/2015    cardiolite-normal, no ischemia, EF58%    H/O echocardiogram 1/22/15, 4/17/14, 4/5/13 1/2015, EF 55% Mod AS,  2014,EF 55%,Mod-Severe aortic stenosis, Trace of  Aortic Insufficiency, Mild MR & TR    [place] (hard of hearing)     Bilateral Ears    HTN (hypertension)     Hx of Doppler echocardiogram 10/15/2018    Left ventricular systolic function is normal with an ejection fraction of 50-55%. Mild concentric left ventricular hypertrophy. The left atrium is mildly dilated. Mild dilatation of the aortic root. 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 No other significant valvulopathy seen. No evidence of pericardial effusion.  Lumbar spinal stenosis      MRI small left L2-3 herniation, severe central stenosis L4-5, mod central stenosis L2-3 and L3-4    Nocturia     PUD (peptic ulcer disease)      EGD 2 1cm duodenal ulcers, 1cm linear ulcer in antrum, <1cm ulcer in pyloric opening, erosive gastritis    S/P CABG x 3 2016    3-vessel and aortic valve     Shortness of breath on exertion     SOB (shortness of breath) on exertion 2017    Urinary frequency     Wears glasses      Past Surgical History:   Procedure Laterality Date    BACK SURGERY  3/15     DR. Fang - L2-S1 laminectomy and forototomy with L4-S1 fusion and fixation    CARDIAC CATHETERIZATION  2016   Meagan Hsieh CARDIAC SURGERY  2016    Aortiv valve repair # 22 medtronic tissue valve, CABG x3 SVG to circ, SVG to PDA and LIMA to LAD    CHOLECYSTECTOMY, LAPAROSCOPIC  2/10    Dr. Hollis Agosto      All Teeth Extracted In Past    ENDOSCOPY, COLON, DIAGNOSTIC  2013   Meagan Hsieh RECTAL SURGERY  's    Benign Rectal Cyst Removed    THORACENTESIS Right 2016    Dr Karuna De La Cruz  1940's      As reviewed   Family History   Problem Relation Age of Onset    Stroke Father     Heart Disease Father         \"Aortic Valve, 4 Bypasses\"   Meagan Hsieh Alzheimer's Disease Mother     Stroke Mother     Depression Mother     Cancer Brother         Bladder Cancer    Heart Disease Brother         Heart Stents    Cancer Sister         Lung Cancer    Diabetes Son      Social History     Tobacco Use    Smoking status: Former Smoker     Packs/day: 2.00     Years: 45.00     Pack years: 90.00     Types: Cigarettes, Pipe     Start date: 1955     Last attempt to quit: 2000     Years since quittin.8    Smokeless tobacco: Never Used   Substance Use Topics    Alcohol use:  Yes     Alcohol/week: 8.4 oz     Types: 14 Standard drinks or equivalent per week Comment: \"2 Bottles Of [employer] A Day\"      Review of Systems:    Constitutional: Negative for diaphoresis and fatigue  Psychological:Negative for anxiety or depression  HENT: Negative for headaches, nasal congestion, sinus pain or vertigo  Eyes: Negative for visual disturbance. Endocrine: Negative for polydipsia/polyuria  Respiratory: Negative for shortness of breath  Cardiovascular: Negative for chest pain, dyspnea on exertion, claudication, edema, irregular heartbeat, murmur, palpitations or shortness of breath  Gastrointestinal: Negative for abdominal pain or heartburn  Genito-Urinary: Negative for urinary frequency/urgency  Musculoskeletal: Negative for muscle pain, muscular weakness, negative for pain in arm and leg or swelling in foot and leg  Neurological: Negative for dizziness, headaches, memory loss, numbness/tingling, visual changes, syncope  Dermatological: Negative for rash    Objective:  /78   Pulse 80   Ht 5' 10\" (1.778 m)   Wt 235 lb (106.6 kg)   BMI 33.72 kg/m²   Wt Readings from Last 3 Encounters:   05/07/19 235 lb (106.6 kg)   03/27/19 229 lb 3.2 oz (104 kg)   03/01/19 234 lb 6.4 oz (106.3 kg)     Body mass index is 33.72 kg/m². GENERAL - Alert, oriented, pleasant, in no apparent distress. EYES: No jaundice, no conjunctival pallor. SKIN: It is warm & dry. No rashes. No Echhymosis    HEENT - No clinically significant abnormalities seen. Neck - Supple. No jugular venous distention noted. No carotid bruits. Cardiovascular - Normal S1 and S2 without obvious murmur or gallop. Extremities - No cyanosis, clubbing, or significant edema. Pulmonary - No respiratory distress. No wheezes or rales. Abdomen - No masses, tenderness, or organomegaly. Musculoskeletal - No significant edema. No joint deformities. No muscle wasting. Neurologic - Cranial nerves II through XII are grossly intact. There were no gross focal neurologic abnormalities.     Lab Review   No results found for: angina pectoris    2. Essential hypertension    3. Foraminal stenosis of cervical region    4. Type 2 diabetes, controlled, with neuropathy ([address]    5. Hypercholesteremia    6. VHD (valvular heart disease)    7. S/P CABG x 3    8. S/P AVR (aortic valve replacement)    9. Varicose veins of both legs with edema       Patient Active Problem List   Diagnosis Code    HTN (hypertension) I10    Fuchs' corneal dystrophy H18.51    PUD (peptic ulcer disease) K27.9    Lumbar radiculopathy M54.16    Benign prostatic hyperplasia N40.0    Slow transit constipation K59.01    Foraminal stenosis of cervical region M99.81    Type 2 diabetes, controlled, with neuropathy ([address] E11.40    Hypercholesteremia E78.00    VHD (valvular heart disease) I38    Coronary artery disease involving native coronary artery of native heart without angina pectoris I25.10    S/P cardiac cath Z98.890    CAD (coronary artery disease) I25.10    S/P CABG x 3 Z95.1    S/P AVR (aortic valve replacement) Z95.2    Anemia D64.9    COPD (chronic obstructive pulmonary disease) ([facility] J44.9    Varicose veins of both legs with edema I83.893       Assessment & Plan:    CAD:Yes   clinically stable. Patient is on optimal medical regimen ( see medication list above )  -     CORONARY ARTERY DISEASE: Patient is currently  asymptomatic from CAD. - changes in  treatment:   no           - Testing ordered:  no  [facility] classification: 1  [place] RISK SCORE:  [place] RISK SCORE:  HTN:well controlled on current medical regimen, see list above. - changes in  treatment:   no   CARDIOMYOPATHY: None known   CONGESTIVE HEART FAILURE: NO KNOWN HISTORY.      VHD: S/P AVR  DYSLIPIDEMIA: Patient's profile is at / near [employer],                                                             Tolerating current medical regimen wellyes,                                                               See most recent Lab values in Labs section above. Diabetes mellitis: .yes,                                      Managed by family MD                                     BS under good control no                                     Hgb A1c avilable yes,   OTHER RELEVANT DIAGNOSIS:as noted in patient's active problem list:  CVI: Deep vein problem. Patient to wear compression stockings. TESTS ORDERED: None this visit                                              All previously ordered tests reviewed. ARRHYTHMIAS: None known   MEDICATIONS: CPM   Office f/u in six months. Primary/secondary prevention is the goal by aggressive risk modification, healthy and therapeutic life style changes for cardiovascular risk reduction. Various goals are discussed and multiple questions answered.

## 2019-05-07 NOTE — LETTER
[address]  [address] [facility] [address]  Phone: [phone]  Fax: [phone]    Dalia Thompson MD        May 7, 2019     Sky Connolly MD  [address] [place] [address]    Patient: Carol Lynn  MR Number: [id number]  YOB: 1940  Date of Visit: 5/7/2019    Dear Dr. Sky Connolly:    Thank you for the request for consultation for Gricel Lan to me for the evaluation of CAD. Below are the relevant portions of my assessment and plan of care. If you have questions, please do not hesitate to call me. I look forward to following Cooper Perez along with you.     Sincerely,        Dalia Thompson MD

## 2019-06-11 DIAGNOSIS — I10 ESSENTIAL HYPERTENSION: ICD-10-CM

## 2019-06-11 RX ORDER — DILTIAZEM HYDROCHLORIDE 120 MG/1
120 CAPSULE, COATED, EXTENDED RELEASE ORAL DAILY
Qty: 90 CAPSULE | Refills: 3 | Status: SHIPPED | OUTPATIENT
Start: 2019-06-11 | End: 2019-06-26 | Stop reason: SDUPTHER

## 2019-06-26 ENCOUNTER — OFFICE VISIT (OUTPATIENT)
Dept: INTERNAL MEDICINE CLINIC | Age: 79
End: 2019-06-26
Payer: COMMERCIAL

## 2019-06-26 ENCOUNTER — CARE COORDINATION (OUTPATIENT)
Dept: CARE COORDINATION | Age: 79
End: 2019-06-26

## 2019-06-26 VITALS
HEART RATE: 79 BPM | WEIGHT: 235 LBS | OXYGEN SATURATION: 91 % | SYSTOLIC BLOOD PRESSURE: 126 MMHG | DIASTOLIC BLOOD PRESSURE: 74 MMHG | BODY MASS INDEX: 33.72 KG/M2 | RESPIRATION RATE: 18 BRPM

## 2019-06-26 DIAGNOSIS — I10 ESSENTIAL HYPERTENSION: ICD-10-CM

## 2019-06-26 DIAGNOSIS — J41.0 SIMPLE CHRONIC BRONCHITIS (HCC): ICD-10-CM

## 2019-06-26 DIAGNOSIS — I25.810 CORONARY ARTERY DISEASE INVOLVING CORONARY BYPASS GRAFT OF NATIVE HEART WITHOUT ANGINA PECTORIS: ICD-10-CM

## 2019-06-26 DIAGNOSIS — R41.3 MEMORY LOSS: ICD-10-CM

## 2019-06-26 DIAGNOSIS — E11.40 TYPE 2 DIABETES, CONTROLLED, WITH NEUROPATHY (HCC): Primary | ICD-10-CM

## 2019-06-26 LAB — HBA1C MFR BLD: 7.6 %

## 2019-06-26 PROCEDURE — 99214 OFFICE O/P EST MOD 30 MIN: CPT | Performed by: INTERNAL MEDICINE

## 2019-06-26 PROCEDURE — 83036 HEMOGLOBIN GLYCOSYLATED A1C: CPT | Performed by: INTERNAL MEDICINE

## 2019-06-26 RX ORDER — CARVEDILOL 25 MG/1
25 TABLET ORAL 2 TIMES DAILY
Qty: 180 TABLET | Refills: 3 | Status: SHIPPED | OUTPATIENT
Start: 2019-06-26 | End: 2019-10-10 | Stop reason: SDUPTHER

## 2019-06-26 RX ORDER — FUROSEMIDE 40 MG/1
40 TABLET ORAL DAILY
Qty: 90 TABLET | Refills: 3 | Status: SHIPPED | OUTPATIENT
Start: 2019-06-26 | End: 2020-01-30 | Stop reason: SDUPTHER

## 2019-06-26 RX ORDER — FLUTICASONE PROPIONATE 50 MCG
2 SPRAY, SUSPENSION (ML) NASAL DAILY
Qty: 1 BOTTLE | Refills: 5 | COMMUNITY
Start: 2019-06-26 | End: 2020-01-30 | Stop reason: SDUPTHER

## 2019-06-26 RX ORDER — RANITIDINE 150 MG/1
150 TABLET ORAL 2 TIMES DAILY
Qty: 180 TABLET | Refills: 3 | COMMUNITY
Start: 2019-06-26 | End: 2019-09-20

## 2019-06-26 RX ORDER — ATORVASTATIN CALCIUM 40 MG/1
TABLET, FILM COATED ORAL
Qty: 90 TABLET | Refills: 3 | Status: SHIPPED | OUTPATIENT
Start: 2019-06-26 | End: 2020-01-30 | Stop reason: SDUPTHER

## 2019-06-26 RX ORDER — ALBUTEROL SULFATE 90 UG/1
2 AEROSOL, METERED RESPIRATORY (INHALATION) EVERY 6 HOURS PRN
Qty: 1 INHALER | Refills: 3 | Status: SHIPPED | OUTPATIENT
Start: 2019-06-26 | End: 2019-08-15 | Stop reason: SDUPTHER

## 2019-06-26 RX ORDER — DILTIAZEM HYDROCHLORIDE 120 MG/1
120 CAPSULE, COATED, EXTENDED RELEASE ORAL DAILY
Qty: 90 CAPSULE | Refills: 3 | Status: SHIPPED | OUTPATIENT
Start: 2019-06-26 | End: 2020-01-30 | Stop reason: SDUPTHER

## 2019-06-26 RX ORDER — MEMANTINE HYDROCHLORIDE 5 MG/1
5 TABLET ORAL 2 TIMES DAILY
Qty: 60 TABLET | Refills: 5 | Status: SHIPPED | OUTPATIENT
Start: 2019-06-26 | End: 2019-12-18 | Stop reason: SDUPTHER

## 2019-06-26 NOTE — PROGRESS NOTES
Janay Lala  1940 06/26/19    SUBJECTIVE:    Pt continues to have difficulties with his memory. Patient on no medications for diabetes. Patient has had no episodes of significant hypoglycemia. The patient is taking hypertensive medications compliantly without side effects. Denies chest pain, dyspnea, edema, or TIA's. Patient denies any chest pain, shortness of breath, myalgias, Patient is tolerating cholesterol medications without difficulty. Pt with persistent nasal congestion despite the zyrtec. OBJECTIVE:    /74   Pulse 79   Resp 18   Wt 235 lb (106.6 kg)   SpO2 91%   BMI 33.72 kg/m²     Physical Exam   Constitutional: He is oriented to person, place, and time. He appears well-developed and well-nourished. Eyes: Conjunctivae are normal. No scleral icterus. Neck: Neck supple. No JVD present. No tracheal deviation present. No thyromegaly present. Cardiovascular: Normal rate, regular rhythm and intact distal pulses. Murmur heard. Systolic murmur is present with a grade of 2/6. Pulses:       Dorsalis pedis pulses are 2+ on the right side, and 2+ on the left side. Posterior tibial pulses are 2+ on the right side, and 2+ on the left side. Pulmonary/Chest: Effort normal and breath sounds normal. No respiratory distress. Abdominal: Soft. He exhibits no distension and no mass. There is no hepatosplenomegaly. There is no tenderness. There is no rebound, no guarding and no CVA tenderness. Musculoskeletal: He exhibits edema (1+ R>L to distal leg). Lymphadenopathy:     He has no cervical adenopathy. Neurological: He is alert and oriented to person, place, and time. A sensory deficit (to monofilament right entire foot, left toes) is present. Nonfocal   Skin: Skin is warm, dry and intact. No cyanosis. Nails show no clubbing. No foot ulcerations   Psychiatric: He has a normal mood and affect.  His behavior is normal. Judgment normal. ASSESSMENT:    1. Type 2 diabetes, controlled, with neuropathy ([facility]    2. Essential hypertension    3. Coronary artery disease involving coronary bypass graft of native heart without angina pectoris    4. Simple chronic bronchitis ([facility]    5. Memory loss        PLAN:    Marguerite Desai was seen today for medication refill. Diagnoses and all orders for this visit:    Type 2 diabetes, controlled, with neuropathy ([facility] [address] - a1c 7. 6' no change in mgmt; foot exam shows some neuropathy - need to watch feet  -      DIABETES FOOT EXAM  -     POCT glycosylated hemoglobin (Hb A1C)  -     Comprehensive Metabolic Panel; Future  -     Lipid Panel; Future  -     Hemoglobin A1C; Future  -     CBC Auto Differential; Future    Essential hypertension - at goal; check labs  -     diltiazem (CARDIZEM CD) 120 MG extended release capsule; Take 1 capsule by mouth daily  -     carvedilol (COREG) 25 MG tablet; Take 1 tablet by mouth 2 times daily  -     Comprehensive Metabolic Panel; Future  -     Lipid Panel; Future  -     Hemoglobin A1C; Future  -     CBC Auto Differential; Future    Coronary artery disease involving coronary bypass graft of native heart without angina pectoris - no current symptoms, no change  -     carvedilol (COREG) 25 MG tablet; Take 1 tablet by mouth 2 times daily  -     atorvastatin (LIPITOR) 40 MG tablet; TAKE ONE TABLET BY MOUTH DAILY  -     Comprehensive Metabolic Panel; Future  -     Lipid Panel; Future  -     Hemoglobin A1C; Future  -     CBC Auto Differential; Future    Simple chronic bronchitis (HCC)  -     albuterol sulfate  (90 Base) MCG/ACT inhaler; Inhale 2 puffs into the lungs every 6 hours as needed for Wheezing    Memory loss - will try namenda 5mg once daily for one week then BID. Side effects discussed. F/u in one month  -     memantine (NAMENDA) 5 MG tablet; Take 1 tablet by mouth 2 times daily    Other orders  -     furosemide (LASIX) 40 MG tablet;  Take 1 tablet by mouth daily  - ranitidine (ZANTAC) 150 MG tablet;  Take 1 tablet by mouth 2 times daily  -     fluticasone (FLONASE) 50 MCG/ACT nasal spray; 2 sprays by Each Nostril route daily

## 2019-06-28 NOTE — CARE COORDINATION
every 6 hours as needed for Wheezing 6/26/19 6/25/20  Aliyah Noriega MD   fluticasone Ulanda Christos) 50 MCG/ACT nasal spray 2 sprays by Each Nostril route daily 6/26/19   Aliyah Noriega MD   [facility]) 5 MG tablet Take 1 tablet by mouth 2 times daily 6/26/19   Aliyah Noriega MD   Coenzyme Q10 (COQ10) 400 MG CAPS Take by mouth every morning Over The Counter    Historical Provider, MD   Magnesium 500 MG CAPS Take by mouth every evening Over The Counter    Historical Provider, MD   aspirin 81 MG EC tablet Take 81 mg by mouth nightly Over The Counter    Historical Provider, MD       Future Appointments   Date Time Provider Mian Johnson   9/20/2019  8:30 AM Aliyah Noriega MD [facility]   11/14/2019  2:30 PM Palomo Booth MD [facility]     ,   Diabetes Assessment    Medic Alert ID:  No  Meal Planning:  Avoidance of concentrated sweets   How often do you test your blood sugar?:  No Testing   Have you ever had to go to the ED for symptoms of low blood sugar?:  No           and   COPD Assessment    Does the patient understand envrionmental exposure?:  Yes  Is the patient able to verbalize Rescue vs. Long Acting medications?:  No  Does the patient have a nebulizer?:  No  Does the patient use a space with inhaled medications?:  No            Symptoms:

## 2019-07-10 ENCOUNTER — OFFICE VISIT (OUTPATIENT)
Dept: INTERNAL MEDICINE CLINIC | Age: 79
End: 2019-07-10
Payer: COMMERCIAL

## 2019-07-10 VITALS
RESPIRATION RATE: 16 BRPM | WEIGHT: 231.6 LBS | OXYGEN SATURATION: 94 % | HEART RATE: 74 BPM | SYSTOLIC BLOOD PRESSURE: 104 MMHG | DIASTOLIC BLOOD PRESSURE: 50 MMHG | BODY MASS INDEX: 33.23 KG/M2

## 2019-07-10 DIAGNOSIS — R19.7 DIARRHEA OF PRESUMED INFECTIOUS ORIGIN: Primary | ICD-10-CM

## 2019-07-10 PROCEDURE — 99213 OFFICE O/P EST LOW 20 MIN: CPT | Performed by: INTERNAL MEDICINE

## 2019-08-15 ENCOUNTER — OFFICE VISIT (OUTPATIENT)
Dept: INTERNAL MEDICINE CLINIC | Age: 79
End: 2019-08-15
Payer: COMMERCIAL

## 2019-08-15 VITALS
SYSTOLIC BLOOD PRESSURE: 106 MMHG | OXYGEN SATURATION: 93 % | RESPIRATION RATE: 18 BRPM | DIASTOLIC BLOOD PRESSURE: 74 MMHG | HEART RATE: 76 BPM | WEIGHT: 235 LBS | BODY MASS INDEX: 33.72 KG/M2

## 2019-08-15 DIAGNOSIS — J41.0 SIMPLE CHRONIC BRONCHITIS (HCC): ICD-10-CM

## 2019-08-15 DIAGNOSIS — L97.521 SKIN ULCER OF TOE OF LEFT FOOT, LIMITED TO BREAKDOWN OF SKIN (HCC): Primary | ICD-10-CM

## 2019-08-15 PROCEDURE — 99213 OFFICE O/P EST LOW 20 MIN: CPT | Performed by: INTERNAL MEDICINE

## 2019-08-15 RX ORDER — ALBUTEROL SULFATE 90 UG/1
2 AEROSOL, METERED RESPIRATORY (INHALATION) EVERY 6 HOURS PRN
Qty: 1 INHALER | Refills: 3 | Status: SHIPPED | OUTPATIENT
Start: 2019-08-15 | End: 2020-01-30

## 2019-08-15 RX ORDER — BENZONATATE 100 MG/1
100 CAPSULE ORAL 3 TIMES DAILY PRN
Qty: 30 CAPSULE | Refills: 0 | Status: SHIPPED | OUTPATIENT
Start: 2019-08-15 | End: 2019-08-22

## 2019-08-15 RX ORDER — DOXYCYCLINE HYCLATE 100 MG
100 TABLET ORAL 2 TIMES DAILY
Qty: 20 TABLET | Refills: 0 | Status: SHIPPED | OUTPATIENT
Start: 2019-08-15 | End: 2019-08-25

## 2019-08-15 RX ORDER — PREDNISONE 10 MG/1
TABLET ORAL
Qty: 20 TABLET | Refills: 0 | Status: SHIPPED | OUTPATIENT
Start: 2019-08-15 | End: 2019-08-30 | Stop reason: ALTCHOICE

## 2019-08-16 ENCOUNTER — HOSPITAL ENCOUNTER (OUTPATIENT)
Age: 79
Discharge: HOME | End: 2019-08-16
Payer: COMMERCIAL

## 2019-08-16 ENCOUNTER — HOSPITAL ENCOUNTER (OUTPATIENT)
Dept: GENERAL RADIOLOGY | Age: 79
Discharge: HOME | End: 2019-08-16
Payer: COMMERCIAL

## 2019-08-16 DIAGNOSIS — J41.0 SIMPLE CHRONIC BRONCHITIS (HCC): ICD-10-CM

## 2019-08-16 PROCEDURE — 71046 X-RAY EXAM CHEST 2 VIEWS: CPT

## 2019-08-22 ENCOUNTER — OFFICE VISIT (OUTPATIENT)
Dept: INTERNAL MEDICINE CLINIC | Age: 79
End: 2019-08-22
Payer: COMMERCIAL

## 2019-08-22 VITALS
DIASTOLIC BLOOD PRESSURE: 76 MMHG | HEART RATE: 72 BPM | OXYGEN SATURATION: 90 % | RESPIRATION RATE: 18 BRPM | SYSTOLIC BLOOD PRESSURE: 130 MMHG | BODY MASS INDEX: 33.43 KG/M2 | WEIGHT: 233 LBS

## 2019-08-22 DIAGNOSIS — J40 BRONCHITIS: Primary | ICD-10-CM

## 2019-08-22 PROCEDURE — 99213 OFFICE O/P EST LOW 20 MIN: CPT | Performed by: INTERNAL MEDICINE

## 2019-08-28 ENCOUNTER — TELEPHONE (OUTPATIENT)
Dept: INTERNAL MEDICINE CLINIC | Age: 79
End: 2019-08-28

## 2019-08-28 NOTE — TELEPHONE ENCOUNTER
The patients wife called in stating that the patient have taken the antibiotic and the blister on the toe is now seeping and they were wanting to know if it is possible for the patient to get another round of antibiotics or if the patient should come back in to be seen.

## 2019-08-29 ENCOUNTER — OFFICE VISIT (OUTPATIENT)
Dept: INTERNAL MEDICINE CLINIC | Age: 79
End: 2019-08-29
Payer: COMMERCIAL

## 2019-08-29 ENCOUNTER — CARE COORDINATION (OUTPATIENT)
Dept: CARE COORDINATION | Age: 79
End: 2019-08-29

## 2019-08-29 VITALS
SYSTOLIC BLOOD PRESSURE: 110 MMHG | OXYGEN SATURATION: 93 % | DIASTOLIC BLOOD PRESSURE: 66 MMHG | RESPIRATION RATE: 18 BRPM | HEART RATE: 74 BPM

## 2019-08-29 DIAGNOSIS — L97.512 DIABETIC ULCER OF TOE OF RIGHT FOOT ASSOCIATED WITH TYPE 2 DIABETES MELLITUS, WITH FAT LAYER EXPOSED (HCC): Primary | ICD-10-CM

## 2019-08-29 DIAGNOSIS — E11.621 DIABETIC ULCER OF TOE OF RIGHT FOOT ASSOCIATED WITH TYPE 2 DIABETES MELLITUS, WITH FAT LAYER EXPOSED (HCC): Primary | ICD-10-CM

## 2019-08-29 PROCEDURE — 99213 OFFICE O/P EST LOW 20 MIN: CPT | Performed by: INTERNAL MEDICINE

## 2019-08-29 RX ORDER — AMOXICILLIN AND CLAVULANATE POTASSIUM 875; 125 MG/1; MG/1
1 TABLET, FILM COATED ORAL 2 TIMES DAILY
Qty: 28 TABLET | Refills: 0 | Status: SHIPPED | OUTPATIENT
Start: 2019-08-29 | End: 2019-09-12

## 2019-08-30 ENCOUNTER — HOSPITAL ENCOUNTER (OUTPATIENT)
Dept: WOUND CARE | Age: 79
Discharge: HOME | End: 2019-08-30
Payer: COMMERCIAL

## 2019-08-30 VITALS
RESPIRATION RATE: 16 BRPM | TEMPERATURE: 98.1 F | BODY MASS INDEX: 33.36 KG/M2 | SYSTOLIC BLOOD PRESSURE: 138 MMHG | HEIGHT: 70 IN | DIASTOLIC BLOOD PRESSURE: 79 MMHG | WEIGHT: 233 LBS | HEART RATE: 80 BPM

## 2019-08-30 DIAGNOSIS — L97.512 DIABETIC ULCER OF TOE OF RIGHT FOOT ASSOCIATED WITH TYPE 2 DIABETES MELLITUS, WITH FAT LAYER EXPOSED (HCC): Primary | ICD-10-CM

## 2019-08-30 DIAGNOSIS — E11.621 DIABETIC ULCER OF TOE OF RIGHT FOOT ASSOCIATED WITH TYPE 2 DIABETES MELLITUS, WITH FAT LAYER EXPOSED (HCC): Primary | ICD-10-CM

## 2019-08-30 DIAGNOSIS — L97.512 PLANTAR ULCER, RIGHT, WITH FAT LAYER EXPOSED (HCC): ICD-10-CM

## 2019-08-30 PROCEDURE — 11042 DBRDMT SUBQ TIS 1ST 20SQCM/<: CPT

## 2019-08-30 PROCEDURE — 87073 CULTURE BACTERIA ANAEROBIC: CPT

## 2019-08-30 PROCEDURE — 99213 OFFICE O/P EST LOW 20 MIN: CPT

## 2019-08-30 PROCEDURE — 87071 CULTURE AEROBIC QUANT OTHER: CPT

## 2019-08-30 NOTE — H&P
[address] [place] [address], [address] [place] [address]                              HISTORY AND PHYSICAL    PATIENT NAME: Lake Jacobs                 :        1940  MED REC NO:   [id number]                          ROOM:  ACCOUNT NO:   [de-identified]                           ADMIT DATE: 2019  PROVIDER:     Manoj Guaman MD    HISTORY AND PHYSICAL:  The patient is a 76-year-old male, referred by  Dr. Araseli Rendon for evaluation and management of an ulceration of the  plantar aspect of the right great toe. The patient is accompanied by his wife today who provides much of the  history. He apparently developed a blistered area on his toe a month  ago. She debrided some of the \"hanging skin\" and he has had a  persistent ulcer since that time. He has been seen by his primary care physician, Dr. Gracie Nicole, was  given an antibiotic for an upper respiratory problems that did not help  his toe. He was recently seen in Dr. Araseli Rendon' office and started on  Augmentin and referred to the [address] [facility] for evaluation and management  of the wound. HABITS:  The patient is a former smoker who quit in  after smoking  two packs per day for 45 years. He does consume alcohol on a regular  basis daily. ALLERGIES:  Stated allergies are to BETADINE, which causes itching and a  rash. PAST MEDICAL HISTORY:  Includes a history of hypertension, type 2  diabetes, hypercholesterolemia, valvular heart disease, coronary artery  disease, and chronic obstructive pulmonary disease. PAST SURGICAL HISTORY:  Included an aortic valve replacement, coronary  artery bypass surgery, remote history of tonsillectomy and  adenoidectomy, rectal surgery, cholecystectomy, and back surgery. CURRENT MEDICATIONS:  Include Augmentin, Lasix, Cardizem, Coreg,  Lipitor, Zantac, Flonase, Namenda, coenzyme Q10, magnesium, and  albuterol.   As stated, he is under the

## 2019-09-03 LAB
CULTURE: ABNORMAL
CULTURE: ABNORMAL
Lab: ABNORMAL
SPECIMEN: ABNORMAL

## 2019-09-06 ENCOUNTER — HOSPITAL ENCOUNTER (OUTPATIENT)
Dept: WOUND CARE | Age: 79
Discharge: HOME | End: 2019-09-06
Payer: COMMERCIAL

## 2019-09-06 VITALS
HEART RATE: 74 BPM | SYSTOLIC BLOOD PRESSURE: 152 MMHG | RESPIRATION RATE: 16 BRPM | TEMPERATURE: 98.6 F | DIASTOLIC BLOOD PRESSURE: 76 MMHG

## 2019-09-06 DIAGNOSIS — E11.621 DIABETIC ULCER OF TOE OF RIGHT FOOT ASSOCIATED WITH TYPE 2 DIABETES MELLITUS, WITH FAT LAYER EXPOSED (HCC): Primary | ICD-10-CM

## 2019-09-06 DIAGNOSIS — L97.512 DIABETIC ULCER OF TOE OF RIGHT FOOT ASSOCIATED WITH TYPE 2 DIABETES MELLITUS, WITH FAT LAYER EXPOSED (HCC): Primary | ICD-10-CM

## 2019-09-06 PROCEDURE — 97597 DBRDMT OPN WND 1ST 20 CM/<: CPT

## 2019-09-06 NOTE — PROGRESS NOTES
file prior to encounter. REVIEW OF SYSTEMS    Pertinent items are noted in HPI. Constitutional: Negative for systemic symptoms including fever, chills and malaise. Objective:      BP (!) 152/76   Pulse 74   Temp 98.6 °F (37 °C) (Temporal)   Resp 16     PHYSICAL EXAM      General: The patient is in no acute distress. Mental status:  Patient is appropriate, is  oriented to place and plan of care. Dermatologic exam: Visual inspection of the periwound reveals the skin to be dry  Wound exam: see wound description below in procedure note      Assessment:     Problem List Items Addressed This Visit     Diabetic ulcer of toe of right foot associated with type 2 diabetes mellitus, with fat layer exposed ([address] - Primary        Procedure Note    Indications:  Based on my examination of this patient's wound(s) today, sharp excision into necrotic epidermis and dermis is required to promote healing and evaluate the extent of previous healing. Performed by: Lolita Bradley MD    Consent obtained: Yes    Time out taken:  Yes    Pain Control: Anesthetic  Anesthetic: 4% Lidocaine Liquid Topical(2.5cc)     Debridement:Non-excisional Debridement    Using curette the wound(s) was/were sharply debrided down through and including the removal of epidermis and dermis.         Devitalized Tissue Debrided:  callus    Pre Debridement Measurements:  Are located in the Wound Documentation Flow Sheet    All active wounds listed below with today's date are evaluated  Wound(s)    debrided this date include # : 1     Post  Debridement Measurements:  Incision 07/12/16 Chest Mid (Active)   Number of days: 1151       Incision 07/12/16 Leg (Active)   Number of days: 1636       Wound 08/30/19 Toe (Comment  which one) Right;Plantar #1 GR Diabetic Garcia 2 (Active)   Wound Image   8/30/2019  9:02 AM   Wound Diabetic Garcia 2 9/6/2019  3:07 PM   Offloading for Diabetic Foot Ulcers Forefoot offloading shoe 8/30/2019  9:35 AM wounds and reposition every 2 hours. Avoid standing for long periods of time. Apply wraps/stockings in AM and remove at bedtime. If swelling is present, elevate legs to the level of the heart or above for 30                              minutes 4-5 times a day and/or when sitting. When taking antibiotics take entire prescription as ordered by physician                             do not stop taking until medicine is all gone.                                                       Orders for this week:    8-30-19                  Front Offloading shoe--8-30-19     Right Plantar Great Toe--- Clean with soap and water, pat dry. Apply Muperocin (bactroban) to wound bed and cover with Fibracol to wound bed, cover with fluff 2x2, Wrap with Conform and secure with tape. ----Change Daily     Follow up with Dr Rilla Severs  In 1 weeks in the wound care center  Call [phone] for any questions or concerns.   Date__________   Time____________             Treatment Note      Written Patient Dismissal Instructions Given            Electronically signed by Rajwinder Champagne MD on 9/6/2019 at 3:55 PM

## 2019-09-11 ENCOUNTER — OFFICE VISIT (OUTPATIENT)
Dept: CARDIOLOGY CLINIC | Age: 79
End: 2019-09-11
Payer: COMMERCIAL

## 2019-09-11 VITALS
SYSTOLIC BLOOD PRESSURE: 138 MMHG | BODY MASS INDEX: 32.99 KG/M2 | HEIGHT: 70 IN | HEART RATE: 76 BPM | DIASTOLIC BLOOD PRESSURE: 78 MMHG | WEIGHT: 230.4 LBS

## 2019-09-11 DIAGNOSIS — Z95.1 S/P CABG X 3: ICD-10-CM

## 2019-09-11 DIAGNOSIS — E11.40 TYPE 2 DIABETES, CONTROLLED, WITH NEUROPATHY (HCC): ICD-10-CM

## 2019-09-11 DIAGNOSIS — E11.621 DIABETIC ULCER OF TOE OF RIGHT FOOT ASSOCIATED WITH TYPE 2 DIABETES MELLITUS, WITH FAT LAYER EXPOSED (HCC): ICD-10-CM

## 2019-09-11 DIAGNOSIS — I25.10 CORONARY ARTERY DISEASE INVOLVING NATIVE CORONARY ARTERY OF NATIVE HEART WITHOUT ANGINA PECTORIS: Primary | ICD-10-CM

## 2019-09-11 DIAGNOSIS — Z95.2 S/P AVR (AORTIC VALVE REPLACEMENT): ICD-10-CM

## 2019-09-11 DIAGNOSIS — Z98.890 S/P CARDIAC CATH: ICD-10-CM

## 2019-09-11 DIAGNOSIS — E78.00 HYPERCHOLESTEREMIA: ICD-10-CM

## 2019-09-11 DIAGNOSIS — I83.893 VARICOSE VEINS OF BOTH LEGS WITH EDEMA: ICD-10-CM

## 2019-09-11 DIAGNOSIS — I38 VHD (VALVULAR HEART DISEASE): ICD-10-CM

## 2019-09-11 DIAGNOSIS — L97.512 DIABETIC ULCER OF TOE OF RIGHT FOOT ASSOCIATED WITH TYPE 2 DIABETES MELLITUS, WITH FAT LAYER EXPOSED (HCC): ICD-10-CM

## 2019-09-11 DIAGNOSIS — I10 ESSENTIAL HYPERTENSION: ICD-10-CM

## 2019-09-11 PROCEDURE — 99214 OFFICE O/P EST MOD 30 MIN: CPT | Performed by: INTERNAL MEDICINE

## 2019-09-11 NOTE — PROGRESS NOTES
echocardiogram 1/22/15, 4/17/14, 4/5/13 1/2015, EF 55% Mod AS,  2014,EF 55%,Mod-Severe aortic stenosis, Trace of  Aortic Insufficiency, Mild MR & TR    [place] (hard of hearing)     Bilateral Ears    HTN (hypertension)     Hx of Doppler echocardiogram 10/15/2018    Left ventricular systolic function is normal with an ejection fraction of 50-55%. Mild concentric left ventricular hypertrophy. The left atrium is mildly dilated. Mild dilatation of the aortic root. 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 No other significant valvulopathy seen. No evidence of pericardial effusion. 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 Fang - L2-S1 laminectomy and forototomy with L4-S1 fusion and fixation    CARDIAC CATHETERIZATION  06/21/2016   Gisella James CARDIAC SURGERY  07/12/2016    Aortiv valve repair # 22 medtronic tissue valve, CABG x3 SVG to circ, SVG to PDA and LIMA to LAD    CHOLECYSTECTOMY, LAPAROSCOPIC  2/10    Dr. Elizabeth Judd      All Teeth Extracted In Past    ENDOSCOPY, COLON, DIAGNOSTIC  09/2013   Gisella James RECTAL SURGERY  1980's    Benign Rectal Cyst Removed    THORACENTESIS Right 07/14/2016    Dr Nato Hassan  1940's      As reviewed   Family History   Problem Relation Age of Onset    Stroke Father     Heart Disease Father         \"Aortic Valve, 4 Bypasses\"    Alzheimer's Disease Mother    Gisella James No clinically significant abnormalities seen. Neck - Supple. No jugular venous distention noted. No carotid bruits. Cardiovascular - Normal S1 and S2 without obvious murmur or gallop. Extremities - No cyanosis, clubbing, or significant edema. Pulmonary - No respiratory distress. No wheezes or rales. Abdomen - No masses, tenderness, or organomegaly. Musculoskeletal - No significant edema. No joint deformities. No muscle wasting. Neurologic - Cranial nerves II through XII are grossly intact. There were no gross focal neurologic abnormalities.     Lab Review   No results found for: CKTOTAL, CKMB, CKMBINDEX, TROPONINT  BNP:  No results found for: BNP  PT/INR:    Lab Results   Component Value Date    INR 1.21 07/14/2016    INR 1.14 07/13/2016     Lab Results   Component Value Date    LABA1C 7.6 06/26/2019    LABA1C 7.7 03/27/2019     Lab Results   Component Value Date    WBC 6.2 03/27/2019    WBC 12.8 (H) 11/12/2018    HCT 38.6 (L) 03/27/2019    HCT 40.6 (L) 11/12/2018    MCV 89.0 03/27/2019    MCV 91.0 11/12/2018     03/27/2019     11/12/2018     Lab Results   Component Value Date    CHOL 120 03/27/2019    CHOL 105 04/23/2018    TRIG 156 (H) 03/27/2019    TRIG 115 04/23/2018    HDL 42 03/27/2019    HDL 42 04/23/2018    LDLCALC 47 03/27/2019    LDLCALC 40 04/23/2018     Lab Results   Component Value Date    ALT 21 03/27/2019    ALT 15 11/12/2018    AST 26 03/27/2019    AST 19 11/12/2018     BMP:    Lab Results   Component Value Date     03/27/2019     11/26/2018    K 4.7 03/27/2019    K 5.0 11/26/2018    CL 99 03/27/2019    CL 97 11/26/2018    CO2 32 03/27/2019    CO2 27 11/26/2018    BUN 12 03/27/2019    BUN 13 11/26/2018    CREATININE 0.8 03/27/2019    CREATININE 0.9 11/26/2018     CMP:   Lab Results   Component Value Date     03/27/2019     11/26/2018    K 4.7 03/27/2019    K 5.0 11/26/2018    CL 99 03/27/2019    CL 97 11/26/2018    CO2 32 03/27/2019    CO2 27 W08.898    Plantar ulcer, right, with fat layer exposed ([address] L97.512       Assessment & Plan:    CAD:Yes   clinically stable. Patient is on optimal medical regimen ( see medication list above )  -     CORONARY ARTERY DISEASE: Patient is currently  asymptomatic from CAD. - changes in  treatment:   no           - Testing ordered:  no  [facility] classification: 1  [place] RISK SCORE:  [place] RISK SCORE:  HTN:well controlled on current medical regimen, see list above. - changes in  treatment:   no   CARDIOMYOPATHY: None known   CONGESTIVE HEART FAILURE: NO KNOWN HISTORY.     VHD: S/P AVR  DYSLIPIDEMIA: Patient's profile is at / near [employer],                                                          Tolerating current medical regimen wellyes,                                                               See most recent Lab values in Labs section above. Diabetes mellitis: .yes,                                      Managed by family MD                                     BS under good control no                                     Hgb A1c avilable yes,   OTHER RELEVANT DIAGNOSIS:as noted in patient's active problem list:  Foot ulcer ? PAD  TESTS ORDERED:  Peripheral LE arterial US. All previously ordered tests reviewed. ARRHYTHMIAS: None known   MEDICATIONS: [facility]   Office f/u for test results. Primary/secondary prevention is the goal by aggressive risk modification, healthy and therapeutic life style changes for cardiovascular risk reduction. Various goals are discussed and multiple questions answered.

## 2019-09-17 ENCOUNTER — PROCEDURE VISIT (OUTPATIENT)
Dept: CARDIOLOGY CLINIC | Age: 79
End: 2019-09-17
Payer: COMMERCIAL

## 2019-09-17 DIAGNOSIS — E78.00 HYPERCHOLESTEREMIA: ICD-10-CM

## 2019-09-17 DIAGNOSIS — I38 VHD (VALVULAR HEART DISEASE): ICD-10-CM

## 2019-09-17 DIAGNOSIS — L97.512 DIABETIC ULCER OF TOE OF RIGHT FOOT ASSOCIATED WITH TYPE 2 DIABETES MELLITUS, WITH FAT LAYER EXPOSED (HCC): ICD-10-CM

## 2019-09-17 DIAGNOSIS — I10 ESSENTIAL HYPERTENSION: ICD-10-CM

## 2019-09-17 DIAGNOSIS — I83.893 VARICOSE VEINS OF BOTH LEGS WITH EDEMA: ICD-10-CM

## 2019-09-17 DIAGNOSIS — Z98.890 S/P CARDIAC CATH: ICD-10-CM

## 2019-09-17 DIAGNOSIS — E11.621 DIABETIC ULCER OF TOE OF RIGHT FOOT ASSOCIATED WITH TYPE 2 DIABETES MELLITUS, WITH FAT LAYER EXPOSED (HCC): ICD-10-CM

## 2019-09-17 DIAGNOSIS — E11.40 TYPE 2 DIABETES, CONTROLLED, WITH NEUROPATHY (HCC): ICD-10-CM

## 2019-09-17 DIAGNOSIS — Z95.1 S/P CABG X 3: ICD-10-CM

## 2019-09-17 DIAGNOSIS — I25.10 CORONARY ARTERY DISEASE INVOLVING NATIVE CORONARY ARTERY OF NATIVE HEART WITHOUT ANGINA PECTORIS: ICD-10-CM

## 2019-09-17 DIAGNOSIS — Z95.2 S/P AVR (AORTIC VALVE REPLACEMENT): ICD-10-CM

## 2019-09-17 PROCEDURE — 93922 UPR/L XTREMITY ART 2 LEVELS: CPT | Performed by: INTERNAL MEDICINE

## 2019-09-17 PROCEDURE — 93930 UPPER EXTREMITY STUDY: CPT | Performed by: INTERNAL MEDICINE

## 2019-09-18 ENCOUNTER — TELEPHONE (OUTPATIENT)
Dept: CARDIOLOGY CLINIC | Age: 79
End: 2019-09-18

## 2019-09-18 LAB
A/G RATIO: 2 (CALC) (ref 0.8–2.6)
ALBUMIN SERPL-MCNC: 4.7 GM/DL (ref 3.5–5.2)
ALP BLD-CCNC: 75 U/L (ref 23–144)
ALT SERPL-CCNC: 28 U/L (ref 0–60)
AST SERPL-CCNC: 28 U/L (ref 0–55)
BASOPHILS ABSOLUTE: 0.1 K/MM3 (ref 0–0.3)
BASOPHILS RELATIVE PERCENT: 1.2 % (ref 0–2)
BILIRUB SERPL-MCNC: 1.2 MG/DL (ref 0–1.2)
BUN / CREAT RATIO: 13 (CALC) (ref 7–25)
BUN BLDV-MCNC: 10 MG/DL (ref 3–29)
CALCIUM SERPL-MCNC: 9.5 MG/DL (ref 8.5–10.5)
CHLORIDE BLD-SCNC: 99 MEQ/L (ref 96–110)
CHOLESTEROL, TOTAL: 120 MG/DL
CO2: 32 MEQ/L (ref 19–32)
CREAT SERPL-MCNC: 0.8 MG/DL
EOSINOPHILS ABSOLUTE: 0.3 K/MM3 (ref 0–0.6)
EOSINOPHILS RELATIVE PERCENT: 5.2 % (ref 0–7)
GFR SERPL CREATININE-BSD FRML MDRD: 85 ML/MIN/1.73M2
GLOBULIN: 2.4 GM/DL (CALC) (ref 1.9–3.6)
GLUCOSE BLD-MCNC: 157 MG/DL
HBA1C MFR BLD: 8.2 % TOTAL HGB
HCT VFR BLD CALC: 41.1 % (ref 41–50)
HDLC SERPL-MCNC: 44 MG/DL
HEMOGLOBIN: 13.7 G/DL (ref 13.8–17.2)
LDL CHOLESTEROL: 44 MG/DL (CALC)
LEUKOCYTES, UA: 6.4 K/MM3 (ref 3.8–10.8)
LYMPHOCYTES ABSOLUTE: 2.2 K/MM3 (ref 0.9–4.1)
LYMPHOCYTES RELATIVE PERCENT: 34.1 % (ref 14–51)
MCH RBC QN AUTO: 29.4 PG (ref 27–33)
MCHC RBC AUTO-ENTMCNC: 33.3 G/DL (ref 32–36)
MCV RBC AUTO: 88.4 FL (ref 80–100)
MONOCYTES ABSOLUTE: 0.7 K/MM3 (ref 0.2–1.1)
MONOCYTES RELATIVE PERCENT: 10.7 % (ref 0–14)
NEUTROPHILS ABSOLUTE: 3.1 K/MM3 (ref 1.5–7.8)
PDW BLD-RTO: 17.9 % (ref 9–15)
PLATELET # BLD: 225 K/MM3 (ref 130–400)
POTASSIUM SERPL-SCNC: 4.4 MEQ/L (ref 3.4–5.3)
RBC # BLD: 4.64 M/MM3 (ref 4.4–5.8)
SEGMENTED NEUTROPHILS RELATIVE PERCENT: 48.8 % (ref 40–76)
SODIUM BLD-SCNC: 140 MEQ/L (ref 135–148)
TOTAL PROTEIN: 7.1 GM/DL (ref 6–8.3)
TRIGL SERPL-MCNC: 159 MG/DL
VLDLC SERPL CALC-MCNC: 32 MG/DL (CALC) (ref 4–38)

## 2019-09-20 ENCOUNTER — HOSPITAL ENCOUNTER (OUTPATIENT)
Dept: WOUND CARE | Age: 79
Discharge: HOME | End: 2019-09-20
Payer: COMMERCIAL

## 2019-09-20 ENCOUNTER — OFFICE VISIT (OUTPATIENT)
Dept: INTERNAL MEDICINE CLINIC | Age: 79
End: 2019-09-20
Payer: COMMERCIAL

## 2019-09-20 VITALS
BODY MASS INDEX: 33 KG/M2 | SYSTOLIC BLOOD PRESSURE: 114 MMHG | RESPIRATION RATE: 18 BRPM | WEIGHT: 230 LBS | OXYGEN SATURATION: 90 % | DIASTOLIC BLOOD PRESSURE: 80 MMHG | HEART RATE: 80 BPM

## 2019-09-20 VITALS
RESPIRATION RATE: 16 BRPM | TEMPERATURE: 99.2 F | HEART RATE: 67 BPM | SYSTOLIC BLOOD PRESSURE: 159 MMHG | DIASTOLIC BLOOD PRESSURE: 69 MMHG

## 2019-09-20 DIAGNOSIS — E78.00 HYPERCHOLESTEREMIA: ICD-10-CM

## 2019-09-20 DIAGNOSIS — E11.621 DIABETIC ULCER OF TOE OF RIGHT FOOT ASSOCIATED WITH TYPE 2 DIABETES MELLITUS, WITH FAT LAYER EXPOSED (HCC): Primary | ICD-10-CM

## 2019-09-20 DIAGNOSIS — E11.40 TYPE 2 DIABETES, CONTROLLED, WITH NEUROPATHY (HCC): Primary | ICD-10-CM

## 2019-09-20 DIAGNOSIS — L97.512 DIABETIC ULCER OF TOE OF RIGHT FOOT ASSOCIATED WITH TYPE 2 DIABETES MELLITUS, WITH FAT LAYER EXPOSED (HCC): Primary | ICD-10-CM

## 2019-09-20 DIAGNOSIS — K21.9 GASTROESOPHAGEAL REFLUX DISEASE WITHOUT ESOPHAGITIS: ICD-10-CM

## 2019-09-20 DIAGNOSIS — I10 ESSENTIAL HYPERTENSION: ICD-10-CM

## 2019-09-20 PROCEDURE — 99214 OFFICE O/P EST MOD 30 MIN: CPT | Performed by: INTERNAL MEDICINE

## 2019-09-20 PROCEDURE — 11042 DBRDMT SUBQ TIS 1ST 20SQCM/<: CPT

## 2019-09-20 NOTE — PROGRESS NOTES
management:                          EMOX weight off wounds and reposition every 2 hours.                          ORMIC standing for long periods of time.                          TENLM wraps/stockings in AM and remove at bedtime.                          If swelling is present, elevate legs to the level of the heart or above for 30                              minutes 4-5 times a day and/or when sitting.                                             When taking antibiotics take entire prescription as ordered by physician                             do not stop taking until medicine is all gone.                                                       Orders for this week:    9-20-19     Front Offloading shoe--8-30-19     Right Plantar Great Toe--- Clean with soap and water, pat dry. Apply Muperocin (bactroban) to wound bed and cover with Fibracol to wound bed, cover with fluff 2x2, Wrap with Conform and secure with tape. ----Change Daily     Follow up with  Dr Karlos Geiger 1 weeks in the wound care center  Call [phone] for any questions or concerns.   Date__________   Time____________             Treatment Note Wound 08/30/19 Toe (Comment  which one) Right;Plantar #1 GR Diabetic Aleta De 2-Dressing/Treatment: Fern Nader, fibracol, 2x2, conform & tape)    Written Patient Dismissal Instructions Given            Electronically signed by Kartik Dobson MD on 9/20/2019 at 4:54 PM

## 2019-09-20 NOTE — PROGRESS NOTES
Bam Mack  1940 09/20/19    SUBJECTIVE:    Pt has been going to the wound clinic for the toe ulcer. arterial US was (-). Patient's reflux well controlled on current medications. Patient denies any blood in stool black stool, heartburn, reflux. He is worried about the zantac and concerns about carcinogens. Patient compliant with medications for diabetes. Blood sugars have averaged around 120-140, with pt checking blood sugar 2 time daily. Patient has had no episodes of significant hypoglycemia. Blood sugars have been labile. He is increasing his exercise. Patient denies any chest pain, shortness of breath, myalgias, Patient is tolerating cholesterol medications without difficulty. He has been using hemp oil to help neuropathy discomfort with some benefit. He is able to walk better. He is using the bicycle more. OBJECTIVE:    /80   Pulse 80   Resp 18   Wt 230 lb (104.3 kg)   SpO2 90%   BMI 33.00 kg/m²     Physical Exam   Constitutional: He is oriented to person, place, and time. He appears well-developed and well-nourished. Eyes: Conjunctivae are normal. No scleral icterus. Neck: Neck supple. No JVD present. No tracheal deviation present. No thyromegaly present. Cardiovascular: Normal rate, regular rhythm and intact distal pulses. Murmur heard. Systolic murmur is present with a grade of 2/6. Pulmonary/Chest: Effort normal and breath sounds normal. No respiratory distress. Abdominal: Soft. He exhibits no distension and no mass. There is no hepatosplenomegaly. There is no tenderness. There is no rebound, no guarding and no CVA tenderness. Musculoskeletal: He exhibits edema (1+ to mid calf). Lymphadenopathy:     He has no cervical adenopathy. Neurological: He is alert and oriented to person, place, and time. Nonfocal   Skin: No cyanosis. Nails show no clubbing. Psychiatric: He has a normal mood and affect.  His behavior is normal. Judgment normal.

## 2019-09-27 ENCOUNTER — HOSPITAL ENCOUNTER (OUTPATIENT)
Dept: WOUND CARE | Age: 79
Discharge: HOME | End: 2019-09-27
Payer: COMMERCIAL

## 2019-09-27 ENCOUNTER — NURSE ONLY (OUTPATIENT)
Dept: INTERNAL MEDICINE CLINIC | Age: 79
End: 2019-09-27
Payer: COMMERCIAL

## 2019-09-27 VITALS
DIASTOLIC BLOOD PRESSURE: 74 MMHG | HEART RATE: 74 BPM | SYSTOLIC BLOOD PRESSURE: 148 MMHG | TEMPERATURE: 97.8 F | RESPIRATION RATE: 15 BRPM

## 2019-09-27 DIAGNOSIS — Z23 NEEDS FLU SHOT: Primary | ICD-10-CM

## 2019-09-27 DIAGNOSIS — E11.621 DIABETIC ULCER OF TOE OF RIGHT FOOT ASSOCIATED WITH TYPE 2 DIABETES MELLITUS, WITH FAT LAYER EXPOSED (HCC): Primary | ICD-10-CM

## 2019-09-27 DIAGNOSIS — L97.512 DIABETIC ULCER OF TOE OF RIGHT FOOT ASSOCIATED WITH TYPE 2 DIABETES MELLITUS, WITH FAT LAYER EXPOSED (HCC): Primary | ICD-10-CM

## 2019-09-27 PROCEDURE — G0008 ADMIN INFLUENZA VIRUS VAC: HCPCS | Performed by: INTERNAL MEDICINE

## 2019-09-27 PROCEDURE — 90662 IIV NO PRSV INCREASED AG IM: CPT | Performed by: INTERNAL MEDICINE

## 2019-09-27 PROCEDURE — 99213 OFFICE O/P EST LOW 20 MIN: CPT

## 2019-09-27 RX ORDER — LIDOCAINE HYDROCHLORIDE 40 MG/ML
SOLUTION TOPICAL ONCE
Status: DISCONTINUED | OUTPATIENT
Start: 2019-09-27 | End: 2019-09-28 | Stop reason: HOSPADM

## 2019-09-27 NOTE — PROGRESS NOTES
Former Smoker     Packs/day: 2.00     Years: 45.00     Pack years: 90.00     Types: Cigarettes, Pipe     Start date: 1955     Last attempt to quit: 2000     Years since quittin.2    Smokeless tobacco: Never Used   Substance Use Topics    Alcohol use: Yes     Alcohol/week: 14.0 standard drinks     Types: 14 Standard drinks or equivalent per week     Comment: \"2 Bottles Of [employer] A Day\"    Drug use: No       ALLERGIES    Allergies   Allergen Reactions    Betadine [Povidone Iodine] Itching and Rash       MEDICATIONS    Current Outpatient Medications on File Prior to Encounter   Medication Sig Dispense Refill    albuterol sulfate  (90 Base) MCG/ACT inhaler Inhale 2 puffs into the lungs every 6 hours as needed for Wheezing 1 Inhaler 3    furosemide (LASIX) 40 MG tablet Take 1 tablet by mouth daily 90 tablet 3    diltiazem (CARDIZEM CD) 120 MG extended release capsule Take 1 capsule by mouth daily 90 capsule 3    carvedilol (COREG) 25 MG tablet Take 1 tablet by mouth 2 times daily 180 tablet 3    atorvastatin (LIPITOR) 40 MG tablet TAKE ONE TABLET BY MOUTH DAILY 90 tablet 3    fluticasone (FLONASE) 50 MCG/ACT nasal spray 2 sprays by Each Nostril route daily 1 Bottle 5    memantine (NAMENDA) 5 MG tablet Take 1 tablet by mouth 2 times daily 60 tablet 5    Coenzyme Q10 (COQ10) 400 MG CAPS Take by mouth every morning Over The Counter      Magnesium 500 MG CAPS Take by mouth every evening Over The Counter      aspirin 81 MG EC tablet Take 81 mg by mouth nightly Over The Counter       No current facility-administered medications on file prior to encounter. REVIEW OF SYSTEMS    Pertinent items are noted in HPI. Constitutional: Negative for systemic symptoms including fever, chills and malaise. Objective:      BP (!) 148/74   Pulse 74   Temp 97.8 °F (36.6 °C) (Temporal)   Resp 15     PHYSICAL EXAM      General: The patient is in no acute distress.     Mental status:  Patient is appropriate, is  oriented to place and plan of care. Dermatologic exam: Visual inspection of the periwound reveals the skin to be normal in turgor and texture. Wound exam:  see wound description below     All active wounds listed below with today's date are evaluated      Incision 07/12/16 Chest Mid (Active)   Number of days: 1172       Incision 07/12/16 Leg (Active)   Number of days: 1172       Wound 08/30/19 Toe (Comment  which one) Right;Plantar #1 GR Diabetic Garcia 2 (Active)   Wound Image   9/20/2019  2:54 PM   Wound Diabetic Garcia 2 9/27/2019  3:37 PM   Offloading for Diabetic Foot Ulcers Forefoot offloading shoe 9/27/2019  3:37 PM   Dressing Status Clean;Dry; Intact 9/27/2019  4:27 PM   Dressing Changed Changed/New 9/27/2019  4:27 PM   Wound Cleansed Rinsed/Irrigated with saline 9/27/2019  3:37 PM   Wound Length (cm) 0.8 cm 9/27/2019  3:37 PM   Wound Width (cm) 0.8 cm 9/27/2019  3:37 PM   Wound Depth (cm) 0.1 cm 9/27/2019  3:37 PM   Wound Surface Area (cm^2) 0.64 cm^2 9/27/2019  3:37 PM   Change in Wound Size % (l*w) 46.67 9/27/2019  3:37 PM   Wound Volume (cm^3) 0.06 cm^3 9/27/2019  3:37 PM   Wound Healing % 50 9/27/2019  3:37 PM   Post-Procedure Length (cm) 1 cm 9/20/2019  3:24 PM   Post-Procedure Width (cm) 1.1 cm 9/20/2019  3:24 PM   Post-Procedure Depth (cm) 0.1 cm 9/20/2019  3:24 PM   Post-Procedure Surface Area (cm^2) 1.1 cm^2 9/20/2019  3:24 PM   Post-Procedure Volume (cm^3) 0.11 cm^3 9/20/2019  3:24 PM   Distance Tunneling (cm) 0 cm 9/27/2019  3:37 PM   Tunneling Position ___ O'Clock 0 9/27/2019  3:37 PM   Undermining Starts ___ O'Clock 0 9/27/2019  3:37 PM   Undermining Ends___ O'Clock 0 9/27/2019  3:37 PM   Undermining Maxium Distance (cm) 0 9/27/2019  3:37 PM   Wound Assessment Pink 9/27/2019  3:37 PM   Drainage Amount Moderate 9/27/2019  3:37 PM   Drainage Description Serosanguinous 9/27/2019  3:37 PM   Odor None 9/27/2019  3:37 PM   Margins Defined edges 9/27/2019  3:37 PM   Jewels-wound

## 2019-10-03 ENCOUNTER — OFFICE VISIT (OUTPATIENT)
Dept: CARDIOLOGY CLINIC | Age: 79
End: 2019-10-03
Payer: COMMERCIAL

## 2019-10-03 VITALS
HEIGHT: 71 IN | WEIGHT: 228.2 LBS | SYSTOLIC BLOOD PRESSURE: 116 MMHG | DIASTOLIC BLOOD PRESSURE: 72 MMHG | HEART RATE: 71 BPM | BODY MASS INDEX: 31.95 KG/M2

## 2019-10-03 DIAGNOSIS — I10 ESSENTIAL HYPERTENSION: ICD-10-CM

## 2019-10-03 DIAGNOSIS — E11.40 TYPE 2 DIABETES, CONTROLLED, WITH NEUROPATHY (HCC): ICD-10-CM

## 2019-10-03 DIAGNOSIS — L97.512 DIABETIC ULCER OF TOE OF RIGHT FOOT ASSOCIATED WITH TYPE 2 DIABETES MELLITUS, WITH FAT LAYER EXPOSED (HCC): ICD-10-CM

## 2019-10-03 DIAGNOSIS — I25.10 CORONARY ARTERY DISEASE INVOLVING NATIVE CORONARY ARTERY OF NATIVE HEART WITHOUT ANGINA PECTORIS: Primary | ICD-10-CM

## 2019-10-03 DIAGNOSIS — Z98.890 S/P CARDIAC CATH: ICD-10-CM

## 2019-10-03 DIAGNOSIS — Z95.2 S/P AVR (AORTIC VALVE REPLACEMENT): ICD-10-CM

## 2019-10-03 DIAGNOSIS — I38 VHD (VALVULAR HEART DISEASE): ICD-10-CM

## 2019-10-03 DIAGNOSIS — Z95.1 S/P CABG X 3: ICD-10-CM

## 2019-10-03 DIAGNOSIS — E78.00 HYPERCHOLESTEREMIA: ICD-10-CM

## 2019-10-03 DIAGNOSIS — E11.621 DIABETIC ULCER OF TOE OF RIGHT FOOT ASSOCIATED WITH TYPE 2 DIABETES MELLITUS, WITH FAT LAYER EXPOSED (HCC): ICD-10-CM

## 2019-10-03 DIAGNOSIS — I83.893 VARICOSE VEINS OF BOTH LEGS WITH EDEMA: ICD-10-CM

## 2019-10-03 PROCEDURE — 99214 OFFICE O/P EST MOD 30 MIN: CPT | Performed by: INTERNAL MEDICINE

## 2019-10-10 DIAGNOSIS — I25.810 CORONARY ARTERY DISEASE INVOLVING CORONARY BYPASS GRAFT OF NATIVE HEART WITHOUT ANGINA PECTORIS: ICD-10-CM

## 2019-10-10 DIAGNOSIS — I10 ESSENTIAL HYPERTENSION: ICD-10-CM

## 2019-10-10 RX ORDER — CARVEDILOL 25 MG/1
25 TABLET ORAL 2 TIMES DAILY
Qty: 180 TABLET | Refills: 3 | Status: SHIPPED | OUTPATIENT
Start: 2019-10-10 | End: 2020-01-30 | Stop reason: SDUPTHER

## 2019-10-11 ENCOUNTER — HOSPITAL ENCOUNTER (OUTPATIENT)
Dept: WOUND CARE | Age: 79
Discharge: HOME | End: 2019-10-11
Payer: COMMERCIAL

## 2019-11-05 ENCOUNTER — TELEPHONE (OUTPATIENT)
Dept: WOUND CARE | Age: 79
End: 2019-11-05

## 2019-11-20 ENCOUNTER — CARE COORDINATION (OUTPATIENT)
Dept: CARE COORDINATION | Age: 79
End: 2019-11-20

## 2019-11-22 ENCOUNTER — CARE COORDINATION (OUTPATIENT)
Dept: CARE COORDINATION | Age: 79
End: 2019-11-22

## 2019-12-18 ENCOUNTER — TELEPHONE (OUTPATIENT)
Dept: INTERNAL MEDICINE CLINIC | Age: 79
End: 2019-12-18

## 2019-12-18 ENCOUNTER — OFFICE VISIT (OUTPATIENT)
Dept: INTERNAL MEDICINE CLINIC | Age: 79
End: 2019-12-18
Payer: COMMERCIAL

## 2019-12-18 VITALS
WEIGHT: 235 LBS | HEART RATE: 84 BPM | DIASTOLIC BLOOD PRESSURE: 80 MMHG | RESPIRATION RATE: 18 BRPM | BODY MASS INDEX: 33.24 KG/M2 | OXYGEN SATURATION: 93 % | SYSTOLIC BLOOD PRESSURE: 118 MMHG

## 2019-12-18 DIAGNOSIS — R41.3 MEMORY DEFICIT: ICD-10-CM

## 2019-12-18 DIAGNOSIS — E78.00 HYPERCHOLESTEREMIA: ICD-10-CM

## 2019-12-18 DIAGNOSIS — R41.3 MEMORY LOSS: ICD-10-CM

## 2019-12-18 DIAGNOSIS — I10 ESSENTIAL HYPERTENSION: ICD-10-CM

## 2019-12-18 DIAGNOSIS — E11.40 TYPE 2 DIABETES, CONTROLLED, WITH NEUROPATHY (HCC): Primary | ICD-10-CM

## 2019-12-18 PROCEDURE — 99214 OFFICE O/P EST MOD 30 MIN: CPT | Performed by: INTERNAL MEDICINE

## 2019-12-18 RX ORDER — MEMANTINE HYDROCHLORIDE 5 MG/1
5 TABLET ORAL 2 TIMES DAILY
Qty: 180 TABLET | Refills: 3 | Status: SHIPPED | OUTPATIENT
Start: 2019-12-18 | End: 2020-01-30 | Stop reason: SDUPTHER

## 2020-01-30 RX ORDER — FUROSEMIDE 40 MG/1
40 TABLET ORAL DAILY
Qty: 90 TABLET | Refills: 3 | Status: SHIPPED | OUTPATIENT
Start: 2020-01-30 | End: 2020-09-28 | Stop reason: SDUPTHER

## 2020-01-30 RX ORDER — CARVEDILOL 25 MG/1
25 TABLET ORAL 2 TIMES DAILY
Qty: 180 TABLET | Refills: 3 | Status: SHIPPED | OUTPATIENT
Start: 2020-01-30 | End: 2020-08-03 | Stop reason: SDUPTHER

## 2020-01-30 RX ORDER — ATORVASTATIN CALCIUM 40 MG/1
TABLET, FILM COATED ORAL
Qty: 90 TABLET | Refills: 3 | Status: SHIPPED | OUTPATIENT
Start: 2020-01-30 | End: 2020-05-13 | Stop reason: SDUPTHER

## 2020-01-30 RX ORDER — ALBUTEROL SULFATE 90 UG/1
AEROSOL, METERED RESPIRATORY (INHALATION)
Qty: 18 G | Refills: 2 | Status: SHIPPED | OUTPATIENT
Start: 2020-01-30 | End: 2021-03-19 | Stop reason: SDUPTHER

## 2020-01-30 RX ORDER — FLUTICASONE PROPIONATE 50 MCG
2 SPRAY, SUSPENSION (ML) NASAL DAILY
Qty: 1 BOTTLE | Refills: 5 | Status: SHIPPED | OUTPATIENT
Start: 2020-01-30 | End: 2020-11-16 | Stop reason: SDUPTHER

## 2020-01-30 RX ORDER — DILTIAZEM HYDROCHLORIDE 120 MG/1
120 CAPSULE, COATED, EXTENDED RELEASE ORAL DAILY
Qty: 90 CAPSULE | Refills: 3 | Status: SHIPPED | OUTPATIENT
Start: 2020-01-30 | End: 2020-06-15 | Stop reason: SDUPTHER

## 2020-01-30 RX ORDER — MEMANTINE HYDROCHLORIDE 5 MG/1
5 TABLET ORAL 2 TIMES DAILY
Qty: 180 TABLET | Refills: 3 | Status: SHIPPED | OUTPATIENT
Start: 2020-01-30 | End: 2020-03-06 | Stop reason: SINTOL

## 2020-02-17 ENCOUNTER — CARE COORDINATION (OUTPATIENT)
Dept: CARE COORDINATION | Age: 80
End: 2020-02-17

## 2020-02-17 NOTE — CARE COORDINATION
Historical Provider, MD   Magnesium 500 MG CAPS Take by mouth every evening Over The Counter    Historical Provider, MD   aspirin 81 MG EC tablet Take 81 mg by mouth nightly Over The Counter    Historical Provider, MD       Future Appointments   Date Time Provider Mian Johnson   3/16/2020  9:45 AM Teddy Whitfield MD [facility]   4/8/2020 10:30 AM Keerthi Yip MD [facility]     ,   Diabetes Assessment    Medic Alert ID:  No  Meal Planning:  Avoidance of concentrated sweets   How often do you test your blood sugar?:  Daily   Do you have barriers with adherence to non-pharmacologic self-management interventions?  (Nutrition/Exercise/Self-Monitoring):  No   Have you ever had to go to the ED for symptoms of low blood sugar?:  No       No patient-reported symptoms       and   COPD Assessment    Does the patient understand envrionmental exposure?:  Yes  Is the patient able to verbalize Rescue vs. Long Acting medications?:  No  Does the patient have a nebulizer?:  No  Does the patient use a space with inhaled medications?:  No     No patient-reported symptoms         Symptoms:      Have you had a recent diagnosis of pneumonia either by PCP or at a hospital?:  No

## 2020-02-18 ENCOUNTER — CARE COORDINATION (OUTPATIENT)
Dept: CARE COORDINATION | Age: 80
End: 2020-02-18

## 2020-02-18 NOTE — CARE COORDINATION
[facility] received approval from Dr Tracie Shipman to graduate pt from [facility].  [facility] mailed pt graduation letter and removed from [facility] list.

## 2020-02-18 NOTE — LETTER
2/18/2020    [facility]  [address] [place] [address]      Romelia Winston     Congratulations on the progress you have made improving and taking charge of your health! Your recent follow up with Amanda Winters MD finds you doing well and are no longer in need of Care Coordination services. I know you will continue to use the knowledge and tools you have gained to continue down a healthy path. As you have demonstrated that you are able to successfully manage your health and wellness, I will no longer contact you on a regular basis. Again, congratulations and please know if there are any changes or you have a need for my services in the future, you may always contact me for questions or concerns.   In good health,       Yeimi Reeves RN

## 2020-03-06 ENCOUNTER — OFFICE VISIT (OUTPATIENT)
Dept: INTERNAL MEDICINE CLINIC | Age: 80
End: 2020-03-06
Payer: MEDICARE

## 2020-03-06 ENCOUNTER — TELEPHONE (OUTPATIENT)
Dept: INTERNAL MEDICINE CLINIC | Age: 80
End: 2020-03-06

## 2020-03-06 VITALS
RESPIRATION RATE: 18 BRPM | WEIGHT: 239.6 LBS | DIASTOLIC BLOOD PRESSURE: 64 MMHG | SYSTOLIC BLOOD PRESSURE: 124 MMHG | BODY MASS INDEX: 33.89 KG/M2 | HEART RATE: 74 BPM | OXYGEN SATURATION: 92 %

## 2020-03-06 PROCEDURE — 4040F PNEUMOC VAC/ADMIN/RCVD: CPT | Performed by: INTERNAL MEDICINE

## 2020-03-06 PROCEDURE — 1036F TOBACCO NON-USER: CPT | Performed by: INTERNAL MEDICINE

## 2020-03-06 PROCEDURE — 3023F SPIROM DOC REV: CPT | Performed by: INTERNAL MEDICINE

## 2020-03-06 PROCEDURE — G8482 FLU IMMUNIZE ORDER/ADMIN: HCPCS | Performed by: INTERNAL MEDICINE

## 2020-03-06 PROCEDURE — G8427 DOCREV CUR MEDS BY ELIG CLIN: HCPCS | Performed by: INTERNAL MEDICINE

## 2020-03-06 PROCEDURE — G8926 SPIRO NO PERF OR DOC: HCPCS | Performed by: INTERNAL MEDICINE

## 2020-03-06 PROCEDURE — 1123F ACP DISCUSS/DSCN MKR DOCD: CPT | Performed by: INTERNAL MEDICINE

## 2020-03-06 PROCEDURE — 99213 OFFICE O/P EST LOW 20 MIN: CPT | Performed by: INTERNAL MEDICINE

## 2020-03-06 PROCEDURE — G8417 CALC BMI ABV UP PARAM F/U: HCPCS | Performed by: INTERNAL MEDICINE

## 2020-03-06 RX ORDER — BENZONATATE 100 MG/1
100 CAPSULE ORAL 3 TIMES DAILY PRN
Qty: 30 CAPSULE | Refills: 0 | Status: SHIPPED | OUTPATIENT
Start: 2020-03-06 | End: 2020-03-13

## 2020-03-06 RX ORDER — PREDNISONE 10 MG/1
TABLET ORAL
Qty: 20 TABLET | Refills: 0 | Status: SHIPPED | OUTPATIENT
Start: 2020-03-06 | End: 2020-03-23

## 2020-03-06 RX ORDER — AZITHROMYCIN 250 MG/1
TABLET, FILM COATED ORAL
Qty: 1 PACKET | Refills: 0 | Status: SHIPPED | OUTPATIENT
Start: 2020-03-06 | End: 2020-03-23

## 2020-03-06 ASSESSMENT — PATIENT HEALTH QUESTIONNAIRE - PHQ9
SUM OF ALL RESPONSES TO PHQ9 QUESTIONS 1 & 2: 0
SUM OF ALL RESPONSES TO PHQ QUESTIONS 1-9: 0
1. LITTLE INTEREST OR PLEASURE IN DOING THINGS: 0
2. FEELING DOWN, DEPRESSED OR HOPELESS: 0
SUM OF ALL RESPONSES TO PHQ QUESTIONS 1-9: 0

## 2020-03-06 NOTE — PROGRESS NOTES
Blaze Gutiérrez  1940 03/08/20    SUBJECTIVE:    Pt with nasal congestion, cough productive of clear/milky mucous, nasal congestion, SOB, low grade fever. He denies wheezing, ear pain, sinus pain. OBJECTIVE:    /64   Pulse 74   Resp 18   Wt 239 lb 9.6 oz (108.7 kg)   SpO2 92%   BMI 33.89 kg/m²     Physical Exam  Constitutional:       Appearance: He is well-developed. HENT:      Right Ear: External ear normal.      Left Ear: External ear normal.      Nose: Nose normal.      Mouth/Throat:      Pharynx: No oropharyngeal exudate. Eyes:      Conjunctiva/sclera: Conjunctivae normal.   Cardiovascular:      Rate and Rhythm: Normal rate and regular rhythm. Heart sounds: Normal heart sounds. Pulmonary:      Effort: Pulmonary effort is normal.      Breath sounds: Rhonchi and rales present. Lymphadenopathy:      Cervical: No cervical adenopathy. Neurological:      Mental Status: He is alert. ASSESSMENT:    1. COPD exacerbation ([facility]        PLAN:    Alice Altamirano was seen today for cough. Diagnoses and all orders for this visit:    COPD exacerbation ([facility] - Tx COPD exacerbation with prednisone, azith, and tessalon  -     predniSONE (DELTASONE) 10 MG tablet; Take 4 tablets daily for 2 days, then 3 tablets daily for 2 days, then two tablets daily for 2 days, then one tablet daily for 2 days  -     azithromycin (ZITHROMAX Z-POPPY) 250 MG tablet; Take 2 tablets (500 mg) on Day 1, and then take 1 tablet (250 mg) on days 2 through 5.  -     benzonatate (TESSALON) 100 MG capsule;  Take 1 capsule by mouth 3 times daily as needed for Cough

## 2020-03-23 ENCOUNTER — OFFICE VISIT (OUTPATIENT)
Dept: INTERNAL MEDICINE CLINIC | Age: 80
End: 2020-03-23
Payer: MEDICARE

## 2020-03-23 VITALS
HEART RATE: 74 BPM | RESPIRATION RATE: 18 BRPM | WEIGHT: 230 LBS | OXYGEN SATURATION: 91 % | DIASTOLIC BLOOD PRESSURE: 64 MMHG | SYSTOLIC BLOOD PRESSURE: 128 MMHG | BODY MASS INDEX: 32.54 KG/M2

## 2020-03-23 PROCEDURE — 3023F SPIROM DOC REV: CPT | Performed by: INTERNAL MEDICINE

## 2020-03-23 PROCEDURE — 4040F PNEUMOC VAC/ADMIN/RCVD: CPT | Performed by: INTERNAL MEDICINE

## 2020-03-23 PROCEDURE — G8427 DOCREV CUR MEDS BY ELIG CLIN: HCPCS | Performed by: INTERNAL MEDICINE

## 2020-03-23 PROCEDURE — 1123F ACP DISCUSS/DSCN MKR DOCD: CPT | Performed by: INTERNAL MEDICINE

## 2020-03-23 PROCEDURE — G8926 SPIRO NO PERF OR DOC: HCPCS | Performed by: INTERNAL MEDICINE

## 2020-03-23 PROCEDURE — 99214 OFFICE O/P EST MOD 30 MIN: CPT | Performed by: INTERNAL MEDICINE

## 2020-03-23 PROCEDURE — G8417 CALC BMI ABV UP PARAM F/U: HCPCS | Performed by: INTERNAL MEDICINE

## 2020-03-23 PROCEDURE — G8482 FLU IMMUNIZE ORDER/ADMIN: HCPCS | Performed by: INTERNAL MEDICINE

## 2020-03-23 PROCEDURE — 1036F TOBACCO NON-USER: CPT | Performed by: INTERNAL MEDICINE

## 2020-04-03 LAB
A/G RATIO: 1.7 (CALC) (ref 0.8–2.6)
ALBUMIN SERPL-MCNC: 4.4 GM/DL (ref 3.5–5.2)
ALP BLD-CCNC: 84 U/L (ref 23–144)
ALT SERPL-CCNC: 23 U/L (ref 0–60)
AST SERPL-CCNC: 27 U/L (ref 0–55)
BASOPHILS ABSOLUTE: 0 K/MM3 (ref 0–0.3)
BASOPHILS RELATIVE PERCENT: 0.5 % (ref 0–2)
BILIRUB SERPL-MCNC: 1.1 MG/DL (ref 0–1.2)
BUN / CREAT RATIO: 16 (CALC) (ref 7–25)
BUN BLDV-MCNC: 14 MG/DL (ref 3–29)
CALCIUM SERPL-MCNC: 9.4 MG/DL (ref 8.5–10.5)
CHLORIDE BLD-SCNC: 97 MEQ/L (ref 96–110)
CHOLESTEROL, TOTAL: 126 MG/DL
CO2: 35 MEQ/L (ref 19–32)
CREAT SERPL-MCNC: 0.9 MG/DL
CREAT SERPL-MCNC: 113.4 MG/DL
EOSINOPHILS ABSOLUTE: 0.3 K/MM3 (ref 0–0.6)
EOSINOPHILS RELATIVE PERCENT: 4.2 % (ref 0–7)
GDT REPLACEMENT: NORMAL
GFR SERPL CREATININE-BSD FRML MDRD: 80 ML/MIN/1.73M2
GLOBULIN: 2.6 GM/DL (CALC) (ref 1.9–3.6)
GLUCOSE BLD-MCNC: 169 MG/DL
HBA1C MFR BLD: 8.2 % TOTAL HGB
HCT VFR BLD CALC: 39 % (ref 41–50)
HDLC SERPL-MCNC: 47 MG/DL
HEMOGLOBIN: 13.3 G/DL (ref 13.8–17.2)
LDL CHOLESTEROL: 45 MG/DL (CALC)
LEUKOCYTES, UA: 7.1 K/MM3 (ref 3.8–10.8)
LYMPHOCYTES ABSOLUTE: 2 K/MM3 (ref 0.9–4.1)
LYMPHOCYTES RELATIVE PERCENT: 28.5 % (ref 14–51)
MCH RBC QN AUTO: 29.9 PG (ref 27–33)
MCHC RBC AUTO-ENTMCNC: 34.2 G/DL (ref 32–36)
MCV RBC AUTO: 87.5 FL (ref 80–100)
MICROALBUMIN/CREAT 24H UR: 1070 MCG/DL
MICROALBUMIN/CREAT UR-RTO: 9 MCG/MG CREAT (ref 0–30)
MONOCYTES ABSOLUTE: 0.6 K/MM3 (ref 0.2–1.1)
MONOCYTES RELATIVE PERCENT: 8.8 % (ref 0–14)
NEUTROPHILS ABSOLUTE: 4.1 K/MM3 (ref 1.5–7.8)
PDW BLD-RTO: 17.1 % (ref 9–15)
PLATELET # BLD: 194 K/MM3 (ref 130–400)
POTASSIUM SERPL-SCNC: 4.2 MEQ/L (ref 3.4–5.3)
RBC # BLD: 4.46 M/MM3 (ref 4.4–5.8)
SEGMENTED NEUTROPHILS RELATIVE PERCENT: 58 % (ref 40–76)
SODIUM BLD-SCNC: 143 MEQ/L (ref 135–148)
TOTAL PROTEIN: 7 GM/DL (ref 6–8.3)
TRIGL SERPL-MCNC: 168 MG/DL
VLDLC SERPL CALC-MCNC: 34 MG/DL (CALC) (ref 4–38)

## 2020-04-06 RX ORDER — GLIPIZIDE 5 MG/1
2.5 TABLET ORAL DAILY
Qty: 15 TABLET | Refills: 3 | Status: SHIPPED | OUTPATIENT
Start: 2020-04-06 | End: 2020-06-11 | Stop reason: SDUPTHER

## 2020-04-16 NOTE — PROGRESS NOTES
Patient completed.
for this visit:    Type 2 diabetes, controlled, with neuropathy ([facility] - check labs,no change   -     Hemoglobin A1C; Future  -     MICROALBUMIN / CREATININE URINE RATIO; Future    Simple chronic bronchitis (HCC) - over the recent COPD exacerbation; no change    Coronary artery disease involving coronary bypass graft of native heart without angina pectoris - no current symptoms  -     Comprehensive Metabolic Panel; Future  -     Lipid Panel; Future    Essential hypertension - BP at goal; no change in mgmt  -     Comprehensive Metabolic Panel; Future  -     Lipid Panel; Future  -     CBC Auto Differential; Future    Hypercholesteremia - check labs, cont statin  -     Comprehensive Metabolic Panel; Future  -     Lipid Panel;  Future

## 2020-04-24 ENCOUNTER — APPOINTMENT (OUTPATIENT)
Dept: CT IMAGING | Age: 80
End: 2020-04-24
Payer: MEDICARE

## 2020-04-24 ENCOUNTER — HOSPITAL ENCOUNTER (EMERGENCY)
Age: 80
Discharge: HOME | End: 2020-04-24
Payer: MEDICARE

## 2020-04-24 ENCOUNTER — APPOINTMENT (OUTPATIENT)
Dept: GENERAL RADIOLOGY | Age: 80
End: 2020-04-24
Payer: MEDICARE

## 2020-04-24 VITALS
SYSTOLIC BLOOD PRESSURE: 157 MMHG | BODY MASS INDEX: 32.93 KG/M2 | RESPIRATION RATE: 16 BRPM | WEIGHT: 230 LBS | DIASTOLIC BLOOD PRESSURE: 79 MMHG | HEART RATE: 82 BPM | OXYGEN SATURATION: 94 % | TEMPERATURE: 98.1 F | HEIGHT: 70 IN

## 2020-04-24 LAB
ALBUMIN SERPL-MCNC: 4.2 GM/DL (ref 3.4–5)
ALP BLD-CCNC: 87 IU/L (ref 40–129)
ALT SERPL-CCNC: 17 U/L (ref 10–40)
ANION GAP SERPL CALCULATED.3IONS-SCNC: 12 MMOL/L (ref 4–16)
AST SERPL-CCNC: 18 IU/L (ref 15–37)
BACTERIA: NEGATIVE /HPF
BASOPHILS ABSOLUTE: 0.2 K/CU MM
BASOPHILS RELATIVE PERCENT: 1.3 % (ref 0–1)
BILIRUB SERPL-MCNC: 1.1 MG/DL (ref 0–1)
BILIRUBIN URINE: NEGATIVE MG/DL
BLOOD, URINE: ABNORMAL
BUN BLDV-MCNC: 13 MG/DL (ref 6–23)
CALCIUM SERPL-MCNC: 9 MG/DL (ref 8.3–10.6)
CHLORIDE BLD-SCNC: 100 MMOL/L (ref 99–110)
CHP ED QC CHECK: YES
CLARITY: CLEAR
CO2: 27 MMOL/L (ref 21–32)
COLOR: ABNORMAL
CREAT SERPL-MCNC: 0.7 MG/DL (ref 0.9–1.3)
DIFFERENTIAL TYPE: ABNORMAL
EOSINOPHILS ABSOLUTE: 0.1 K/CU MM
EOSINOPHILS RELATIVE PERCENT: 0.4 % (ref 0–3)
GFR AFRICAN AMERICAN: >60 ML/MIN/1.73M2
GFR NON-AFRICAN AMERICAN: >60 ML/MIN/1.73M2
GLUCOSE BLD-MCNC: 212 MG/DL
GLUCOSE BLD-MCNC: 212 MG/DL (ref 70–99)
GLUCOSE BLD-MCNC: 224 MG/DL (ref 70–99)
GLUCOSE, URINE: NEGATIVE MG/DL
HCT VFR BLD CALC: 42.3 % (ref 42–52)
HEMOGLOBIN: 13.6 GM/DL (ref 13.5–18)
HYALINE CASTS: 2 /LPF
IMMATURE NEUTROPHIL %: 0.9 % (ref 0–0.43)
KETONES, URINE: NEGATIVE MG/DL
LEUKOCYTE ESTERASE, URINE: NEGATIVE
LYMPHOCYTES ABSOLUTE: 1.9 K/CU MM
LYMPHOCYTES RELATIVE PERCENT: 14.8 % (ref 24–44)
MAGNESIUM: 1.9 MG/DL (ref 1.8–2.4)
MCH RBC QN AUTO: 29.6 PG (ref 27–31)
MCHC RBC AUTO-ENTMCNC: 32.2 % (ref 32–36)
MCV RBC AUTO: 92.2 FL (ref 78–100)
MONOCYTES ABSOLUTE: 0.7 K/CU MM
MONOCYTES RELATIVE PERCENT: 5.8 % (ref 0–4)
MUCUS: ABNORMAL HPF
NITRITE URINE, QUANTITATIVE: NEGATIVE
NUCLEATED RBC %: 0 %
PDW BLD-RTO: 16.4 % (ref 11.7–14.9)
PH, URINE: 5 (ref 5–8)
PLATELET # BLD: 203 K/CU MM (ref 140–440)
PMV BLD AUTO: 8.9 FL (ref 7.5–11.1)
POTASSIUM SERPL-SCNC: 4.4 MMOL/L (ref 3.5–5.1)
PRO-BNP: 196.2 PG/ML
PROTEIN UA: NEGATIVE MG/DL
RBC # BLD: 4.59 M/CU MM (ref 4.6–6.2)
RBC URINE: <1 /HPF (ref 0–3)
SEGMENTED NEUTROPHILS ABSOLUTE COUNT: 9.7 K/CU MM
SEGMENTED NEUTROPHILS RELATIVE PERCENT: 76.8 % (ref 36–66)
SODIUM BLD-SCNC: 139 MMOL/L (ref 135–145)
SPECIFIC GRAVITY UA: 1.01 (ref 1–1.03)
TOTAL IMMATURE NEUTOROPHIL: 0.12 K/CU MM
TOTAL NUCLEATED RBC: 0 K/CU MM
TOTAL PROTEIN: 7.3 GM/DL (ref 6.4–8.2)
TRICHOMONAS: ABNORMAL /HPF
TROPONIN T: <0.01 NG/ML
TROPONIN T: <0.01 NG/ML
UROBILINOGEN, URINE: NORMAL MG/DL (ref 0.2–1)
WBC # BLD: 12.7 K/CU MM (ref 4–10.5)
WBC UA: <1 /HPF (ref 0–2)

## 2020-04-24 PROCEDURE — 71045 X-RAY EXAM CHEST 1 VIEW: CPT

## 2020-04-24 PROCEDURE — 83880 ASSAY OF NATRIURETIC PEPTIDE: CPT

## 2020-04-24 PROCEDURE — 81001 URINALYSIS AUTO W/SCOPE: CPT

## 2020-04-24 PROCEDURE — 85025 COMPLETE CBC W/AUTO DIFF WBC: CPT

## 2020-04-24 PROCEDURE — 83735 ASSAY OF MAGNESIUM: CPT

## 2020-04-24 PROCEDURE — 2580000003 HC RX 258: Performed by: PHYSICIAN ASSISTANT

## 2020-04-24 PROCEDURE — 96374 THER/PROPH/DIAG INJ IV PUSH: CPT

## 2020-04-24 PROCEDURE — 6360000002 HC RX W HCPCS: Performed by: PHYSICIAN ASSISTANT

## 2020-04-24 PROCEDURE — 80053 COMPREHEN METABOLIC PANEL: CPT

## 2020-04-24 PROCEDURE — 74174 CTA ABD&PLVS W/CONTRAST: CPT

## 2020-04-24 PROCEDURE — 93005 ELECTROCARDIOGRAM TRACING: CPT | Performed by: PHYSICIAN ASSISTANT

## 2020-04-24 PROCEDURE — 96375 TX/PRO/DX INJ NEW DRUG ADDON: CPT

## 2020-04-24 PROCEDURE — 82962 GLUCOSE BLOOD TEST: CPT

## 2020-04-24 PROCEDURE — 6360000004 HC RX CONTRAST MEDICATION: Performed by: PHYSICIAN ASSISTANT

## 2020-04-24 PROCEDURE — 96361 HYDRATE IV INFUSION ADD-ON: CPT

## 2020-04-24 PROCEDURE — 71275 CT ANGIOGRAPHY CHEST: CPT

## 2020-04-24 PROCEDURE — 99284 EMERGENCY DEPT VISIT MOD MDM: CPT

## 2020-04-24 PROCEDURE — 70450 CT HEAD/BRAIN W/O DYE: CPT

## 2020-04-24 PROCEDURE — 36415 COLL VENOUS BLD VENIPUNCTURE: CPT

## 2020-04-24 PROCEDURE — 84484 ASSAY OF TROPONIN QUANT: CPT

## 2020-04-24 RX ORDER — DIPHENHYDRAMINE HYDROCHLORIDE 50 MG/ML
25 INJECTION INTRAMUSCULAR; INTRAVENOUS ONCE
Status: COMPLETED | OUTPATIENT
Start: 2020-04-24 | End: 2020-04-24

## 2020-04-24 RX ORDER — 0.9 % SODIUM CHLORIDE 0.9 %
250 INTRAVENOUS SOLUTION INTRAVENOUS ONCE
Status: COMPLETED | OUTPATIENT
Start: 2020-04-24 | End: 2020-04-24

## 2020-04-24 RX ORDER — SODIUM CHLORIDE 0.9 % (FLUSH) 0.9 %
10 SYRINGE (ML) INJECTION PRN
Status: DISCONTINUED | OUTPATIENT
Start: 2020-04-24 | End: 2020-04-24 | Stop reason: HOSPADM

## 2020-04-24 RX ORDER — METHYLPREDNISOLONE SODIUM SUCCINATE 125 MG/2ML
40 INJECTION, POWDER, LYOPHILIZED, FOR SOLUTION INTRAMUSCULAR; INTRAVENOUS ONCE
Status: COMPLETED | OUTPATIENT
Start: 2020-04-24 | End: 2020-04-24

## 2020-04-24 RX ADMIN — DIPHENHYDRAMINE HYDROCHLORIDE 25 MG: 50 INJECTION, SOLUTION INTRAMUSCULAR; INTRAVENOUS at 12:51

## 2020-04-24 RX ADMIN — METHYLPREDNISOLONE SODIUM SUCCINATE 40 MG: 125 INJECTION, POWDER, FOR SOLUTION INTRAMUSCULAR; INTRAVENOUS at 12:52

## 2020-04-24 RX ADMIN — IOPAMIDOL 100 ML: 755 INJECTION, SOLUTION INTRAVENOUS at 13:43

## 2020-04-24 RX ADMIN — SODIUM CHLORIDE 250 ML: 9 INJECTION, SOLUTION INTRAVENOUS at 12:50

## 2020-04-24 ASSESSMENT — PAIN SCALES - GENERAL: PAINLEVEL_OUTOF10: 2

## 2020-04-24 NOTE — ED PROVIDER NOTES
12 lead EKG per my interpretation:  Normal Sinus Rhythm at 75 with 1st degree AV block  Axis is   Left axis deviation  QTc is  within an acceptable range  There is no specific T wave changes appreciated. There is no specific ST wave changes appreciated. No STEMI    Prior EKG to compare with was available and no clinically significant change in overall morphology.     Sotero Almonte MD  04/24/20 012

## 2020-04-24 NOTE — ED PROVIDER NOTES
EMERGENCY DEPARTMENT ENCOUNTER      PCP: Doris Gunderson MD    [address] [facility]    Chief Complaint   Patient presents with    Abdominal Pain     started this morning    Emesis       Of note, this patient was not evaluated by attending physician. ARIANA Tom is a [de-identified] y.o. male who presents to the emergency department today via EMS after having an episode of unresponsiveness/syncopal episode prior to arrival.  Patient states that he was at his normal state of health last evening, he awoke and said he had a slight pain/discomfort in his lower abdomen. He states that he went about his day normally he then was sitting at the breakfast table and he states that he felt mildly dizzy. He states that he then [facility]" and was still at the table but his wife was panicked and states that he had passed out. She had already called EMS at this point. He was brought to the emergency department. He has history of valvular heart disease/aortic stenosis. He also has had echocardiogram that showed mild dilatation of his aorta. He has never had history of an aortic aneurysm or dissection. He does admit that he has had open heart surgery and valvular heart surgery. States that he has been doing really well, he states that he is usually very active and has not had any episodes of dizziness or syncopal episodes in his past.  No seizure-like activity. No recent fall or head trauma. States he has been feeling well, no recent upper respiratory illness. No dark-colored stools or bright red blood per the rectum, having normal bowel movements. No new urinary symptoms. REVIEW OF SYSTEMS    Constitutional:  Denies fever, chills, weight loss or weakness   HENT:  Denies sore throat or ear pain   Cardiovascular: + unresponsiveness/syncope, denies chest pain, palpitations   Respiratory:  Denies cough or shortness of breath    GI: Admits to mild lower abdominal pain.  No nausea, vomiting, or diarrhea  :  Denies valvulopathy seen. No evidence of pericardial effusion. 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 Fang - L2-S1 laminectomy and forototomy with L4-S1 fusion and fixation    CARDIAC CATHETERIZATION  06/21/2016   [facility] CARDIAC SURGERY  07/12/2016    Aortiv valve repair # 22 medtronic tissue valve, CABG x3 SVG to circ, SVG to PDA and LIMA to LAD    CHOLECYSTECTOMY, LAPAROSCOPIC  2/10    Dr. Maegan Mccollum      All Teeth Extracted In Past    ENDOSCOPY, COLON, DIAGNOSTIC  09/2013   [facility] RECTAL SURGERY  1980's    Benign Rectal Cyst Removed    THORACENTESIS Right 07/14/2016    Dr Vernon Wong  1940's       CURRENT MEDICATIONS    Current Outpatient Rx   Medication Sig Dispense Refill    glipiZIDE (GLUCOTROL) 5 MG tablet Take 0.5 tablets by mouth daily 15 tablet 3    albuterol sulfate  (90 Base) MCG/ACT inhaler INHALE TWO PUFFS BY MOUTH (INTO THE LUNGS) EVERY 6 HOURS AS NEEDED FOR WHEEZING 18 g 2    carvedilol (COREG) 25 MG tablet Take 1 tablet by mouth 2 times daily 180 tablet 3    furosemide (LASIX) 40 MG tablet Take 1 tablet by mouth daily 90 tablet 3    diltiazem (CARDIZEM CD) 120 MG extended release capsule Take 1 capsule by mouth daily 90 capsule 3    atorvastatin (LIPITOR) 40 MG tablet TAKE ONE TABLET BY MOUTH DAILY 90 tablet 3    fluticasone (FLONASE) 50 MCG/ACT nasal spray 2 sprays by Each Nostril route daily 1 Bottle 5    Coenzyme Q10 History   Problem Relation Age of Onset    Stroke Father     Heart Disease Father         \"Aortic Valve, 4 Bypasses\"   [facility] Alzheimer's Disease Mother     Stroke Mother     Depression Mother     Cancer Brother         Bladder Cancer    Heart Disease Brother         Heart Stents    Cancer Sister         Lung Cancer    Diabetes Son          PHYSICAL EXAM    VITAL SIGNS: BP (!) 157/79   Pulse 82   Temp 98.1 °F (36.7 °C) (Oral)   Resp 16   Ht 5' 10\" (1.778 m)   Wt 230 lb (104.3 kg)   SpO2 94%   BMI 33.00 kg/m²    Constitutional:  Well developed, Well nourished  HENT:  Normocephalic, Atraumatic, PERRL. EOMI. Sclera clear. Conjunctiva normal, No discharge. Neck/Lymphatics: supple, no JVD, no swollen nodes  Cardiovascular:  Rate regular, normal Rhythm,  no murmurs/rubs/gallops. No JVD  No carotid bruits or murmurs heard in carotids. Strong peripheral pulses to the bilateral upper and lower extremity. Adequate sensation  Respiratory:  Nonlabored breathing. Normal breath sounds, No wheezing  Abdomen: Bowel sounds normal, Soft, No tenderness, no masses. No obvious distention. No pulsatile mass  Musculoskeletal:    There is no edema, asymmetry, or calf / thigh tenderness bilaterally. No cyanosis. No cool or pale-appearing limb. Distal cap refill and pulses intact bilateral upper and lower extremities  Bilateral upper and lower extremity ROM intact without pain or obvious deficit  Integument:  Warm, Dry  Neurologic: Alert & oriented , No focal deficits noted. Cranial nerves II through XII grossly intact. Normal gross motor coordination & motor strength bilateral upper and lower extremities  Sensation intact.   Psychiatric:  Affect normal, Mood normal.       Labs:  Results for orders placed or performed during the hospital encounter of 04/24/20   CBC auto diff   Result Value Ref Range    WBC 12.7 (H) 4.0 - 10.5 K/CU MM    RBC 4.59 (L) 4.6 - 6.2 M/CU MM    Hemoglobin 13.6 13.5 - 18.0 GM/DL Hematocrit 42.3 42 - 52 %    MCV 92.2 78 - 100 FL    MCH 29.6 27 - 31 PG    MCHC 32.2 32.0 - 36.0 %    RDW 16.4 (H) 11.7 - 14.9 %    Platelets 034 879 - 450 K/CU MM    MPV 8.9 7.5 - 11.1 FL    Differential Type AUTOMATED DIFFERENTIAL     Segs Relative 76.8 (H) 36 - 66 %    Lymphocytes % 14.8 (L) 24 - 44 %    Monocytes % 5.8 (H) 0 - 4 %    Eosinophils % 0.4 0 - 3 %    Basophils % 1.3 (H) 0 - 1 %    Segs Absolute 9.7 K/CU MM    Lymphocytes Absolute 1.9 K/CU MM    Monocytes Absolute 0.7 K/CU MM    Eosinophils Absolute 0.1 K/CU MM    Basophils Absolute 0.2 K/CU MM    Nucleated RBC % 0.0 %    Total Nucleated RBC 0.0 K/CU MM    Total Immature Neutrophil 0.12 K/CU MM    Immature Neutrophil % 0.9 (H) 0 - 0.43 %   CMP   Result Value Ref Range    Sodium 139 135 - 145 MMOL/L    Potassium 4.4 3.5 - 5.1 MMOL/L    Chloride 100 99 - 110 mMol/L    CO2 27 21 - 32 MMOL/L    BUN 13 6 - 23 MG/DL    CREATININE 0.7 (L) 0.9 - 1.3 MG/DL    Glucose 224 (H) 70 - 99 MG/DL    Calcium 9.0 8.3 - 10.6 MG/DL    Alb 4.2 3.4 - 5.0 GM/DL    Total Protein 7.3 6.4 - 8.2 GM/DL    Total Bilirubin 1.1 (H) 0.0 - 1.0 MG/DL    ALT 17 10 - 40 U/L    AST 18 15 - 37 IU/L    Alkaline Phosphatase 87 40 - 129 IU/L    GFR Non-African American >60 >60 mL/min/1.73m2    GFR African American >60 >60 mL/min/1.73m2    Anion Gap 12 4 - 16   Troponin   Result Value Ref Range    Troponin T <0.010 <0.01 NG/ML   Brain Natriuretic Peptide   Result Value Ref Range    Pro-.2 <300 PG/ML   Urinalysis (Lab)   Result Value Ref Range    Color, UA STRAW (A) YELLOW    Clarity, UA CLEAR CLEAR    Glucose, Urine NEGATIVE NEGATIVE MG/DL    Bilirubin Urine NEGATIVE NEGATIVE MG/DL    Ketones, Urine NEGATIVE NEGATIVE MG/DL    Specific Gravity, UA 1.006 1.001 - 1.035    Blood, Urine SMALL (A) NEGATIVE    pH, Urine 5.0 5.0 - 8.0    Protein, UA NEGATIVE NEGATIVE MG/DL    Urobilinogen, Urine NORMAL 0.2 - 1.0 MG/DL    Nitrite Urine, Quantitative NEGATIVE NEGATIVE    Leukocyte Esterase, FINDINGS: BRAIN/VENTRICLES: There is no acute intracranial hemorrhage, mass effect or midline shift. No abnormal extra-axial fluid collection. The gray-white differentiation is maintained without evidence of an acute infarct. Small focus of encephalomalacia to the right cerebellar hemisphere redemonstrated, compatible with old infarct/insult. There is prominence of the ventricles and sulci due to global parenchymal volume loss. There are nonspecific areas of hypoattenuation within the periventricular and subcortical white matter, which likely represent chronic microvascular ischemic change. ORBITS: The visualized portion of the orbits demonstrate no acute abnormality. SINUSES: The visualized paranasal sinuses and mastoid air cells demonstrate no acute abnormality. SOFT TISSUES/SKULL: No acute abnormality of the visualized skull or soft tissues. Stable exam without evidence for acute intracranial abnormality. Xr Chest Portable    Result Date: 4/24/2020  EXAMINATION: ONE XRAY VIEW OF THE CHEST 4/24/2020 12:01 pm COMPARISON: 08/16/2019 HISTORY: ORDERING SYSTEM PROVIDED HISTORY: syncope TECHNOLOGIST PROVIDED HISTORY: Reason for exam:->syncope Reason for Exam: syncope Initial exam FINDINGS: No consolidation, effusion or pneumothorax. Stable cardiomediastinal silhouette. No acute process. Cta Abdomen Pelvis W Contrast    Result Date: 4/24/2020  EXAMINATION: CTA OF THE CHEST; CTA OF THE ABDOMEN AND PELVIS WITH CONTRAST 4/24/2020 1:36 pm TECHNIQUE: CTA of the chest was performed after the administration of intravenous contrast.  Multiplanar reformatted images are provided for review. MIP images are provided for review.  Dose modulation, iterative reconstruction, and/or weight based adjustment of the mA/kV was utilized to reduce the radiation dose to as low as reasonably achievable.; CTA of the abdomen and pelvis was performed with the administration of intravenous contrast. Multiplanar reformatted images are provided for review. MIP images are provided for review. Dose modulation, iterative reconstruction, and/or weight based adjustment of the mA/kV was utilized to reduce the radiation dose to as low as reasonably achievable. COMPARISON: Chest x-rays earlier today and 08/16/2019. CT chest 07/14/2016. No prior abdominal studies. HISTORY: ORDERING SYSTEM PROVIDED HISTORY: syncope, ro card/pulm/aortic process TECHNOLOGIST PROVIDED HISTORY: Reason for exam:->syncope, ro card/pulm/aortic process Reason for Exam: syncope, ro card/pulm/aortic process Acuity: Acute Type of Exam: Initial; ORDERING SYSTEM PROVIDED HISTORY: syncope, ro aortic pathology TECHNOLOGIST PROVIDED HISTORY: Reason for exam:->syncope, ro aortic pathology Reason for Exam: syncope, ro aortic pathology Acuity: Acute Type of Exam: Initial FINDINGS: CTA CHEST: Heart is enlarged. No pericardial effusion. Atherosclerotic changes to the thoracic aorta. No evidence for thoracic aortic aneurysm or dissection. Aorta and great vessels appear patent. Satisfactory opacification of pulmonary arterial system for evaluation for pulmonary embolism. No evidence for pulmonary embolism is identified. No mediastinal or hilar lymphadenopathy. Minimal dependent atelectasis. No focal consolidations, pleural effusions or pulmonary edema. No pneumothoraces. Central airways appear patent. Mild central bronchial wall thickening bilaterally. Likely some small foci of mucoid impaction or aspirated material within some subsegmental bronchi to bilateral lower lobes. No acute or suspicious bone or soft tissue abnormality. Prior median sternotomy. Multilevel degenerative changes to the thoracic spine. CTA ABDOMEN: Atherosclerotic changes to the abdominal aorta. No evidence for abdominal aortic aneurysm or dissection. Aorta and major branch vessels appear patent. Liver, spleen, and pancreas are unremarkable.   Mild bilateral nodular adrenal hyperplasia, left more consolidations, pleural effusions or pulmonary edema. No pneumothoraces. Central airways appear patent. Mild central bronchial wall thickening bilaterally. Likely some small foci of mucoid impaction or aspirated material within some subsegmental bronchi to bilateral lower lobes. No acute or suspicious bone or soft tissue abnormality. Prior median sternotomy. Multilevel degenerative changes to the thoracic spine. CTA ABDOMEN: Atherosclerotic changes to the abdominal aorta. No evidence for abdominal aortic aneurysm or dissection. Aorta and major branch vessels appear patent. Liver, spleen, and pancreas are unremarkable. Mild bilateral nodular adrenal hyperplasia, left more than right. Prior cholecystectomy. Bilateral renal cysts measuring up to 5.2 cm to the right kidney and 3.8 cm to the left kidney. Dominant cyst to the right kidney has attenuation characteristics higher than fluid with Hounsfield units in the mid to upper 20s. No evidence for obstruction or definite bowel wall thickening to un opacified large or small bowel. Colonic diverticulosis without evidence for diverticulitis. Normal appendix. No ascites or focal fluid collections. No intraperitoneal free air. No lymphadenopathy. No acute or suspicious bone or soft tissue abnormality. Multilevel degenerative change to the lumbar spine. Prior surgical intervention with orthopedic hardware to the lumbar spine and lumbosacral junction. CTA PELVIS: Atherosclerotic changes to the iliac and femoral arterial system. No evidence for aneurysm or dissection. Iliac and femoral arterial system appears patent. Urinary bladder is unremarkable. Prostate appears unremarkable. No lymphadenopathy noted. No acute or suspicious bone or soft tissue abnormality. No acute vascular abnormalities. Specifically no evidence for aneurysm or dissection to the thoracic or abdominal aorta or major branch vessels or to the iliac or femoral arterial system. Negative study for pulmonary embolism. Atherosclerosis to include coronary artery disease. Cardiomegaly. Mild central bronchial wall thickening bilaterally which can be seen with bronchitis, asthma and in smokers. Minimal likely mucoid impaction or aspirated material within some subsegmental bronchi to bilateral lower lobes. Bilateral renal cysts. A dominant cyst to the right kidney measuring 5.2 cm has attenuation characteristics higher than fluid. This still may reflect a benign cyst with internal debris or proteinaceous or hemorrhagic material, but cystic neoplasm needs to be excluded. Recommend further evaluation with dedicated renal mass protocol MRI with and without contrast. Colonic diverticulosis without evidence for diverticulitis. ED COURSE & MEDICAL DECISION MAKING     Patient presents as above. Initial vital signs stable. Patient having an unresponsive episode versus syncope prior to arrival.  He denies any preceding symptoms but states he did awake with mild lower abdominal discomfort. No back pain. Has extensive cardiac history, has had valvular heart disease/surgery as well as open heart surgery. No history of syncope. He has been diagnosed with aortic stenosis, there is been findings of mild aortic dilatation on echocardiograms. No history of aneurysm. Denies history of PE. No recent fall or traumas. He states that he feels well even though he is unsure of exactly what happened that prompted the squad to be called. He is not postictal.  He is demonstrating no focal neurologic deficits. Broad work-up initiated including cardiac testing, EKG, cardiac enzyme labs, basic labs, chest x-ray, urinalysis. We obtain a CT of the head as well as a CTA of the pulmonary vessels and the abdomen to evaluate for any aortic pathology. Patient was premedicated with Solu-Medrol, Benadryl, fluids secondary to a Betadine allergy prior to his CT imaging.     EKG did show a first-degree AV block, condition    Patient agrees to return emergency department if symptoms worsen or any new symptoms develop. Vital signs and nursing notes reviewed during ED course. Clinical  IMPRESSION    1. Syncope, unspecified syncope type    2. Episode of unresponsiveness    3. Bilateral renal cysts        Comment: Please note this report has been produced using speech recognition software and may contain errors related to that system including errors in grammar, punctuation, and spelling, as well as words and phrases that may be inappropriate. If there are any questions or concerns please feel free to contact the dictating provider for clarification.           Yasemin Reardon 411, PA  04/25/20 104

## 2020-04-25 ENCOUNTER — CARE COORDINATION (OUTPATIENT)
Dept: CARE COORDINATION | Age: 80
End: 2020-04-25

## 2020-04-25 PROCEDURE — 93010 ELECTROCARDIOGRAM REPORT: CPT | Performed by: INTERNAL MEDICINE

## 2020-04-27 ENCOUNTER — OFFICE VISIT (OUTPATIENT)
Dept: INTERNAL MEDICINE CLINIC | Age: 80
End: 2020-04-27
Payer: MEDICARE

## 2020-04-27 VITALS
OXYGEN SATURATION: 95 % | DIASTOLIC BLOOD PRESSURE: 68 MMHG | HEART RATE: 83 BPM | RESPIRATION RATE: 17 BRPM | WEIGHT: 230 LBS | BODY MASS INDEX: 33 KG/M2 | SYSTOLIC BLOOD PRESSURE: 119 MMHG

## 2020-04-27 PROCEDURE — 1123F ACP DISCUSS/DSCN MKR DOCD: CPT | Performed by: INTERNAL MEDICINE

## 2020-04-27 PROCEDURE — 99214 OFFICE O/P EST MOD 30 MIN: CPT | Performed by: INTERNAL MEDICINE

## 2020-04-27 PROCEDURE — 1036F TOBACCO NON-USER: CPT | Performed by: INTERNAL MEDICINE

## 2020-04-27 PROCEDURE — 4040F PNEUMOC VAC/ADMIN/RCVD: CPT | Performed by: INTERNAL MEDICINE

## 2020-04-27 PROCEDURE — G8417 CALC BMI ABV UP PARAM F/U: HCPCS | Performed by: INTERNAL MEDICINE

## 2020-04-27 PROCEDURE — G8427 DOCREV CUR MEDS BY ELIG CLIN: HCPCS | Performed by: INTERNAL MEDICINE

## 2020-04-28 ENCOUNTER — OFFICE VISIT (OUTPATIENT)
Dept: CARDIOLOGY CLINIC | Age: 80
End: 2020-04-28
Payer: MEDICARE

## 2020-04-28 VITALS
DIASTOLIC BLOOD PRESSURE: 70 MMHG | BODY MASS INDEX: 33.56 KG/M2 | WEIGHT: 234.4 LBS | HEART RATE: 62 BPM | HEIGHT: 70 IN | SYSTOLIC BLOOD PRESSURE: 138 MMHG

## 2020-04-28 PROCEDURE — 99214 OFFICE O/P EST MOD 30 MIN: CPT | Performed by: INTERNAL MEDICINE

## 2020-04-28 PROCEDURE — 1036F TOBACCO NON-USER: CPT | Performed by: INTERNAL MEDICINE

## 2020-04-28 PROCEDURE — G8417 CALC BMI ABV UP PARAM F/U: HCPCS | Performed by: INTERNAL MEDICINE

## 2020-04-28 PROCEDURE — G8427 DOCREV CUR MEDS BY ELIG CLIN: HCPCS | Performed by: INTERNAL MEDICINE

## 2020-04-28 PROCEDURE — 3052F HG A1C>EQUAL 8.0%<EQUAL 9.0%: CPT | Performed by: INTERNAL MEDICINE

## 2020-04-28 PROCEDURE — 1123F ACP DISCUSS/DSCN MKR DOCD: CPT | Performed by: INTERNAL MEDICINE

## 2020-04-28 PROCEDURE — 4040F PNEUMOC VAC/ADMIN/RCVD: CPT | Performed by: INTERNAL MEDICINE

## 2020-04-28 NOTE — PATIENT INSTRUCTIONS
CAD:Yes   clinically stable. Patient is on optimal medical regimen ( see medication list above )  -     CORONARY ARTERY DISEASE: Patient is currently  asymptomatic from CAD. - changes in  treatment:   no           - Testing ordered:  no  [facility] classification: 1  [place] RISK SCORE:  [place] RISK SCORE:  HTN:well controlled on current medical regimen, see list above. - changes in  treatment:   no   CARDIOMYOPATHY: None known   CONGESTIVE HEART FAILURE: NO KNOWN HISTORY.    VHD: S/P AVR  DYSLIPIDEMIA: Patient's profile is at / near [employer],                                                              Tolerating current medical regimen wellyes,                                                              See most recent Lab values in Labs section above. Diabetes mellitis: .yes,                                      Managed by family MD                                     BS under good control No                                     Hgb A1c avilable yes,   PAD: None known. US shows triphasic wave form  OTHER RELEVANT DIAGNOSIS:as noted in patient's active problem list:  Syncope: sounds like vaso vagal. Patient's Cardiac testing has been > 2 years will check Cardiac testing  TESTS ORDERED: Echo, Carotid US & Lexiscan Cardiolite                                           All previously ordered tests reviewed. ARRHYTHMIAS: None  known   MEDICATIONS: CPM. Wear stockings   Office f/u for test results. Primary/secondary prevention is the goal by aggressive risk modification, healthy and therapeutic life style changes for cardiovascular risk reduction. Various goals are discussed and multiple questions answered.

## 2020-04-28 NOTE — PROGRESS NOTES
(INTO THE LUNGS) EVERY 6 HOURS AS NEEDED FOR WHEEZING 18 g 2    carvedilol (COREG) 25 MG tablet Take 1 tablet by mouth 2 times daily 180 tablet 3    furosemide (LASIX) 40 MG tablet Take 1 tablet by mouth daily 90 tablet 3    diltiazem (CARDIZEM CD) 120 MG extended release capsule Take 1 capsule by mouth daily 90 capsule 3    atorvastatin (LIPITOR) 40 MG tablet TAKE ONE TABLET BY MOUTH DAILY 90 tablet 3    fluticasone (FLONASE) 50 MCG/ACT nasal spray 2 sprays by Each Nostril route daily 1 Bottle 5    Coenzyme Q10 (COQ10) 400 MG CAPS Take by mouth every morning Over The Counter      Magnesium 500 MG CAPS Take by mouth every evening Over The Counter      aspirin 81 MG EC tablet Take 81 mg by mouth nightly Over The Counter       No current facility-administered medications for this visit. Allergies: Betadine [povidone iodine] and Morphine  Past Medical History:   Diagnosis Date    Acid reflux     Aortic stenosis 1/2015, 4/2014 [facility]IS    1/2015 Echo EF 55% Mod AS,   4/14 TTE mod-severe AS, EF 55%; 4/13 TTE mod aortic stenosis, mild MR and TR, EF >55%    Aortic valve stenosis     Arthritis     Hands, Back     CAD (coronary artery disease)     Sees Dr. Alexa Guillen CHF (congestive heart failure) ([facility])     3/7 TTE EF 55-60%, mild AS; 8/5 stress large inferoseptal MI, no ischemia, EF 30%    Cholelithiasis     COPD [facility] obstructive [facility] disease) [facility] [address]     Diabetes type 2, controlled ([facility] [address] Dx 2000    Enlarged prostate     Fuchs' corneal dystrophy Dx 2000    Bilateral Eyes    Full dentures     H/O cardiovascular stress test 1/22/2015    cardiolite-normal, no ischemia, EF58%    H/O Doppler lower arterial ultrasound 09/17/2019    No significant evidence of large vessel arterial occlusive disease of the lower extremities, Bilateral ABIs couldn't be obtained d/t non compressible arteries.     H/O echocardiogram 1/22/15, 4/17/14, 4/5/13 1/2015, EF 55% Mod AS,  2014,EF 55%,Mod-Severe aortic stenosis, Trace of  Aortic Insufficiency, Mild MR & TR    [place] (hard of hearing)     Bilateral Ears    HTN (hypertension)     Hx of Doppler echocardiogram 10/15/2018    Left ventricular systolic function is normal with an ejection fraction of 50-55%. Mild concentric left ventricular hypertrophy. The left atrium is mildly dilated. Mild dilatation of the aortic root. 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 No other significant valvulopathy seen. No evidence of pericardial effusion. 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 Fang - L2-S1 laminectomy and forototomy with L4-S1 fusion and fixation    CARDIAC CATHETERIZATION  06/21/2016   [facility] CARDIAC SURGERY  07/12/2016    Aortiv valve repair # 22 medtronic tissue valve, CABG x3 SVG to circ, SVG to PDA and LIMA to LAD    CHOLECYSTECTOMY, LAPAROSCOPIC  2/10    Dr. Oralia Lowe      All Teeth Extracted In Past    ENDOSCOPY, COLON, DIAGNOSTIC  09/2013   [facility] RECTAL SURGERY  1980's    Benign Rectal Cyst Removed    THORACENTESIS Right 07/14/2016    Dr Brianna Owens  1940's      As reviewed   Family History   Problem Relation Age of Onset    Stroke Father     Heart Disease Father         \"Aortic Valve, 4 Bypasses\"    Alzheimer's Disease Mother     Stroke Mother     Depression Mother     Cancer Brother         Bladder Cancer    Heart Disease Brother 14 04/03/2020    PROT 7.3 04/24/2020    PROT 7.0 04/03/2020    PROT 7.3 09/25/2012     TSH:    Lab Results   Component Value Date    TSH 1.56 09/02/2016       QUALITY MEASURES REVIEWED:  1.CAD:Patient is taking anti platelet agent:Yes  2. DYSLIPIDEMIA: Patient is on cholesterol lowering medication:Yes  3. Beta-Blocker therapy for CAD, if prior Myocardial Infarction:Yes  4. Atrial fibrillation & anticoagulation therapy No  5. Discussed weight management strategies. Impression:    1. Coronary artery disease involving native coronary artery of native heart without angina pectoris    2. Plantar ulcer, right, with fat layer exposed ([address]    3. Essential hypertension    4. Type 2 diabetes, controlled, with neuropathy ([address]    5. Foraminal stenosis of cervical region    6. Hypercholesteremia    7. VHD (valvular heart disease)    8. S/P CABG x 3    9. S/P AVR (aortic valve replacement)    10.  Varicose veins of both legs with edema       Patient Active Problem List   Diagnosis Code    HTN (hypertension) I10    Fuchs' corneal dystrophy H18.51    PUD (peptic ulcer disease) K27.9    Lumbar radiculopathy M54.16    Benign prostatic hyperplasia N40.0    Slow transit constipation K59.01    Foraminal stenosis of cervical region M48.02    Type 2 diabetes, controlled, with neuropathy ([address] E11.40    Hypercholesteremia E78.00    VHD (valvular heart disease) I38    Coronary artery disease involving native coronary artery of native heart without angina pectoris I25.10    S/P cardiac cath Z98.890    CAD (coronary artery disease) I25.10    S/P CABG x 3 Z95.1    S/P AVR (aortic valve replacement) Z95.2    Anemia D64.9    COPD (chronic obstructive pulmonary disease) ([facility] J44.9    Varicose veins of both legs with edema I83.893    WD-Diabetic ulcer of toe of right foot associated with type 2 diabetes mellitus, with fat layer exposed ([address] E11.621, L97.512    Plantar ulcer, right, with fat layer exposed ([address] G13.794

## 2020-05-04 ENCOUNTER — CARE COORDINATION (OUTPATIENT)
Dept: CARE COORDINATION | Age: 80
End: 2020-05-04

## 2020-05-04 NOTE — CARE COORDINATION
You Patient resolved from the Care Transitions episode on 5.4.20   Patient/family has been provided the following resources and education related to COVID-19:                         Signs, symptoms and red flags related to COVID-19            CDC exposure and quarantine guidelines            [facility]it exposure contact - [phone]            Contact for their local Department of Health                 Patient currently reports that the following symptoms have improved:  syncope, ABD pain, emesis. Pt wife reports that he is doing much better. He has f/u with his cardiologist, and he has no more abd pain or emesis. He is not showing any s/s of respiratory issues. and no new/worsening symptoms     No further outreach scheduled with this CTN/ACM. Episode of Care resolved. Patient has this CTN/ACM contact information if future needs arise.

## 2020-05-05 LAB
EKG ATRIAL RATE: 75 BPM
EKG DIAGNOSIS: NORMAL
EKG P AXIS: 65 DEGREES
EKG P-R INTERVAL: 264 MS
EKG Q-T INTERVAL: 414 MS
EKG QRS DURATION: 98 MS
EKG QTC CALCULATION (BAZETT): 462 MS
EKG R AXIS: -10 DEGREES
EKG T AXIS: 62 DEGREES
EKG VENTRICULAR RATE: 75 BPM

## 2020-05-06 ENCOUNTER — PROCEDURE VISIT (OUTPATIENT)
Dept: CARDIOLOGY CLINIC | Age: 80
End: 2020-05-06
Payer: MEDICARE

## 2020-05-06 LAB
LV EF: 65 %
LVEF MODALITY: NORMAL

## 2020-05-06 PROCEDURE — A9500 TC99M SESTAMIBI: HCPCS | Performed by: INTERNAL MEDICINE

## 2020-05-06 PROCEDURE — 93016 CV STRESS TEST SUPVJ ONLY: CPT | Performed by: INTERNAL MEDICINE

## 2020-05-06 PROCEDURE — 93018 CV STRESS TEST I&R ONLY: CPT | Performed by: INTERNAL MEDICINE

## 2020-05-06 PROCEDURE — 93017 CV STRESS TEST TRACING ONLY: CPT | Performed by: INTERNAL MEDICINE

## 2020-05-06 PROCEDURE — 78452 HT MUSCLE IMAGE SPECT MULT: CPT | Performed by: INTERNAL MEDICINE

## 2020-05-07 ENCOUNTER — HOSPITAL ENCOUNTER (OUTPATIENT)
Dept: MRI IMAGING | Age: 80
Discharge: HOME | End: 2020-05-07
Payer: MEDICARE

## 2020-05-07 PROCEDURE — A9579 GAD-BASE MR CONTRAST NOS,1ML: HCPCS | Performed by: INTERNAL MEDICINE

## 2020-05-07 PROCEDURE — 6360000004 HC RX CONTRAST MEDICATION: Performed by: INTERNAL MEDICINE

## 2020-05-07 PROCEDURE — 74183 MRI ABD W/O CNTR FLWD CNTR: CPT

## 2020-05-07 RX ADMIN — GADOTERIDOL 20 ML: 279.3 INJECTION, SOLUTION INTRAVENOUS at 09:40

## 2020-05-08 ENCOUNTER — TELEPHONE (OUTPATIENT)
Dept: CARDIOLOGY CLINIC | Age: 80
End: 2020-05-08

## 2020-05-13 RX ORDER — ATORVASTATIN CALCIUM 40 MG/1
40 TABLET, FILM COATED ORAL DAILY
Qty: 90 TABLET | Refills: 3 | Status: SHIPPED | OUTPATIENT
Start: 2020-05-13 | End: 2021-03-19 | Stop reason: SDUPTHER

## 2020-05-18 ENCOUNTER — TELEPHONE (OUTPATIENT)
Dept: CARDIOLOGY CLINIC | Age: 80
End: 2020-05-18

## 2020-06-11 RX ORDER — GLIPIZIDE 5 MG/1
2.5 TABLET ORAL DAILY
Qty: 45 TABLET | Refills: 3 | Status: SHIPPED | OUTPATIENT
Start: 2020-06-11 | End: 2020-07-30

## 2020-06-15 RX ORDER — DILTIAZEM HYDROCHLORIDE 120 MG/1
120 CAPSULE, COATED, EXTENDED RELEASE ORAL DAILY
Qty: 90 CAPSULE | Refills: 3 | Status: SHIPPED | OUTPATIENT
Start: 2020-06-15 | End: 2021-03-19 | Stop reason: SDUPTHER

## 2020-06-22 ENCOUNTER — OFFICE VISIT (OUTPATIENT)
Dept: INTERNAL MEDICINE CLINIC | Age: 80
End: 2020-06-22
Payer: MEDICARE

## 2020-06-22 VITALS
HEART RATE: 78 BPM | WEIGHT: 232.4 LBS | RESPIRATION RATE: 16 BRPM | DIASTOLIC BLOOD PRESSURE: 64 MMHG | SYSTOLIC BLOOD PRESSURE: 120 MMHG | BODY MASS INDEX: 33.35 KG/M2 | OXYGEN SATURATION: 94 %

## 2020-06-22 PROBLEM — R41.3 MEMORY DEFICIT: Status: ACTIVE | Noted: 2020-06-22

## 2020-06-22 PROBLEM — K21.9 GASTROESOPHAGEAL REFLUX DISEASE WITHOUT ESOPHAGITIS: Status: ACTIVE | Noted: 2020-06-22

## 2020-06-22 PROCEDURE — 1036F TOBACCO NON-USER: CPT | Performed by: INTERNAL MEDICINE

## 2020-06-22 PROCEDURE — 1123F ACP DISCUSS/DSCN MKR DOCD: CPT | Performed by: INTERNAL MEDICINE

## 2020-06-22 PROCEDURE — G8417 CALC BMI ABV UP PARAM F/U: HCPCS | Performed by: INTERNAL MEDICINE

## 2020-06-22 PROCEDURE — 99213 OFFICE O/P EST LOW 20 MIN: CPT | Performed by: INTERNAL MEDICINE

## 2020-06-22 PROCEDURE — 4040F PNEUMOC VAC/ADMIN/RCVD: CPT | Performed by: INTERNAL MEDICINE

## 2020-06-22 PROCEDURE — G8427 DOCREV CUR MEDS BY ELIG CLIN: HCPCS | Performed by: INTERNAL MEDICINE

## 2020-06-22 RX ORDER — OMEPRAZOLE 20 MG/1
20 CAPSULE, DELAYED RELEASE ORAL
Qty: 30 CAPSULE | Refills: 5 | Status: SHIPPED | OUTPATIENT
Start: 2020-06-22 | End: 2020-08-17

## 2020-06-22 NOTE — PROGRESS NOTES
Remigio Pereira  1940 06/22/20    SUBJECTIVE:    Pt has had acid reflux which is causing symptoms when he is lying flat. He denies any blood in the stool, at times has back stools. Ranitidine worked well for the symptoms. Memory is slowly worsening. He did not have much benefit with namenda or aricept. He is not getting much exercise. He is not too steady with his ambulation, but he has had no falls. He denies any syncope/pre-syncope    OBJECTIVE:    /64   Pulse 78   Resp 16   Wt 232 lb 6.4 oz (105.4 kg)   SpO2 94%   BMI 33.35 kg/m²     Physical Exam  Constitutional:       Appearance: He is well-developed. Eyes:      General: No scleral icterus. Conjunctiva/sclera: Conjunctivae normal.   Cardiovascular:      Rate and Rhythm: Normal rate and regular rhythm. Heart sounds: Murmur present. Systolic murmur present with a grade of 2/6. Pulmonary:      Effort: Pulmonary effort is normal. No respiratory distress. Breath sounds: Normal breath sounds. No wheezing. ASSESSMENT:    1. Gastroesophageal reflux disease without esophagitis    2. Memory deficit        PLAN:    Maury Vasquez was seen today for 3 month follow-up and gastroesophageal reflux. Diagnoses and all orders for this visit:    Gastroesophageal reflux disease without esophagitis - stat pt on omeprazole    Memory deficit - no benefit with meds; encourage exercise    Encourage PT to help with ambulation, pt declines for now    Other orders  -     omeprazole (PRILOSEC) 20 MG delayed release capsule;  Take 1 capsule by mouth every morning (before breakfast)

## 2020-07-27 ENCOUNTER — TELEPHONE (OUTPATIENT)
Dept: CARDIOLOGY CLINIC | Age: 80
End: 2020-07-27

## 2020-07-27 NOTE — TELEPHONE ENCOUNTER
I reviewed the account and billing in [address] [employer] has done an appeal and waiting for a response. I called the pt back, talked to his wife and explained they are not being charged for it. She understood.

## 2020-07-30 ENCOUNTER — HOSPITAL ENCOUNTER (OUTPATIENT)
Dept: WOUND CARE | Age: 80
Discharge: HOME | End: 2020-07-30
Payer: MEDICARE

## 2020-07-30 VITALS
SYSTOLIC BLOOD PRESSURE: 134 MMHG | TEMPERATURE: 98.8 F | DIASTOLIC BLOOD PRESSURE: 68 MMHG | HEART RATE: 73 BPM | RESPIRATION RATE: 16 BRPM

## 2020-07-30 PROBLEM — L89.159: Status: ACTIVE | Noted: 2020-07-30

## 2020-07-30 PROBLEM — E11.622 TYPE II DIABETES MELLITUS WITH ULCER (HCC): Status: ACTIVE | Noted: 2020-07-30

## 2020-07-30 PROBLEM — L98.499 TYPE II DIABETES MELLITUS WITH ULCER (HCC): Status: ACTIVE | Noted: 2020-07-30

## 2020-07-30 PROCEDURE — 87070 CULTURE OTHR SPECIMN AEROBIC: CPT

## 2020-07-30 PROCEDURE — 87077 CULTURE AEROBIC IDENTIFY: CPT

## 2020-07-30 PROCEDURE — 87186 SC STD MICRODIL/AGAR DIL: CPT

## 2020-07-30 PROCEDURE — 99213 OFFICE O/P EST LOW 20 MIN: CPT

## 2020-07-30 PROCEDURE — 87075 CULTR BACTERIA EXCEPT BLOOD: CPT

## 2020-07-30 PROCEDURE — 11042 DBRDMT SUBQ TIS 1ST 20SQCM/<: CPT

## 2020-07-30 RX ORDER — GLIPIZIDE 5 MG/1
5 TABLET ORAL
COMMUNITY
End: 2020-08-17 | Stop reason: SDUPTHER

## 2020-07-30 NOTE — PROGRESS NOTES
[facility] Initial Visit      Peyton Goodson  AGE: [de-identified] y.o. GENDER: male  : 1940  EPISODE DATE:  2020   Referred by:Kacy Weaver    Subjective:     CHIEF COMPLAINT ulcer coccyx     HISTORY of PRESENT ILLNESS      Peyton Goodson is a [de-identified] y.o. 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 The condition is of marked severity. The ulcer has been present for 1year . The underlying cause is thought to be diabetes and pressure. The patients care to date has included medi-honey. The patient has significant underlying medical conditions as below. Wound Pain Timing/Severity: none  Quality of pain: N/A  Severity of pain:  0 / 10   Modifying Factors: diabetes and chronic pressure  Associated Signs/Symptoms: none        PAST MEDICAL HISTORY        Diagnosis Date    Acid reflux     Aortic stenosis 2015, 2014 OTIS    2015 Echo EF 55% Mod AS,    TTE mod-severe AS, EF 55%;  TTE mod aortic stenosis, mild MR and TR, EF >55%    Aortic valve stenosis     Arthritis     Hands, Back     CAD (coronary artery disease)     Sees Dr. Winston Singleton CHF (congestive heart failure) ([facility])     3/7 TTE EF 55-60%, mild AS;  stress large inferoseptal MI, no ischemia, EF 30%    Cholelithiasis     [facility] obstructive [facility]) [facility]     Diabetes type 2, controlled ([facility] Dx     Enlarged prostate     Fuchs' corneal dystrophy Dx     Bilateral Eyes    Full dentures     H/O cardiovascular stress test 2015    cardiolite-normal, no ischemia, EF58%    H/O Doppler lower arterial ultrasound 2019    No significant evidence of large vessel arterial occlusive disease of the lower extremities, Bilateral ABIs couldn't be obtained d/t non compressible arteries.     H/O echocardiogram 1/22/15, 14, 13, EF 55% Mod AS,  ,EF 55%,Mod-Severe aortic 2    fluticasone (FLONASE) 50 MCG/ACT nasal spray 2 sprays by Each Nostril route daily 1 Bottle 5     No current facility-administered medications on file prior to encounter. PROBLEM LIST    Patient Active Problem List   Diagnosis    HTN (hypertension)    Fuchs' corneal dystrophy    PUD (peptic ulcer disease)    Lumbar radiculopathy    Benign prostatic hyperplasia    Slow transit constipation    Foraminal stenosis of cervical region    Type 2 diabetes, controlled, with neuropathy ([address]    Hypercholesteremia    VHD (valvular heart disease)    Coronary artery disease involving native coronary artery of native heart without angina pectoris    S/P cardiac cath    CAD (coronary artery disease)    S/P CABG x 3    S/P AVR (aortic valve replacement)    Anemia    COPD (chronic obstructive pulmonary disease) (HCC)    Varicose veins of both legs with edema    WD-Diabetic ulcer of toe of right foot associated with type 2 diabetes mellitus, with fat layer exposed ([address]    Plantar ulcer, right, with fat layer exposed ([address]    Gastroesophageal reflux disease without esophagitis    Memory deficit    WD-Pressure ulcer of coccygeal region, unspecified pressure ulcer stage    WD-Type II diabetes mellitus with ulcer ([address]       REVIEW OF SYSTEMS          Objective:      /68   Pulse 73   Temp 98.8 °F (37.1 °C)   Resp 16     PHYSICAL EXAM  Constitutional: Negative for systemic symptoms including fever, chills and malaise. Dermatologic exam: Visual inspection of the periwound reveals the skin to be normal in turgor and texture  Wound exam: see wound description below in procedure note      Assessment:       Oswaldo Rueda  appears to have a non-healing wound of the coccyx. The etiology of the wound is felt to be diabetic and pressure. There are multiple complicating factors including diabetes and chronic pressure. A comprehensive wound management program would be helpful to heal this wound. Assessments completed include fall risk and nutritional, functional,and psychological status. At this time appropriate care would include: periodic debridement and wound care as below. Problem List Items Addressed This Visit     WD-Pressure ulcer of coccygeal region, unspecified pressure ulcer stage    WD-Type II diabetes mellitus with ulcer (HCC)    Relevant Medications    glipiZIDE (GLUCOTROL) 5 MG tablet            Procedure Note    Indications:  Based on my examination of this patient's wound(s) today, sharp excision into necrotic subcutaneous tissue is required to promote healing and evaluate the extent of previous healing. Performed by: Massimo Serra MD    Consent obtained: Yes    Time out taken:  Yes    Pain Control: none needed       Debridement:Excisional Debridement    Using curette the wound(s) was/were sharply debrided down through and including the removal of subcutaneous tissue. Devitalized Tissue Debrided:  biofilm, slough and necrotic/eschar    Pre Debridement Measurements:  Are located in the Wound Documentation Flow Sheet    All active wounds listed below with today's date are evaluated  Wound(s)    debrided this date include # : 1    Post  Debridement Measurements:  Incision 07/12/16 Chest Mid (Active)   Number of days: 1479       Incision 07/12/16 Leg (Active)   Number of days: 5649       Wound 07/30/20 Coccyx #1 Coccyx (Active)   Wound Image   07/30/20 1115   Wound Other 07/30/20 1115   Dressing Status Clean;Dry; Intact 07/30/20 1128   Dressing Changed Changed/New 07/30/20 1128   Wound Cleansed Rinsed/Irrigated with saline 07/30/20 1115   Wound Length (cm) 3 cm 07/30/20 1115   Wound Width (cm) 0.5 cm 07/30/20 1115   Wound Depth (cm) 1.5 cm 07/30/20 1115   Wound Surface Area (cm^2) 1.5 cm^2 07/30/20 1115   Wound Volume (cm^3) 2.25 cm^3 07/30/20 1115   Post-Procedure Length (cm) 3 cm 07/30/20 1127   Post-Procedure Width (cm) 0.5 cm 07/30/20 1127   Post-Procedure Depth (cm) 1.5 cm 07/30/20 1127   Post-Procedure Surface Area (cm^2) 1.5 cm^2 07/30/20 1127   Post-Procedure Volume (cm^3) 2.25 cm^3 07/30/20 1127   Distance Tunneling (cm) 0 cm 07/30/20 1115   Tunneling Position ___ O'Clock 0 07/30/20 1115   Undermining Starts ___ O'Clock 1200 07/30/20 1115   Undermining Ends___ O'Clock 1200 07/30/20 1115   Undermining Maxium Distance (cm) 1.0 07/30/20 1115   Wound Assessment Red 07/30/20 1115   Drainage Amount Moderate 07/30/20 1115   Drainage Description Yellow 07/30/20 1115   Odor None 07/30/20 1115   Margins Defined edges; Unattached edges 07/30/20 1115   Jewels-wound Assessment Pink 07/30/20 1115   Non-staged Wound Description Full thickness 07/30/20 1115   [place]%Wound Bed 0 07/30/20 1115   Red%Wound Bed 100 07/30/20 1115   Yellow%Wound Bed 0 07/30/20 1115   Black%Wound Bed 0 07/30/20 1115   Purple%Wound Bed 0 07/30/20 1115   Other%Wound Bed 0 07/30/20 1115   Number of days: 0       Percent of Wound(s) Debrided: 100%    Total  Area  Debrided:  1.5 sq cm     Bleeding:  Minimal    Hemostasis Achieved:  by pressure    Procedural Pain:  2  / 10     Post Procedural Pain:  0 / 10     Response to treatment:  Well tolerated by patient. Plan:     Discharge Instructions       PHYSICIAN ORDERS AND DISCHARGE INSTRUCTIONS    NOTE: Upon discharge from the [address], you will receive a patient experience survey. We would be grateful if you would take the time to fill this survey out. Wound care order history:     SABAS's   Right       Left    Date:   Cultures:     Grafts:     Antibiotics:        Continuing wound care orders and information:                Residence:  Home              Continue home health care with:    Your wound-care supplies will be provided by: Wound cleansing:     Do not scrub or use excessive force. Wash hands with soap and water before and after dressing changes. Prior to applying a clean dressing, cleanse wound with normal saline, wound cleanser, or mild soap and water. Ask the physician or nurse before getting the wound(s) wet in a shower    Daily Wound management:   Keep weight off wounds and reposition every 2 hours. Avoid standing for long periods of time. Apply wraps/stockings in AM and remove at bedtime. If swelling is present, elevate legs to the level of the heart or above for 30 minutes 4-5 times a day and/or when sitting. When taking antibiotics take entire prescription as ordered by physician do not stop taking until medicine is all gone. Orders for this week: 07/30/20        Buttock wound-- Clean with soap and water pat dry. Pack wound with Betadine moist gauze. Cover with Border Gauze. Change Daily    Follow up with Dr Danyell Molina  In 1  weeks in the wound care center  Call [phone] for any questions or concerns.   Date__________   Time____________        Treatment Note Wound 07/30/20 Coccyx #1 Coccyx-Dressing/Treatment: (Betadine Gauze, Border Gauze)    Written Patient Dismissal Instructions Given          Electronically signed by Arleen Madrigal MD on 7/30/2020 at 11:44 AM

## 2020-08-03 RX ORDER — CARVEDILOL 25 MG/1
25 TABLET ORAL 2 TIMES DAILY
Qty: 180 TABLET | Refills: 3 | Status: SHIPPED | OUTPATIENT
Start: 2020-08-03 | End: 2021-03-19 | Stop reason: SDUPTHER

## 2020-08-05 LAB
CULTURE: ABNORMAL
CULTURE: ABNORMAL
Lab: ABNORMAL
SPECIMEN: ABNORMAL

## 2020-08-13 ENCOUNTER — HOSPITAL ENCOUNTER (OUTPATIENT)
Dept: WOUND CARE | Age: 80
Discharge: HOME | End: 2020-08-13
Payer: MEDICARE

## 2020-08-13 VITALS
SYSTOLIC BLOOD PRESSURE: 152 MMHG | HEART RATE: 76 BPM | DIASTOLIC BLOOD PRESSURE: 70 MMHG | TEMPERATURE: 98.7 F | RESPIRATION RATE: 18 BRPM

## 2020-08-13 PROBLEM — E11.621 DIABETIC ULCER OF TOE OF RIGHT FOOT ASSOCIATED WITH TYPE 2 DIABETES MELLITUS, WITH FAT LAYER EXPOSED (HCC): Status: RESOLVED | Noted: 2019-08-29 | Resolved: 2020-08-13

## 2020-08-13 PROBLEM — L97.512 DIABETIC ULCER OF TOE OF RIGHT FOOT ASSOCIATED WITH TYPE 2 DIABETES MELLITUS, WITH FAT LAYER EXPOSED (HCC): Status: RESOLVED | Noted: 2019-08-29 | Resolved: 2020-08-13

## 2020-08-13 PROCEDURE — 11042 DBRDMT SUBQ TIS 1ST 20SQCM/<: CPT

## 2020-08-13 RX ORDER — CIPROFLOXACIN 500 MG/1
500 TABLET, FILM COATED ORAL 2 TIMES DAILY
Qty: 14 TABLET | Refills: 0 | Status: SHIPPED | OUTPATIENT
Start: 2020-08-13 | End: 2020-08-17 | Stop reason: ALTCHOICE

## 2020-08-13 NOTE — PROGRESS NOTES
Wound Care Center Progress Note with Procedure Note      Amy Savage  AGE: [de-identified] y.o. GENDER: male  : 1940  EPISODE DATE:  2020     Subjective:     Chief Complaint   Patient presents with    Wound Check     coccyx          HISTORY of PRESENT ILLNESS      Amy Savage is a [de-identified] y.o. 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 The wound is of marked severity. The underlying cause of the wound is diabtes. pressure  Wound Pain Timing/Severity: none  Quality of pain: N/A  Severity of pain:  0 / 10   Modifying Factors: diabetes, chronic pressure and obesity  Associated Signs/Symptoms: none        PAST MEDICAL HISTORY        Diagnosis Date    Acid reflux     Aortic stenosis 2015, 2014 OTIS    2015 Echo EF 55% Mod AS,    TTE mod-severe AS, EF 55%;  TTE mod aortic stenosis, mild MR and TR, EF >55%    Aortic valve stenosis     Arthritis     Hands, Back     CAD (coronary artery disease)     Sees Dr. Margarita Dahl CHF (congestive heart failure) ([facility])     3/7 TTE EF 55-60%, mild AS;  stress large inferoseptal MI, no ischemia, EF 30%    Cholelithiasis     COPD [facility] obstructive [facility] disease) ([facility]     Diabetes type 2, controlled ([facility] Dx     Enlarged prostate     Fuchs' corneal dystrophy Dx     Bilateral Eyes    Full dentures     H/O cardiovascular stress test 2015    cardiolite-normal, no ischemia, EF58%    H/O Doppler lower arterial ultrasound 2019    No significant evidence of large vessel arterial occlusive disease of the lower extremities, Bilateral ABIs couldn't be obtained d/t non compressible arteries.     H/O echocardiogram 1/22/15, 14, 13, EF 55% Mod AS,  ,EF 55%,Mod-Severe aortic stenosis, Trace of  Aortic Insufficiency, Mild MR & TR    [place] (hard of hearing)     Bilateral Ears    HTN (hypertension)     Hx of  Cancer Brother         Bladder Cancer    Heart Disease Brother         Heart Stents    Cancer Sister         Lung Cancer    Diabetes Son        SOCIAL HISTORY    Social History     Tobacco Use    Smoking status: Former Smoker     Packs/day: 2.00     Years: 45.00     Pack years: 90.00     Types: Cigarettes, Pipe     Start date: 1955     Last attempt to quit: 2000     Years since quittin.1    Smokeless tobacco: Never Used   Substance Use Topics    Alcohol use:  Yes     Alcohol/week: 14.0 standard drinks     Types: 14 Standard drinks or equivalent per week     Comment: rarely    Drug use: No       ALLERGIES    Allergies   Allergen Reactions    Betadine [Povidone Iodine] Itching and Rash     Tolerated IV dye with pretreatment    Morphine        MEDICATIONS    Current Outpatient Medications on File Prior to Encounter   Medication Sig Dispense Refill    carvedilol (COREG) 25 MG tablet Take 1 tablet by mouth 2 times daily 180 tablet 3    glipiZIDE (GLUCOTROL) 5 MG tablet Take 5 mg by mouth 2 times daily (before meals)      omeprazole (PRILOSEC) 20 MG delayed release capsule Take 1 capsule by mouth every morning (before breakfast) 30 capsule 5    dilTIAZem (CARDIZEM CD) 120 MG extended release capsule Take 1 capsule by mouth daily 90 capsule 3    atorvastatin (LIPITOR) 40 MG tablet Take 1 tablet by mouth daily 90 tablet 3    albuterol sulfate  (90 Base) MCG/ACT inhaler INHALE TWO PUFFS BY MOUTH (INTO THE LUNGS) EVERY 6 HOURS AS NEEDED FOR WHEEZING 18 g 2    furosemide (LASIX) 40 MG tablet Take 1 tablet by mouth daily 90 tablet 3    fluticasone (FLONASE) 50 MCG/ACT nasal spray 2 sprays by Each Nostril route daily 1 Bottle 5    Coenzyme Q10 (COQ10) 400 MG CAPS Take by mouth every morning Over The Counter      Magnesium 500 MG CAPS Take by mouth every evening Over The Counter      aspirin 81 MG EC tablet Take 81 mg by mouth nightly Over The Counter       No current facility-administered medications on file prior to encounter. REVIEW OF SYSTEMS      Constitutional: Negative for systemic symptoms including fever, chills and malaise. Objective:      BP (!) 152/70   Pulse 76   Temp 98.7 °F (37.1 °C)   Resp 18     PHYSICAL EXAM  Constitutional: Negative for systemic symptoms including fever, chills and malaise. General: The patient is in no acute distress. Mental status:  Patient is appropriate, is  oriented to place and plan of care. Dermatologic exam: Visual inspection of the periwound reveals the skin to be normal in turgor and texture  Wound exam: see wound description below in procedure note      Assessment:     Problem List Items Addressed This Visit     WD-Pressure ulcer of coccygeal region, unspecified pressure ulcer stage    WD-Type II diabetes mellitus with ulcer ([facility] - Primary        Procedure Note    Indications:  Based on my examination of this patient's wound(s) today, sharp excision into necrotic subcutaneous tissue is required to promote healing and evaluate the extent of previous healing. Performed by: Surya Gayle MD    Consent obtained: Yes    Time out taken:  Yes    Pain Control: 4% lidocaine sol. Debridement:Excisional Debridement    Using curette the wound(s) was/were sharply debrided down through and including the removal of subcutaneous tissue. Devitalized Tissue Debrided:  biofilm and necrotic/eschar    Pre Debridement Measurements:  Are located in the Wound Documentation Flow Sheet    All active wounds listed below with today's date are evaluated  Wound(s)    debrided this date include # : 1     Post  Debridement Measurements:  Incision 07/12/16 Chest Mid (Active)   Number of days: 6774       Incision 07/12/16 Leg (Active)   Number of days: 2193       Wound 07/30/20 Coccyx #1 Coccyx (Active)   Wound Image   07/30/20 1115   Wound Other 08/13/20 1035   Dressing Status Clean;Dry; Intact 07/30/20 1128   Dressing Changed Changed/New 07/30/20 1128   Wound Cleansed Rinsed/Irrigated with saline 08/13/20 1035   Wound Length (cm) 2.2 cm 08/13/20 1035   Wound Width (cm) 0.5 cm 08/13/20 1035   Wound Depth (cm) 1 cm 08/13/20 1035   Wound Surface Area (cm^2) 1.1 cm^2 08/13/20 1035   Change in Wound Size % (l*w) 26.67 08/13/20 1035   Wound Volume (cm^3) 1.1 cm^3 08/13/20 1035   Wound Healing % 51 08/13/20 1035   Post-Procedure Length (cm) 2.2 cm 08/13/20 1042   Post-Procedure Width (cm) 0.5 cm 08/13/20 1042   Post-Procedure Depth (cm) 1 cm 08/13/20 1042   Post-Procedure Surface Area (cm^2) 1.1 cm^2 08/13/20 1042   Post-Procedure Volume (cm^3) 1.1 cm^3 08/13/20 1042   Distance Tunneling (cm) 0 cm 08/13/20 1035   Tunneling Position ___ O'Clock 0 08/13/20 1035   Undermining Starts ___ O'Clock 0600 08/13/20 1035   Undermining Ends___ O'Clock 1200 08/13/20 1035   Undermining Maxium Distance (cm) 1 08/13/20 1035   Wound Assessment Red 08/13/20 1035   Drainage Amount Moderate 08/13/20 1035   Drainage Description Yellow 08/13/20 1035   Odor None 08/13/20 1035   Margins Defined edges; Unattached edges 08/13/20 1035   Jewels-wound Assessment Pink 08/13/20 1035   Non-staged Wound Description Full thickness 08/13/20 1035   [place]%Wound Bed 0 08/13/20 1035   Red%Wound Bed 100 08/13/20 1035   Yellow%Wound Bed 0 08/13/20 1035   Black%Wound Bed 0 08/13/20 1035   Purple%Wound Bed 0 08/13/20 1035   Other%Wound Bed 0 08/13/20 1035   Number of days: 13       Percent of Wound(s) Debrided: approximately 100%    Total Surface Area Debrided:  1.1 sq cm     Bleeding:  Minimal    Hemostasis Achieved:  by pressure    Procedural Pain:  0  / 10     Post Procedural Pain:  0 / 10     Response to treatment:  Well tolerated by patient. Wound is has slightly improved    Plan:       Discharge Instructions       PHYSICIAN ORDERS AND DISCHARGE INSTRUCTIONS     NOTE: Upon discharge from the [address], you will receive a patient experience survey.  We would be grateful if you would take the time to fill this survey out.     Wound care order history:                 SABAS's   Right       Left               Date:              Cultures:                Grafts:                Antibiotics:           Continuing wound care orders and information:                 Residence:  Home              Continue home health care with:               Your wound-care supplies will be provided by:      Wound cleansing:               Do not scrub or use excessive force. Wash hands with soap and water before and after dressing changes. Prior to applying a clean dressing, cleanse wound with normal saline, wound cleanser, or mild soap and water. Ask the physician or nurse before getting the wound(s) wet in a shower     Daily Wound management:              Keep weight off wounds and reposition every 2 hours. Avoid standing for long periods of time. Apply wraps/stockings in AM and remove at bedtime. If swelling is present, elevate legs to the level of the heart or above for 30 minutes 4-5 times a day and/or when sitting. When taking antibiotics take entire prescription as ordered by physician do not stop taking until medicine is all gone.                                                           Orders for this week: 08/13/20                   Buttock wound-- Clean with soap and water pat dry. Pack wound with Betadine moist gauze. Cover with Border Gauze. Change Daily     Follow up with Dr Angelika Martínez  In 1  weeks in the wound care center  Call [phone] for any questions or concerns.   Date__________   Time____________        Treatment Note      Written Patient Dismissal Instructions Given            Electronically signed by Ludwin Cantor MD on 8/13/2020 at 10:57 AM

## 2020-08-17 ENCOUNTER — OFFICE VISIT (OUTPATIENT)
Dept: INTERNAL MEDICINE CLINIC | Age: 80
End: 2020-08-17
Payer: MEDICARE

## 2020-08-17 VITALS
WEIGHT: 230.2 LBS | OXYGEN SATURATION: 95 % | HEART RATE: 74 BPM | BODY MASS INDEX: 33.03 KG/M2 | RESPIRATION RATE: 16 BRPM | DIASTOLIC BLOOD PRESSURE: 68 MMHG | SYSTOLIC BLOOD PRESSURE: 136 MMHG

## 2020-08-17 LAB
A/G RATIO: 1.8 (ref 1.1–2.2)
ALBUMIN SERPL-MCNC: 4.5 G/DL (ref 3.4–5)
ALP BLD-CCNC: 81 U/L (ref 40–129)
ALT SERPL-CCNC: 24 U/L (ref 10–40)
ANION GAP SERPL CALCULATED.3IONS-SCNC: 12 MMOL/L (ref 3–16)
AST SERPL-CCNC: 29 U/L (ref 15–37)
BASOPHILS ABSOLUTE: 0.1 K/UL (ref 0–0.2)
BASOPHILS RELATIVE PERCENT: 0.9 %
BILIRUB SERPL-MCNC: 0.8 MG/DL (ref 0–1)
BUN BLDV-MCNC: 12 MG/DL (ref 7–20)
CALCIUM SERPL-MCNC: 9.2 MG/DL (ref 8.3–10.6)
CHLORIDE BLD-SCNC: 98 MMOL/L (ref 99–110)
CHOLESTEROL, TOTAL: 118 MG/DL (ref 0–199)
CO2: 31 MMOL/L (ref 21–32)
CREAT SERPL-MCNC: 0.7 MG/DL (ref 0.8–1.3)
EOSINOPHILS ABSOLUTE: 0.5 K/UL (ref 0–0.6)
EOSINOPHILS RELATIVE PERCENT: 6.9 %
GFR AFRICAN AMERICAN: >60
GFR NON-AFRICAN AMERICAN: >60
GLOBULIN: 2.5 G/DL
GLUCOSE BLD-MCNC: 157 MG/DL (ref 70–99)
HCT VFR BLD CALC: 40.2 % (ref 40.5–52.5)
HDLC SERPL-MCNC: 50 MG/DL (ref 40–60)
HEMOGLOBIN: 13.5 G/DL (ref 13.5–17.5)
LDL CHOLESTEROL CALCULATED: 41 MG/DL
LYMPHOCYTES ABSOLUTE: 2 K/UL (ref 1–5.1)
LYMPHOCYTES RELATIVE PERCENT: 27.4 %
MCH RBC QN AUTO: 30 PG (ref 26–34)
MCHC RBC AUTO-ENTMCNC: 33.4 G/DL (ref 31–36)
MCV RBC AUTO: 89.7 FL (ref 80–100)
MONOCYTES ABSOLUTE: 0.7 K/UL (ref 0–1.3)
MONOCYTES RELATIVE PERCENT: 10 %
NEUTROPHILS ABSOLUTE: 4.1 K/UL (ref 1.7–7.7)
NEUTROPHILS RELATIVE PERCENT: 54.8 %
PDW BLD-RTO: 17.8 % (ref 12.4–15.4)
PLATELET # BLD: 210 K/UL (ref 135–450)
PMV BLD AUTO: 7.6 FL (ref 5–10.5)
POTASSIUM SERPL-SCNC: 4.8 MMOL/L (ref 3.5–5.1)
RBC # BLD: 4.48 M/UL (ref 4.2–5.9)
SODIUM BLD-SCNC: 141 MMOL/L (ref 136–145)
TOTAL PROTEIN: 7 G/DL (ref 6.4–8.2)
TRIGL SERPL-MCNC: 134 MG/DL (ref 0–150)
VLDLC SERPL CALC-MCNC: 27 MG/DL
WBC # BLD: 7.5 K/UL (ref 4–11)

## 2020-08-17 PROCEDURE — 36415 COLL VENOUS BLD VENIPUNCTURE: CPT | Performed by: INTERNAL MEDICINE

## 2020-08-17 PROCEDURE — 4040F PNEUMOC VAC/ADMIN/RCVD: CPT | Performed by: INTERNAL MEDICINE

## 2020-08-17 PROCEDURE — 1123F ACP DISCUSS/DSCN MKR DOCD: CPT | Performed by: INTERNAL MEDICINE

## 2020-08-17 PROCEDURE — 99214 OFFICE O/P EST MOD 30 MIN: CPT | Performed by: INTERNAL MEDICINE

## 2020-08-17 PROCEDURE — 1036F TOBACCO NON-USER: CPT | Performed by: INTERNAL MEDICINE

## 2020-08-17 PROCEDURE — G8417 CALC BMI ABV UP PARAM F/U: HCPCS | Performed by: INTERNAL MEDICINE

## 2020-08-17 PROCEDURE — G8427 DOCREV CUR MEDS BY ELIG CLIN: HCPCS | Performed by: INTERNAL MEDICINE

## 2020-08-17 PROCEDURE — 3052F HG A1C>EQUAL 8.0%<EQUAL 9.0%: CPT | Performed by: INTERNAL MEDICINE

## 2020-08-17 RX ORDER — ESOMEPRAZOLE MAGNESIUM 40 MG/1
40 CAPSULE, DELAYED RELEASE ORAL
Qty: 90 CAPSULE | Refills: 3 | Status: SHIPPED | OUTPATIENT
Start: 2020-08-17 | End: 2021-03-19 | Stop reason: SDUPTHER

## 2020-08-17 RX ORDER — GLIPIZIDE 5 MG/1
5 TABLET ORAL
Qty: 180 TABLET | Refills: 5 | Status: SHIPPED | OUTPATIENT
Start: 2020-08-17 | End: 2021-09-07

## 2020-08-17 NOTE — PROGRESS NOTES
Marina Valdez  1940 08/17/20    SUBJECTIVE:    Pt has been having severe heartburn, reflux, belching despite prilosec. Eating or drinking do not seem to precipitate the symptoms. He has abdominal gas pain. He has been using tums very frequently - this provides short term benefit. He denies blood in the stool but he intermittent has had black stools. Patient compliant with medications for diabetes. Patient has had no episodes of significant hypoglycemia. The patient is taking hypertensive medications compliantly without side effects. Denies chest pain, dyspnea, edema, or TIA's. .     Patient denies any chest pain, shortness of breath, myalgias, Patient is tolerating cholesterol medications without difficulty. OBJECTIVE:    /68   Pulse 74   Resp 16   Wt 230 lb 3.2 oz (104.4 kg)   SpO2 95%   BMI 33.03 kg/m²     Physical Exam  Constitutional:       Appearance: He is well-developed. Eyes:      General: No scleral icterus. Conjunctiva/sclera: Conjunctivae normal.   Neck:      Musculoskeletal: Neck supple. Thyroid: No thyromegaly. Vascular: No JVD. Trachea: No tracheal deviation. Cardiovascular:      Rate and Rhythm: Normal rate and regular rhythm. Heart sounds: Normal heart sounds. Pulmonary:      Effort: Pulmonary effort is normal. No respiratory distress. Breath sounds: Wheezing present. Abdominal:      General: There is no distension. Palpations: Abdomen is soft. There is no mass. Tenderness: There is no abdominal tenderness. There is no guarding or rebound. Lymphadenopathy:      Cervical: No cervical adenopathy. Skin:     Nails: There is no clubbing. Neurological:      Mental Status: He is alert and oriented to person, place, and time. Comments: Nonfocal   Psychiatric:         Behavior: Behavior normal.         Judgment: Judgment normal.     1+ edema to mid calf    ASSESSMENT:    1.  Coronary artery disease involving

## 2020-08-18 LAB
ESTIMATED AVERAGE GLUCOSE: 159.9 MG/DL
HBA1C MFR BLD: 7.2 %

## 2020-08-27 ENCOUNTER — HOSPITAL ENCOUNTER (OUTPATIENT)
Dept: WOUND CARE | Age: 80
Discharge: HOME | End: 2020-08-27
Payer: MEDICARE

## 2020-08-27 VITALS — SYSTOLIC BLOOD PRESSURE: 149 MMHG | DIASTOLIC BLOOD PRESSURE: 68 MMHG | TEMPERATURE: 97.8 F | HEART RATE: 84 BPM

## 2020-08-27 PROCEDURE — 11042 DBRDMT SUBQ TIS 1ST 20SQCM/<: CPT

## 2020-08-27 NOTE — PROGRESS NOTES
Wound Care Center Progress Note with Procedure Note      Katalina Garcia  AGE: [de-identified] y.o. GENDER: male  : 1940  EPISODE DATE:  2020     Subjective:     Chief Complaint   Patient presents with    Wound Check     buttocks          HISTORY of PRESENT ILLNESS      Katalina Garcia is a [de-identified] y.o. 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 The wound is of marked severity. The underlying cause of the wound is diabetes. presure  Wound Pain Timing/Severity: none  Quality of pain: N/A  Severity of pain:  0 / 10   Modifying Factors: diabetes, chronic pressure and obesity  Associated Signs/Symptoms: none        PAST MEDICAL HISTORY        Diagnosis Date    Acid reflux     Aortic stenosis 2015, 2014 OTIS    2015 Echo EF 55% Mod AS,    TTE mod-severe AS, EF 55%;  TTE mod aortic stenosis, mild MR and TR, EF >55%    Aortic valve stenosis     Arthritis     Hands, Back     CAD (coronary artery disease)     Sees Dr. Duran Left CH[facility]congestive heart failure) [facility])     3/7 TTE EF 55-60%, mild AS;  stress large inferoseptal MI, no ischemia, EF 30%    Cholelithiasis     COPD [facility]ronic obstructive pulmonary disease) [facility]     Diabetes type 2, controlled ([facility] Dx     Enlarged prostate     Fuchs' corneal dystrophy Dx     Bilateral Eyes    Full dentures     H/O cardiovascular stress test 2015    cardiolite-normal, no ischemia, EF58%    H/O Doppler lower arterial ultrasound 2019    No significant evidence of large vessel arterial occlusive disease of the lower extremities, Bilateral ABIs couldn't be obtained d/t non compressible arteries.     H/O echocardiogram 1/22/15, 14, 13, EF 55% Mod AS,  ,EF 55%,Mod-Severe aortic stenosis, Trace of  Aortic Insufficiency, Mild MR & TR    [place] (hard of hearing)     Bilateral Ears    HTN (hypertension)     Hx of Doppler echocardiogram 10/15/2018    Left ventricular systolic function is normal with an ejection fraction of 50-55%. Mild concentric left ventricular hypertrophy. The left atrium is mildly dilated. Mild dilatation of the aortic root. 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 No other significant valvulopathy seen. No evidence of pericardial effusion. 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 Fang - L2-S1 laminectomy and forototomy with L4-S1 fusion and fixation    CARDIAC CATHETERIZATION  06/21/2016   [address] [place] [address] CARDIAC SURGERY  07/12/2016    Aortiv valve repair # 22 medtronic tissue valve, CABG x3 SVG to circ, SVG to PDA and LIMA to LAD    CHOLECYSTECTOMY, LAPAROSCOPIC  2/10    Dr. Brant Cantor      All Teeth Extracted In Past    ENDOSCOPY, COLON, DIAGNOSTIC  09/2013   [address] [place] [address] RECTAL SURGERY  1980's    Benign Rectal Cyst Removed    THORACENTESIS Right 07/14/2016    Dr Vicki Zapata- [address] [place] [address]  1940's       FAMILY HISTORY    Family History   Problem Relation Age of Onset    Stroke Father     Heart Disease Father         \"Aortic Valve, 4 Bypasses\"   [address] [place] [address] Alzheimer's Disease Mother     Stroke Mother     Depression Mother  Cancer Brother         Bladder Cancer    Heart Disease Brother         Heart Stents    Cancer Sister         Lung Cancer    Diabetes Son        SOCIAL HISTORY    Social History     Tobacco Use    Smoking status: Former Smoker     Packs/day: 2.00     Years: 45.00     Pack years: 90.00     Types: Cigarettes, Pipe     Start date: 1955     Last attempt to quit: 2000     Years since quittin.1    Smokeless tobacco: Never Used   Substance Use Topics    Alcohol use: Yes     Alcohol/week: 14.0 standard drinks     Types: 14 Standard drinks or equivalent per week     Comment: rarely    Drug use: No       ALLERGIES    Allergies   Allergen Reactions    Morphine        MEDICATIONS    Current Outpatient Medications on File Prior to Encounter   Medication Sig Dispense Refill    glipiZIDE (GLUCOTROL) 5 MG tablet Take 1 tablet by mouth 2 times daily (before meals) 180 tablet 5    esomeprazole (NEXIUM) 40 MG delayed release capsule Take 1 capsule by mouth every morning (before breakfast) 90 capsule 3    carvedilol (COREG) 25 MG tablet Take 1 tablet by mouth 2 times daily 180 tablet 3    dilTIAZem (CARDIZEM CD) 120 MG extended release capsule Take 1 capsule by mouth daily 90 capsule 3    atorvastatin (LIPITOR) 40 MG tablet Take 1 tablet by mouth daily 90 tablet 3    furosemide (LASIX) 40 MG tablet Take 1 tablet by mouth daily 90 tablet 3    fluticasone (FLONASE) 50 MCG/ACT nasal spray 2 sprays by Each Nostril route daily 1 Bottle 5    Coenzyme Q10 (COQ10) 400 MG CAPS Take by mouth every morning Over The Counter      Magnesium 500 MG CAPS Take by mouth every evening Over The Counter      aspirin 81 MG EC tablet Take 81 mg by mouth nightly Over The Counter      albuterol sulfate  (90 Base) MCG/ACT inhaler INHALE TWO PUFFS BY MOUTH (INTO THE LUNGS) EVERY 6 HOURS AS NEEDED FOR WHEEZING 18 g 2     No current facility-administered medications on file prior to encounter.         REVIEW OF SYSTEMS      Constitutional: Negative for systemic symptoms including fever, chills and malaise. Objective:      BP (!) 149/68   Pulse 84   Temp 97.8 °F (36.6 °C) (Temporal)     PHYSICAL EXAM  Constitutional: Negative for systemic symptoms including fever, chills and malaise. General: The patient is in no acute distress. Mental status:  Patient is appropriate, is  oriented to place and plan of care. Dermatologic exam: Visual inspection of the periwound reveals the skin to be normal in turgor and texture  Wound exam: see wound description below in procedure note      Assessment:     Problem List Items Addressed This Visit     WD-Pressure ulcer of coccygeal region, unspecified pressure ulcer stage    WD-Type II diabetes mellitus with ulcer ([facility] - Primary        Procedure Note    Indications:  Based on my examination of this patient's wound(s) today, sharp excision into necrotic subcutaneous tissue is required to promote healing and evaluate the extent of previous healing. Performed by: Ludwin Cantor MD    Consent obtained: Yes    Time out taken:  Yes    Pain Control: 4% lidocaine sol. Debridement:Excisional Debridement    Using curette the wound(s) was/were sharply debrided down through and including the removal of subcutaneous tissue. Devitalized Tissue Debrided:  biofilm and necrotic/eschar    Pre Debridement Measurements:  Are located in the Wound Documentation Flow Sheet    All active wounds listed below with today's date are evaluated  Wound(s)    debrided this date include # : 1     Post  Debridement Measurements:  Incision 07/12/16 Chest Mid (Active)   Number of days: 1507       Incision 07/12/16 Leg (Active)   Number of days: 7117       Wound 07/30/20 Coccyx #1 Coccyx (Active)   Wound Image   07/30/20 1115   Wound Other 08/27/20 1029   Dressing Status Clean;Dry; Intact 08/13/20 1105   Dressing Changed Changed/New 08/13/20 1105   Wound Cleansed Rinsed/Irrigated with saline 08/27/20 1029   Wound Length (cm) 3.5 cm 08/27/20 1029   Wound Width (cm) 0.7 cm 08/27/20 1029   Wound Depth (cm) 1 cm 08/27/20 1029   Wound Surface Area (cm^2) 2.45 cm^2 08/27/20 1029   Change in Wound Size % (l*w) -63.33 08/27/20 1029   Wound Volume (cm^3) 2.45 cm^3 08/27/20 1029   Wound Healing % -9 08/27/20 1029   Post-Procedure Length (cm) 3.5 cm 08/27/20 1051   Post-Procedure Width (cm) 0.7 cm 08/27/20 1051   Post-Procedure Depth (cm) 1 cm 08/27/20 1051   Post-Procedure Surface Area (cm^2) 2.45 cm^2 08/27/20 1051   Post-Procedure Volume (cm^3) 2.45 cm^3 08/27/20 1051   Distance Tunneling (cm) 0 cm 08/27/20 1029   Tunneling Position ___ O'Clock 0 08/27/20 1029   Undermining Starts ___ O'Clock 0600 08/27/20 1029   Undermining Ends___ O'Clock 1200 08/27/20 1029   Undermining Maxium Distance (cm) 0.9 08/27/20 1029   Wound Assessment Red 08/27/20 1029   Drainage Amount Moderate 08/27/20 1029   Drainage Description Yellow 08/27/20 1029   Odor None 08/27/20 1029   Margins Defined edges; Unattached edges 08/27/20 1029   [place]-wound Assessment Pink 08/27/20 1029   Non-staged Wound Description Full thickness 08/27/20 1029   [place]%Wound Bed 0 08/27/20 1029   Red%Wound Bed 100 08/27/20 1029   Yellow%Wound Bed 0 08/27/20 1029   Black%Wound Bed 0 08/27/20 1029   Purple%Wound Bed 0 08/27/20 1029   Other%Wound Bed 0 08/27/20 1029   Number of days: 27       Percent of Wound(s) Debrided: approximately 100%    Total Surface Area Debrided:  2.45 sq cm     Bleeding:  Minimal    Hemostasis Achieved:  by pressure    Procedural Pain:  0  / 10     Post Procedural Pain:  0 / 10     Response to treatment:  Well tolerated by patient. Wound is is unchanged    Plan:       Discharge Instructions       PHYSICIAN ORDERS AND DISCHARGE INSTRUCTIONS     NOTE: Upon discharge from the [address], you will receive a patient experience survey.  We would be grateful if you would take the time to fill this survey out.     Wound care order history:                 SABAS's   Right       Left               Date:              Cultures:                Grafts:                Antibiotics:           Continuing wound care orders and information:                 Residence:  Home              Continue home health care with:               Your wound-care supplies will be provided by: [facility]     Wound cleansing:               IS not scrub or use excessive force.              Wash hands with soap and water before and after dressing changes.             REKZO to applying a clean dressing, cleanse wound with normal saline, wound cleanser, or mild soap and water.               Ask the physician or nurse before getting the wound(s) wet in a shower     Daily Wound management:              Keep weight off wounds and reposition every 2 hours.              Avoid standing for long periods of time.              Apply wraps/stockings in AM and remove at bedtime.              If swelling is present, elevate legs to the level of the heart or above for 30 minutes 4-5 times a day and/or when sitting.                                      When taking antibiotics take entire prescription as ordered by physician do not stop taking until medicine is all gone.                                                           Orders for this week: 08/27/20                   Buttock wound-- Clean with soap and water pat dry. Apply Bactroban to wound bed. Cover with Saline damp Fibracol and Sorbact. Cover with Border Gauze. Change Daily     Follow up with Dr Matilda Hendrix 1  weeks in the wound care center  Call  for any questions or concerns.   Date__________   Time____________        Treatment Note      Written Patient Dismissal Instructions Given            Electronically signed by Tarik Quezada MD on 8/27/2020 at 10:53 AM

## 2020-09-01 ENCOUNTER — VIRTUAL VISIT (OUTPATIENT)
Dept: INTERNAL MEDICINE CLINIC | Age: 80
End: 2020-09-01
Payer: MEDICARE

## 2020-09-01 VITALS — HEIGHT: 70 IN | WEIGHT: 230 LBS | BODY MASS INDEX: 32.93 KG/M2

## 2020-09-01 PROCEDURE — G0439 PPPS, SUBSEQ VISIT: HCPCS | Performed by: INTERNAL MEDICINE

## 2020-09-01 PROCEDURE — 1123F ACP DISCUSS/DSCN MKR DOCD: CPT | Performed by: INTERNAL MEDICINE

## 2020-09-01 PROCEDURE — 4040F PNEUMOC VAC/ADMIN/RCVD: CPT | Performed by: INTERNAL MEDICINE

## 2020-09-01 ASSESSMENT — LIFESTYLE VARIABLES
HAS A RELATIVE, FRIEND, DOCTOR, OR ANOTHER HEALTH PROFESSIONAL EXPRESSED CONCERN ABOUT YOUR DRINKING OR SUGGESTED YOU CUT DOWN: 0
HOW MANY STANDARD DRINKS CONTAINING ALCOHOL DO YOU HAVE ON A TYPICAL DAY: 0
HOW OFTEN DURING THE LAST YEAR HAVE YOU NEEDED AN ALCOHOLIC DRINK FIRST THING IN THE MORNING TO GET YOURSELF GOING AFTER A NIGHT OF HEAVY DRINKING: 0
AUDIT-C TOTAL SCORE: 2
HOW OFTEN DURING THE LAST YEAR HAVE YOU BEEN UNABLE TO REMEMBER WHAT HAPPENED THE NIGHT BEFORE BECAUSE YOU HAD BEEN DRINKING: 0
HOW OFTEN DO YOU HAVE A DRINK CONTAINING ALCOHOL: 2
HOW OFTEN DURING THE LAST YEAR HAVE YOU FOUND THAT YOU WERE NOT ABLE TO STOP DRINKING ONCE YOU HAD STARTED: 0
HOW OFTEN DURING THE LAST YEAR HAVE YOU HAD A FEELING OF GUILT OR REMORSE AFTER DRINKING: 0
AUDIT TOTAL SCORE: 2
HOW OFTEN DO YOU HAVE SIX OR MORE DRINKS ON ONE OCCASION: 0
HAVE YOU OR SOMEONE ELSE BEEN INJURED AS A RESULT OF YOUR DRINKING: 0
HOW OFTEN DURING THE LAST YEAR HAVE YOU FAILED TO DO WHAT WAS NORMALLY EXPECTED FROM YOU BECAUSE OF DRINKING: 0

## 2020-09-01 ASSESSMENT — PATIENT HEALTH QUESTIONNAIRE - PHQ9
SUM OF ALL RESPONSES TO PHQ QUESTIONS 1-9: 0
SUM OF ALL RESPONSES TO PHQ QUESTIONS 1-9: 0

## 2020-09-01 NOTE — PROGRESS NOTES
Medicare Annual Wellness Visit  Name: Garrel Brittle Date: 2020   MRN: [id number] Sex: Male   Age: [de-identified] y.o. Ethnicity: Non-/Non    : 1940 Race: Caroline Moraes is here for Medicare AWV    Screenings for behavioral, psychosocial and functional/safety risks, and cognitive dysfunction are all negative except as indicated below. These results, as well as other patient data from the [address] form, are documented in Flowsheets linked to this Encounter. Allergies   Allergen Reactions    Morphine          Prior to Visit Medications    Medication Sig Taking? Authorizing Provider   mupirocin (BACTROBAN) 2 % ointment Apply 3 times daily.  Yes Dangelo Vail MD   glipiZIDE (GLUCOTROL) 5 MG tablet Take 1 tablet by mouth 2 times daily (before meals) Yes Tiny Doshi MD   esomeprazole ([address]) 40 MG delayed release capsule Take 1 capsule by mouth every morning (before breakfast) Yes Tiny Doshi MD   carvedilol (COREG) 25 MG tablet Take 1 tablet by mouth 2 times daily Yes Tiny Doshi MD   dilTIAZem (CARDIZEM CD) 120 MG extended release capsule Take 1 capsule by mouth daily Yes Tiny Doshi MD   atorvastatin (LIPITOR) 40 MG tablet Take 1 tablet by mouth daily Yes Tiny Doshi MD   albuterol sulfate  (90 Base) MCG/ACT inhaler INHALE TWO PUFFS BY MOUTH (INTO THE LUNGS) EVERY 6 HOURS AS NEEDED FOR WHEEZING Yes Tiny Doshi MD   furosemide (LASIX) 40 MG tablet Take 1 tablet by mouth daily Yes Tiny Doshi MD   fluticasone [facility]) 50 MCG/ACT nasal spray 2 sprays by Each Nostril route daily Yes Tiny Doshi MD   Coenzyme Q10 (COQ10) 400 MG CAPS Take by mouth every morning Over The Counter Yes Historical Provider, MD   Magnesium 500 MG CAPS Take by mouth every evening Over The Counter Yes Historical Provider, MD   aspirin 81 MG EC tablet Take 81 mg by mouth nightly Over The pyloric opening, erosive gastritis    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 Fang - L2-S1 laminectomy and forototomy with L4-S1 fusion and fixation    CARDIAC CATHETERIZATION  06/21/2016   Laura Suárez CARDIAC SURGERY  07/12/2016    Aortiv valve repair # 22 medtronic tissue valve, CABG x3 SVG to circ, SVG to PDA and LIMA to LAD    CHOLECYSTECTOMY, LAPAROSCOPIC  2/10    Dr. Francisca Jean      All Teeth Extracted In Past    ENDOSCOPY, COLON, DIAGNOSTIC  09/2013   Laura Suárez RECTAL SURGERY  1980's    Benign Rectal Cyst Removed    THORACENTESIS Right 07/14/2016    Dr Rico Hua Nab  1940's         Family History   Problem Relation Age of Onset    Stroke Father     Heart Disease Father         \"Aortic Valve, 4 Bypasses\"   Atha Jose Armando Alzheimer's Disease Mother     Stroke Mother     Depression Mother     Cancer Brother         Bladder Cancer    Heart Disease Brother         Heart Stents    Cancer Sister         Lung Cancer    Diabetes Son        CareTeam (Including outside providers/suppliers regularly involved in providing care):   Patient Care Team:  Pedro Lama MD as PCP - General (Internal Medicine)  Pedro Lama MD as PCP - [facility] Empaneled Provider  Cornelius Clark MD as Consulting Physician (Ophthalmology)  Leland Gregory MD as Surgeon (General Surgery)  Karuna Carpenter MD as Consulting Physician (Cardiology)    Wt Readings from Last 3 Encounters:   09/01/20 230 lb (104.3 kg)   08/17/20 230 lb 3.2 oz (104.4 kg)   06/22/20 232 lb 6.4 oz (105.4 kg)     Vitals:    09/01/20 1039 Weight: 230 lb (104.3 kg)   Height: 5' 10\" (1.778 m)     Body mass index is 33 kg/m². Based upon direct observation of the patient, evaluation of cognition reveals recent and remote memory intact. Patient's complete Health Risk Assessment and screening values have been reviewed and are found in Flowsheets. The following problems were reviewed today and where indicated follow up appointments were made and/or referrals ordered. Positive Risk Factor Screenings with Interventions:     Health Habits/Nutrition:  Health Habits/Nutrition  Do you exercise for at least 20 minutes 2-3 times per week?: Yes  Have you lost any weight without trying in the past 3 months?: No  Do you eat fewer than 2 meals per day?: No  Have you seen a dentist within the past year?: (!) No  Body mass index is 33 kg/m². Health Habits/Nutrition Interventions:  · Dental exam overdue:  patient encouraged to make appointment with his/her dentist    Hearing/Vision:  No exam data present  Hearing/Vision  Do you or your family notice any trouble with your hearing?: (!) Yes  Do you have difficulty driving, watching TV, or doing any of your daily activities because of your eyesight?: No  Have you had an eye exam within the past year?: Yes  Hearing/Vision Interventions:  · Hearing concerns:  patient declines any further evaluation/treatment for hearing issues.  Pt stated he was going to make an appointment with an audiologist.    Personalized Preventive Plan   Current Health Maintenance Status  Immunization History   Administered Date(s) Administered    Influenza 10/01/2012    Influenza Virus Vaccine 10/05/2011    Influenza, High Dose (Fluzone 65 yrs and older) 09/17/2014, 10/02/2015, 10/05/2016, 10/10/2017, 10/10/2018, 09/27/2019    Influenza, Intradermal, Preservative free 09/26/2013    Pneumococcal Conjugate 13-valent (Fitoxyh19) 09/18/2015    Pneumococcal Polysaccharide ([id number]) 09/01/2007    Tdap (Boostrix, Adacel) 10/05/2011 Not billed by time    Services were provided through a audio synchronous discussion virtually to substitute for in-person clinic visit. Patient and provider were located at their individual homes. --Reji Carlos on 9/1/2020 at 10:42 AM    An electronic signature was used to authenticate this note. Chandra Saul, 9/1/2020, performed the documented evaluation under the direct supervision of the attending physician. This encounter was performed under my, Mariana Sanford, direct supervision, 9/1/2020.

## 2020-09-03 ENCOUNTER — HOSPITAL ENCOUNTER (OUTPATIENT)
Dept: WOUND CARE | Age: 80
Discharge: HOME | End: 2020-09-03
Payer: MEDICARE

## 2020-09-03 VITALS
DIASTOLIC BLOOD PRESSURE: 65 MMHG | SYSTOLIC BLOOD PRESSURE: 147 MMHG | HEART RATE: 82 BPM | RESPIRATION RATE: 20 BRPM | TEMPERATURE: 98.7 F

## 2020-09-03 PROCEDURE — 11042 DBRDMT SUBQ TIS 1ST 20SQCM/<: CPT

## 2020-09-03 NOTE — PROGRESS NOTES
Wound Care Center Progress Note with Procedure Note      Umberto Martinez  AGE: [de-identified] y.o. GENDER: male  : 1940  EPISODE DATE:  9/3/2020     Subjective:     Chief Complaint   Patient presents with    Wound Check     coccyx         HISTORY of PRESENT ILLNESS      Umberto Martinez is a [de-identified] y.o. 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 The wound is of moderate severity. The underlying cause of the wound is dibetes. pressure  Wound Pain Timing/Severity: none  Quality of pain: N/A  Severity of pain:  0 / 10   Modifying Factors: diabetes, chronic pressure, shear force and obesity  Associated Signs/Symptoms: none        PAST MEDICAL HISTORY        Diagnosis Date    Acid reflux     Aortic stenosis 2015, 2014 OTIS    2015 Echo EF 55% Mod AS,    TTE mod-severe AS, EF 55%;  TTE mod aortic stenosis, mild MR and TR, EF >55%    Aortic valve stenosis     Arthritis     Hands, Back     CAD (coronary artery disease)     Sees Dr. Chencho Nguyen CHF (congestive heart failure) ([facility])     3/7 TTE EF 55-60%, mild AS;  stress large inferoseptal MI, no ischemia, EF 30%    Cholelithiasis     COPD (chronic obstructive pulmonary disease) ([facility]     Diabetes type 2, controlled ([facility] Dx     Enlarged prostate     Fuchs' corneal dystrophy Dx     Bilateral Eyes    Full dentures     H/O cardiovascular stress test 2015    cardiolite-normal, no ischemia, EF58%    H/O Doppler lower arterial ultrasound 2019    No significant evidence of large vessel arterial occlusive disease of the lower extremities, Bilateral ABIs couldn't be obtained d/t non compressible arteries.     H/O echocardiogram 1/22/15, 14, 13, EF 55% Mod AS,  ,EF 55%,Mod-Severe aortic stenosis, Trace of  Aortic Insufficiency, Mild MR & TR    [place] (hard of hearing)     Bilateral Ears    HTN (hypertension)     Hx of Doppler echocardiogram 10/15/2018    Left ventricular systolic function is normal with an ejection fraction of 50-55%. Mild concentric left ventricular hypertrophy. The left atrium is mildly dilated. Mild dilatation of the aortic root. 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 No other significant valvulopathy seen. No evidence of pericardial effusion. 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 Fang - L2-S1 laminectomy and forototomy with L4-S1 fusion and fixation    CARDIAC CATHETERIZATION  06/21/2016   Carol Dickens CARDIAC SURGERY  07/12/2016    Aortiv valve repair # 22 medtronic tissue valve, CABG x3 SVG to circ, SVG to PDA and LIMA to LAD    CHOLECYSTECTOMY, LAPAROSCOPIC  2/10    Dr. Lino Cortes      All Teeth Extracted In Past    ENDOSCOPY, COLON, DIAGNOSTIC  09/2013   Carol Dickens RECTAL SURGERY  1980's    Benign Rectal Cyst Removed    THORACENTESIS Right 07/14/2016    Dr Nehal Cha  1940's       FAMILY HISTORY    Family History   Problem Relation Age of Onset    Stroke Father     Heart Disease Father         \"Aortic Valve, 4 Bypasses\"    Alzheimer's Disease Mother     Stroke Mother     Depression Mother     Cancer Brother         Bladder Cancer    Heart Disease Brother         Heart Stents    Cancer Sister         Lung Cancer    Diabetes Son        SOCIAL HISTORY    Social History     Tobacco Use    Smoking status: Former Smoker     Packs/day: 2.00     Years: 45.00     Pack years: 90.00     Types: Cigarettes, Pipe     Start date: 1955     Last attempt to quit: 2000     Years since quittin.1    Smokeless tobacco: Never Used   Substance Use Topics    Alcohol use: Yes     Alcohol/week: 14.0 standard drinks     Types: 14 Standard drinks or equivalent per week     Comment: rarely    Drug use: No       ALLERGIES    Allergies   Allergen Reactions    Morphine        MEDICATIONS    Current Outpatient Medications on File Prior to Encounter   Medication Sig Dispense Refill    glipiZIDE (GLUCOTROL) 5 MG tablet Take 1 tablet by mouth 2 times daily (before meals) 180 tablet 5    esomeprazole (NEXIUM) 40 MG delayed release capsule Take 1 capsule by mouth every morning (before breakfast) 90 capsule 3    carvedilol (COREG) 25 MG tablet Take 1 tablet by mouth 2 times daily 180 tablet 3    dilTIAZem (CARDIZEM CD) 120 MG extended release capsule Take 1 capsule by mouth daily 90 capsule 3    atorvastatin (LIPITOR) 40 MG tablet Take 1 tablet by mouth daily 90 tablet 3    furosemide (LASIX) 40 MG tablet Take 1 tablet by mouth daily 90 tablet 3    fluticasone (FLONASE) 50 MCG/ACT nasal spray 2 sprays by Each Nostril route daily 1 Bottle 5    Coenzyme Q10 (COQ10) 400 MG CAPS Take by mouth every morning Over The Counter      Magnesium 500 MG CAPS Take by mouth every evening Over The Counter      aspirin 81 MG EC tablet Take 81 mg by mouth nightly Over The Counter      mupirocin (BACTROBAN) 2 % ointment Apply 3 times daily.  15 g 1    albuterol sulfate  (90 Base) MCG/ACT inhaler INHALE TWO PUFFS BY MOUTH (INTO THE LUNGS) EVERY 6 HOURS AS NEEDED FOR WHEEZING 18 g 2     No current 09/03/20 1021   Wound Width (cm) 0.6 cm 09/03/20 1021   Wound Depth (cm) 1 cm 09/03/20 1021   Wound Surface Area (cm^2) 0.18 cm^2 09/03/20 1021   Change in Wound Size % (l*w) 88 09/03/20 1021   Wound Volume (cm^3) 0.18 cm^3 09/03/20 1021   Wound Healing % 92 09/03/20 1021   Post-Procedure Length (cm) 0.3 cm 09/03/20 1036   Post-Procedure Width (cm) 0.6 cm 09/03/20 1036   Post-Procedure Depth (cm) 1 cm 09/03/20 1036   Post-Procedure Surface Area (cm^2) 0.18 cm^2 09/03/20 1036   Post-Procedure Volume (cm^3) 0.18 cm^3 09/03/20 1036   Distance Tunneling (cm) 0 cm 09/03/20 1021   Tunneling Position ___ O'Clock 0 09/03/20 1021   Undermining Starts ___ O'Clock 6 09/03/20 1021   Undermining Ends___ O'Clock 12 09/03/20 1021   Undermining Maxium Distance (cm) 0.8 09/03/20 1021   Wound Assessment Red 09/03/20 1021   Drainage Amount Moderate 09/03/20 1021   Drainage Description Yellow 09/03/20 1021   Odor None 09/03/20 1021   Margins Defined edges 09/03/20 1021   Jewels-wound Assessment Pink 09/03/20 1021   Non-staged Wound Description Full thickness 09/03/20 1021   [place]%Wound Bed 0 09/03/20 1021   Red%Wound Bed 100 09/03/20 1021   Yellow%Wound Bed 0 09/03/20 1021   Black%Wound Bed 00 09/03/20 1021   Purple%Wound Bed 0 09/03/20 1021   Other%Wound Bed 0 09/03/20 1021   Number of days: 34       Percent of Wound(s) Debrided: approximately 100%    Total Surface Area Debrided:  0.18 sq cm     Bleeding:  Minimal    Hemostasis Achieved:  by pressure    Procedural Pain:  2  / 10     Post Procedural Pain:  0 / 10     Response to treatment:  Well tolerated by patient. Wound is has slightly improved    Plan:       Discharge Instructions       PHYSICIAN ORDERS AND DISCHARGE INSTRUCTIONS     NOTE: Upon discharge from the [address], you will receive a patient experience survey.  We would be grateful if you would take the time to fill this survey out.     Wound care order history:                 SABAS's   Right       Left               Date:              Cultures:                Grafts:                Antibiotics:           Continuing wound care orders and information:                 Residence:  Home              Continue home health care with:               Your wound-care supplies will be provided by: [facility]     Wound cleansing:               ZW not scrub or use excessive force.              Wash hands with soap and water before and after dressing changes.             CIDZS to applying a clean dressing, cleanse wound with normal saline, wound cleanser, or mild soap and water.               Ask the physician or nurse before getting the wound(s) wet in a shower     Daily Wound management:              Keep weight off wounds and reposition every 2 hours.              Avoid standing for long periods of time.              Apply wraps/stockings in AM and remove at bedtime.              If swelling is present, elevate legs to the level of the heart or above for 30 minutes 4-5 times a day and/or when sitting.                                      When taking antibiotics take entire prescription as ordered by physician do not stop taking until medicine is all gone.                                                           Orders for this week: 09/03/20                   Buttock wound-- Clean with soap and water pat dry. Apply betadine  Damp 4x4 to wound bed.  Cover with Border Gauze. Change Daily     Follow up with Dr Radha Oliveira 1  weeks in the wound care center  Call  for any questions or concerns.   Date__________   Time____________        Treatment Note      Written Patient Dismissal Instructions Given            Electronically signed by Kaitlynn Greenfield MD on 9/3/2020 at 10:43 AM

## 2020-09-15 ENCOUNTER — NURSE ONLY (OUTPATIENT)
Dept: INTERNAL MEDICINE CLINIC | Age: 80
End: 2020-09-15
Payer: MEDICARE

## 2020-09-15 PROCEDURE — 90653 IIV ADJUVANT VACCINE IM: CPT | Performed by: NURSE PRACTITIONER

## 2020-09-15 PROCEDURE — G0008 ADMIN INFLUENZA VIRUS VAC: HCPCS | Performed by: NURSE PRACTITIONER

## 2020-09-17 ENCOUNTER — HOSPITAL ENCOUNTER (OUTPATIENT)
Dept: WOUND CARE | Age: 80
Discharge: HOME | End: 2020-09-17
Payer: MEDICARE

## 2020-09-17 VITALS
TEMPERATURE: 98 F | RESPIRATION RATE: 20 BRPM | HEART RATE: 73 BPM | SYSTOLIC BLOOD PRESSURE: 126 MMHG | DIASTOLIC BLOOD PRESSURE: 59 MMHG

## 2020-09-17 PROCEDURE — 11042 DBRDMT SUBQ TIS 1ST 20SQCM/<: CPT

## 2020-09-17 NOTE — PROGRESS NOTES
Hx of Doppler echocardiogram 10/15/2018    Left ventricular systolic function is normal with an ejection fraction of 50-55%. Mild concentric left ventricular hypertrophy. The left atrium is mildly dilated. Mild dilatation of the aortic root. 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 No other significant valvulopathy seen. No evidence of pericardial effusion. 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 Fang - L2-S1 laminectomy and forototomy with L4-S1 fusion and fixation    CARDIAC CATHETERIZATION  06/21/2016   Katrina Anton CARDIAC SURGERY  07/12/2016    Aortiv valve repair # 22 medtronic tissue valve, CABG x3 SVG to circ, SVG to PDA and LIMA to LAD    CHOLECYSTECTOMY, LAPAROSCOPIC  2/10    Dr. Jonah Pierre      All Teeth Extracted In Past    ENDOSCOPY, COLON, DIAGNOSTIC  09/2013   Katrina Anton RECTAL SURGERY  1980's    Benign Rectal Cyst Removed    THORACENTESIS Right 07/14/2016    Dr Teresa Wyatt- [place]  1940's       FAMILY HISTORY    Family History   Problem Relation Age of Onset    Stroke Father     Heart Disease Father         \"Aortic Valve, 4 Bypasses\"    Alzheimer's Disease Mother     Stroke Mother     Depression SYSTEMS      Constitutional: Negative for systemic symptoms including fever, chills and malaise. Objective:      BP (!) 126/59   Pulse 73   Temp 98 °F (36.7 °C) (Temporal)   Resp 20     PHYSICAL EXAM  Constitutional: Negative for systemic symptoms including fever, chills and malaise. General: The patient is in no acute distress. Mental status:  Patient is appropriate, is  oriented to place and plan of care. Dermatologic exam: Visual inspection of the periwound reveals the skin to be normal in turgor and texture  Wound exam: see wound description below in procedure note      Assessment:     Problem List Items Addressed This Visit     WD-Pressure ulcer of coccygeal region, unspecified pressure ulcer stage - Primary    WD-Type II diabetes mellitus with ulcer ([facility]        Procedure Note    Indications:  Based on my examination of this patient's wound(s) today, sharp excision into necrotic subcutaneous tissue is required to promote healing and evaluate the extent of previous healing. Performed by: Ward Oakes MD    Consent obtained: Yes    Time out taken:  Yes    Pain Control: 4% lidocaine sol. Debridement:Excisional Debridement    Using curette the wound(s) was/were sharply debrided down through and including the removal of subcutaneous tissue. Devitalized Tissue Debrided:  biofilm and necrotic/eschar    Pre Debridement Measurements:  Are located in the Wound Documentation Flow Sheet    All active wounds listed below with today's date are evaluated  Wound(s)    debrided this date include # : 1     Post  Debridement Measurements:  Incision 07/12/16 Chest Mid (Active)   Number of days: 1528       Incision 07/12/16 Leg (Active)   Number of days: 1528       Wound 07/30/20 Coccyx #1 Coccyx (Active)   Wound Image   07/30/20 1115   Wound Other 09/03/20 1021   Dressing Status Clean;Dry; Intact 09/03/20 1050   Dressing Changed Changed/New 09/03/20 1050   Wound Cleansed Rinsed/Irrigated with saline 09/03/20 1021   Wound Length (cm) 0.3 cm 09/17/20 1036   Wound Width (cm) 0.6 cm 09/17/20 1036   Wound Depth (cm) 0.3 cm 09/17/20 1036   Wound Surface Area (cm^2) 0.18 cm^2 09/17/20 1036   Change in Wound Size % (l*w) 88 09/17/20 1036   Wound Volume (cm^3) 0.05 cm^3 09/17/20 1036   Wound Healing % 98 09/17/20 1036   Post-Procedure Length (cm) 0.3 cm 09/17/20 1057   Post-Procedure Width (cm) 0.6 cm 09/17/20 1057   Post-Procedure Depth (cm) 0.3 cm 09/17/20 1057   Post-Procedure Surface Area (cm^2) 0.18 cm^2 09/17/20 1057   Post-Procedure Volume (cm^3) 0.05 cm^3 09/17/20 1057   Distance Tunneling (cm) 0 cm 09/17/20 1036   Tunneling Position ___ O'Clock 0 09/17/20 1036   Undermining Starts ___ O'Clock 6 09/17/20 1036   Undermining Ends___ O'Clock 12 09/17/20 1036   Undermining Maxium Distance (cm) 0.6 09/17/20 1036   Wound Assessment Red 09/17/20 1036   Drainage Amount Moderate 09/17/20 1036   Drainage Description Serosanguinous 09/17/20 1036   Odor None 09/17/20 1036   Margins Defined edges 09/17/20 1036   Jewels-wound Assessment Pink 09/17/20 1036   Non-staged Wound Description Full thickness 09/17/20 1036   Pink%Wound Bed 0 09/17/20 1036   Red%Wound Bed 100 09/17/20 1036   Yellow%Wound Bed 0 09/17/20 1036   Black%Wound Bed 0 09/17/20 1036   Purple%Wound Bed 0 09/17/20 1036   Other%Wound Bed 0 09/17/20 1036   Number of days: 48       Percent of Wound(s) Debrided: approximately 100%    Total Surface Area Debrided:  0.18 sq cm     Bleeding:  Minimal    Hemostasis Achieved:  by pressure    Procedural Pain:  0  / 10     Post Procedural Pain:  0 / 10     Response to treatment:  Well tolerated by patient. Wound is has slightly improved    Plan:       Discharge Instructions       PHYSICIAN ORDERS AND DISCHARGE INSTRUCTIONS     NOTE: Upon discharge from the [address], you will receive a patient experience survey.  We would be grateful if you would take the time to fill this survey out.     Wound care order

## 2020-09-24 ENCOUNTER — HOSPITAL ENCOUNTER (OUTPATIENT)
Dept: WOUND CARE | Age: 80
Discharge: HOME | End: 2020-09-24

## 2020-09-28 RX ORDER — FUROSEMIDE 40 MG/1
40 TABLET ORAL DAILY
Qty: 90 TABLET | Refills: 3 | Status: SHIPPED | OUTPATIENT
Start: 2020-09-28 | End: 2021-01-19

## 2020-10-29 ENCOUNTER — TELEPHONE (OUTPATIENT)
Dept: WOUND CARE | Age: 80
End: 2020-10-29

## 2020-10-29 NOTE — TELEPHONE ENCOUNTER
Called patient and he stated that he is taking care of his wound at home and does not need wound care at this time.   Yonis NUÑEZ

## 2020-11-16 ENCOUNTER — OFFICE VISIT (OUTPATIENT)
Dept: INTERNAL MEDICINE CLINIC | Age: 80
End: 2020-11-16
Payer: MEDICARE

## 2020-11-16 VITALS
DIASTOLIC BLOOD PRESSURE: 64 MMHG | RESPIRATION RATE: 16 BRPM | OXYGEN SATURATION: 94 % | SYSTOLIC BLOOD PRESSURE: 130 MMHG | WEIGHT: 228.2 LBS | BODY MASS INDEX: 32.74 KG/M2 | HEART RATE: 74 BPM

## 2020-11-16 PROCEDURE — 1036F TOBACCO NON-USER: CPT | Performed by: INTERNAL MEDICINE

## 2020-11-16 PROCEDURE — 99213 OFFICE O/P EST LOW 20 MIN: CPT | Performed by: INTERNAL MEDICINE

## 2020-11-16 PROCEDURE — G8417 CALC BMI ABV UP PARAM F/U: HCPCS | Performed by: INTERNAL MEDICINE

## 2020-11-16 PROCEDURE — G8427 DOCREV CUR MEDS BY ELIG CLIN: HCPCS | Performed by: INTERNAL MEDICINE

## 2020-11-16 PROCEDURE — 4040F PNEUMOC VAC/ADMIN/RCVD: CPT | Performed by: INTERNAL MEDICINE

## 2020-11-16 PROCEDURE — 1123F ACP DISCUSS/DSCN MKR DOCD: CPT | Performed by: INTERNAL MEDICINE

## 2020-11-16 PROCEDURE — G8482 FLU IMMUNIZE ORDER/ADMIN: HCPCS | Performed by: INTERNAL MEDICINE

## 2020-11-16 RX ORDER — DULOXETIN HYDROCHLORIDE 20 MG/1
20 CAPSULE, DELAYED RELEASE ORAL DAILY
Qty: 30 CAPSULE | Refills: 5 | Status: SHIPPED | OUTPATIENT
Start: 2020-11-16 | End: 2020-12-07

## 2020-11-16 RX ORDER — FLUTICASONE PROPIONATE 50 MCG
2 SPRAY, SUSPENSION (ML) NASAL DAILY
Qty: 1 BOTTLE | Refills: 5 | Status: SHIPPED | OUTPATIENT
Start: 2020-11-16 | End: 2021-03-19 | Stop reason: SDUPTHER

## 2020-11-16 NOTE — PROGRESS NOTES
Wicho Dial  1940 11/16/20    SUBJECTIVE:      Pt is having nasal congestion despite the zyrtec. He has not mellisa taking his flonase. He is having more difficulties with his memory. He is starting to have some false memories. Temper has mellowed. namenda triggered headaches. He did not have benefit with donepezil. He sleeps much of the da - when he sits in the chair he often falls to sleep. OBJECTIVE:    /64   Pulse 74   Resp 16   Wt 228 lb 3.2 oz (103.5 kg)   SpO2 94%   BMI 32.74 kg/m²     Physical Exam  Constitutional:       Appearance: He is well-developed. Eyes:      General: No scleral icterus. Conjunctiva/sclera: Conjunctivae normal.   Cardiovascular:      Rate and Rhythm: Normal rate and regular rhythm. Heart sounds: Normal heart sounds. No murmur. Pulmonary:      Effort: Pulmonary effort is normal. No respiratory distress. Breath sounds: Normal breath sounds. No wheezing. ASSESSMENT:    1. Irritability    2. Allergic rhinitis, unspecified seasonality, unspecified trigger        PLAN:    Bethel Ulloa was seen today for 3 month follow-up, abdominal pain and discuss medications. Diagnoses and all orders for this visit:    Irritability - discussed options. I am concerned that the irritability will make his living arrangement difficult. I will start pt on cymbalta  -     DULoxetine (CYMBALTA) 20 MG extended release capsule;  Take 1 capsule by mouth daily    Allergic rhinitis, unspecified seasonality, unspecified trigger - use zyyrtec  -     fluticasone (FLONASE) 50 MCG/ACT nasal spray; 2 sprays by Each Nostril route daily

## 2020-12-07 ENCOUNTER — TELEPHONE (OUTPATIENT)
Dept: INTERNAL MEDICINE CLINIC | Age: 80
End: 2020-12-07

## 2020-12-07 ENCOUNTER — VIRTUAL VISIT (OUTPATIENT)
Dept: INTERNAL MEDICINE CLINIC | Age: 80
End: 2020-12-07
Payer: MEDICARE

## 2020-12-07 PROCEDURE — 99442 PR PHYS/QHP TELEPHONE EVALUATION 11-20 MIN: CPT | Performed by: INTERNAL MEDICINE

## 2020-12-07 RX ORDER — TRAZODONE HYDROCHLORIDE 50 MG/1
50 TABLET ORAL NIGHTLY
Qty: 30 TABLET | Refills: 3 | Status: SHIPPED | OUTPATIENT
Start: 2020-12-07 | End: 2020-12-14

## 2020-12-07 NOTE — TELEPHONE ENCOUNTER
Terry Cano called in stating that the pt is up all night crying, can not catch his breath, and stating he wants to die .

## 2020-12-07 NOTE — PROGRESS NOTES
Thais Newman is a [de-identified] y.o. male evaluated via telephone on 2020. Consent:  He and/or health care decision maker is aware that that he may receive a bill for this telephone service, depending on his insurance coverage, and has provided verbal consent to proceed: Yes      Documentation:  I communicated with the patient and/or health care decision maker about sob    . Details of this discussion including any medical advice provided: In the evening pt tends to develop more confusion, tearfullness, thoughts of suicide, insomnia. Symptoms are much better during the daytime. He has been eating less,     pts brother  2-3 weeks ago, and this as affected him significantly. Stop cymbalta - this may be contributing to the symptoms. Will try trazodone - this help help insomnia and nighttime confusion      I affirm this is a Patient Initiated Episode with a Patient who has not had a related appointment within my department in the past 7 days or scheduled within the next 24 hours.     Patient identification was verified at the start of the visit: Yes    Total Time: minutes: 11-20 minutes    Note: not billable if this call serves to triage the patient into an appointment for the relevant concern      [address] [place] [address]

## 2020-12-11 ENCOUNTER — HOSPITAL ENCOUNTER (EMERGENCY)
Age: 80
Discharge: HOME | End: 2020-12-11
Payer: MEDICARE

## 2020-12-11 ENCOUNTER — APPOINTMENT (OUTPATIENT)
Dept: CT IMAGING | Age: 80
End: 2020-12-11
Payer: MEDICARE

## 2020-12-11 VITALS
HEART RATE: 79 BPM | BODY MASS INDEX: 32.64 KG/M2 | SYSTOLIC BLOOD PRESSURE: 136 MMHG | TEMPERATURE: 98.2 F | DIASTOLIC BLOOD PRESSURE: 72 MMHG | WEIGHT: 228 LBS | HEIGHT: 70 IN | OXYGEN SATURATION: 100 % | RESPIRATION RATE: 16 BRPM

## 2020-12-11 LAB
ABO/RH: NORMAL
ALBUMIN SERPL-MCNC: 4 GM/DL (ref 3.4–5)
ALP BLD-CCNC: 63 IU/L (ref 40–128)
ALT SERPL-CCNC: 23 U/L (ref 10–40)
ANION GAP SERPL CALCULATED.3IONS-SCNC: 8 MMOL/L (ref 4–16)
ANTIBODY SCREEN: NEGATIVE
AST SERPL-CCNC: 21 IU/L (ref 15–37)
BASOPHILS ABSOLUTE: 0.1 K/CU MM
BASOPHILS RELATIVE PERCENT: 1.5 % (ref 0–1)
BILIRUB SERPL-MCNC: 1.3 MG/DL (ref 0–1)
BUN BLDV-MCNC: 17 MG/DL (ref 6–23)
CALCIUM SERPL-MCNC: 9 MG/DL (ref 8.3–10.6)
CHLORIDE BLD-SCNC: 94 MMOL/L (ref 99–110)
CO2: 32 MMOL/L (ref 21–32)
CREAT SERPL-MCNC: 1.1 MG/DL (ref 0.9–1.3)
DIFFERENTIAL TYPE: ABNORMAL
EOSINOPHILS ABSOLUTE: 0.3 K/CU MM
EOSINOPHILS RELATIVE PERCENT: 3.3 % (ref 0–3)
GFR AFRICAN AMERICAN: >60 ML/MIN/1.73M2
GFR NON-AFRICAN AMERICAN: >60 ML/MIN/1.73M2
GLUCOSE BLD-MCNC: 106 MG/DL (ref 70–99)
HCT VFR BLD CALC: 38.6 % (ref 42–52)
HEMOGLOBIN: 12.4 GM/DL (ref 13.5–18)
IMMATURE NEUTROPHIL %: 0.8 % (ref 0–0.43)
LYMPHOCYTES ABSOLUTE: 1.7 K/CU MM
LYMPHOCYTES RELATIVE PERCENT: 18.7 % (ref 24–44)
MCH RBC QN AUTO: 29.1 PG (ref 27–31)
MCHC RBC AUTO-ENTMCNC: 32.1 % (ref 32–36)
MCV RBC AUTO: 90.6 FL (ref 78–100)
MONOCYTES ABSOLUTE: 1.1 K/CU MM
MONOCYTES RELATIVE PERCENT: 12.1 % (ref 0–4)
NUCLEATED RBC %: 0 %
PDW BLD-RTO: 17.3 % (ref 11.7–14.9)
PLATELET # BLD: 250 K/CU MM (ref 140–440)
PMV BLD AUTO: 9 FL (ref 7.5–11.1)
POTASSIUM SERPL-SCNC: 4.2 MMOL/L (ref 3.5–5.1)
RBC # BLD: 4.26 M/CU MM (ref 4.6–6.2)
SEGMENTED NEUTROPHILS ABSOLUTE COUNT: 5.9 K/CU MM
SEGMENTED NEUTROPHILS RELATIVE PERCENT: 63.6 % (ref 36–66)
SODIUM BLD-SCNC: 134 MMOL/L (ref 135–145)
TOTAL IMMATURE NEUTOROPHIL: 0.07 K/CU MM
TOTAL NUCLEATED RBC: 0 K/CU MM
TOTAL PROTEIN: 6.9 GM/DL (ref 6.4–8.2)
WBC # BLD: 9.3 K/CU MM (ref 4–10.5)

## 2020-12-11 PROCEDURE — 96365 THER/PROPH/DIAG IV INF INIT: CPT

## 2020-12-11 PROCEDURE — 80053 COMPREHEN METABOLIC PANEL: CPT

## 2020-12-11 PROCEDURE — 74177 CT ABD & PELVIS W/CONTRAST: CPT

## 2020-12-11 PROCEDURE — 96368 THER/DIAG CONCURRENT INF: CPT

## 2020-12-11 PROCEDURE — 86850 RBC ANTIBODY SCREEN: CPT

## 2020-12-11 PROCEDURE — 99285 EMERGENCY DEPT VISIT HI MDM: CPT

## 2020-12-11 PROCEDURE — 85025 COMPLETE CBC W/AUTO DIFF WBC: CPT

## 2020-12-11 PROCEDURE — 6360000002 HC RX W HCPCS: Performed by: PHYSICIAN ASSISTANT

## 2020-12-11 PROCEDURE — 2580000003 HC RX 258: Performed by: PHYSICIAN ASSISTANT

## 2020-12-11 PROCEDURE — 86901 BLOOD TYPING SEROLOGIC RH(D): CPT

## 2020-12-11 PROCEDURE — 2500000003 HC RX 250 WO HCPCS: Performed by: PHYSICIAN ASSISTANT

## 2020-12-11 PROCEDURE — 86900 BLOOD TYPING SEROLOGIC ABO: CPT

## 2020-12-11 PROCEDURE — 6360000004 HC RX CONTRAST MEDICATION: Performed by: PHYSICIAN ASSISTANT

## 2020-12-11 RX ORDER — SODIUM CHLORIDE 0.9 % (FLUSH) 0.9 %
10 SYRINGE (ML) INJECTION 2 TIMES DAILY
Status: DISCONTINUED | OUTPATIENT
Start: 2020-12-11 | End: 2020-12-11 | Stop reason: HOSPADM

## 2020-12-11 RX ADMIN — CEFTRIAXONE 1 G: 1 INJECTION, POWDER, FOR SOLUTION INTRAMUSCULAR; INTRAVENOUS at 06:15

## 2020-12-11 RX ADMIN — IOPAMIDOL 75 ML: 755 INJECTION, SOLUTION INTRAVENOUS at 04:28

## 2020-12-11 RX ADMIN — METRONIDAZOLE 500 MG: 500 INJECTION, SOLUTION INTRAVENOUS at 06:15

## 2020-12-11 ASSESSMENT — PAIN SCALES - GENERAL: PAINLEVEL_OUTOF10: 6

## 2020-12-11 NOTE — ED TRIAGE NOTES
Pt presents to the ED via EMS c/o coccyx ulcer that has been bleeding. States this has been going on for a long time, around a decade. Pt is alert and oriented, rates pain 6/10. Lives at home with his wife, wife was positive for covid, has been negative for five days.

## 2020-12-11 NOTE — ED NOTES
Pts. O2 saturation dropped to 86%. Pt. Put on 2L O2 at this time. Pts. O2 came up to 100 on 2L.      Albert Arteaga  12/11/20 [id number]

## 2020-12-11 NOTE — ED PROVIDER NOTES
Emergency [address] EMERGENCY DEPARTMENT    Patient: Mulu Pastor  MRN: [id number]  : 1940  Date of Evaluation: 2020  ED Provider: Dimas Adler PA-C    Chief Complaint       Chief Complaint   Patient presents with    Wound Check     coccyx ulcer, bleeding       Marie Rodríguez is a [de-identified] y.o. male who presents to the emergency department for bleeding from his sacral wound. Patient reports this is a chronic ulcer for 30+ years. He states tonight he had a bowel movement and then started having bleeding from the site. He denies any associated pain. Denies rectal pain or abdominal pain. Denies fever or chills. States he is supposed to see his PCP, Dr. Fanny Murillo, tomorrow for the same. ROS     CONSTITUTIONAL:  Denies fever. EYES:  Denies visual changes. HEAD:  Denies headache. ENT:  Denies earache, nasal congestion, sore throat. NECK:  Denies neck pain. RESPIRATORY:  Denies any shortness of breath. CARDIOVASCULAR:  Denies chest pain. GI:  Denies nausea or vomiting. :  Denies urinary symptoms. MUSCULOSKELETAL:  Denies extremity pain or swelling. BACK:  Denies back pain. INTEGUMENT:  + chronic ulcer. LYMPHATIC:  Denies lymphadenopathy. NEUROLOGIC:  Denies any numbness/tingling.     Past History     Past Medical History:   Diagnosis Date    Acid reflux     Aortic stenosis 2015, 2014 OTIS    2015 Echo EF 55% Mod AS,    TTE mod-severe AS, EF 55%;  TTE mod aortic stenosis, mild MR and TR, EF >55%    Aortic valve stenosis     Arthritis     Hands, Back     CAD (coronary artery disease)     Sees Dr. 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 Kurtis Quintero      All Teeth Extracted In Past    ENDOSCOPY, COLON, DIAGNOSTIC  2013   Julián Loser RECTAL SURGERY  's    Benign Rectal Cyst Removed    THORACENTESIS Right 2016    Dr Feliz Telles- [facility]       Social History     Socioeconomic History    Marital status:      Spouse name: None    Number of children: 2    Years of education: None    Highest education level: None   Occupational History    Occupation: retired plant [employer]    Occupation: former owner of [employer]   Social Needs    Financial resource strain: None    Food insecurity     Worry: None     Inability: None    Transportation needs     Medical: None     Non-medical: None   Tobacco Use    Smoking status: Former Smoker     Packs/day: 2.00     Years: 45.00     Pack years: 90.00     Types: Cigarettes, Pipe     Start date: 1955     Last attempt to quit: 2000     Years since quittin.4    Smokeless tobacco: Never Used   Substance and Sexual Activity    Alcohol use:  Yes     Alcohol/week: 14.0 standard drinks     Types: 14 Standard drinks or equivalent per week     Comment: rarely    Drug use: No    Sexual activity: Never   Lifestyle    Physical activity     Days per week: None     Minutes per session: None    Stress: None   Relationships    Social connections     Talks on phone: None     Gets together: None     Attends Roman [employer] service: None     Active member of club or organization: None     Attends meetings of clubs or organizations: None     Relationship status: None    Intimate partner violence     Fear of current or ex partner: None     Emotionally abused: None     Physically abused: None     Forced sexual activity: None   Other Topics Concern    None   Social History Narrative    Diet low salt           Medications/Allergies     Previous Medications placed or performed during the hospital encounter of 12/11/20   CBC Auto Differential   Result Value Ref Range    WBC 9.3 4.0 - 10.5 K/CU MM    RBC 4.26 (L) 4.6 - 6.2 M/CU MM    Hemoglobin 12.4 (L) 13.5 - 18.0 GM/DL    Hematocrit 38.6 (L) 42 - 52 %    MCV 90.6 78 - 100 FL    MCH 29.1 27 - 31 PG    MCHC 32.1 32.0 - 36.0 %    RDW 17.3 (H) 11.7 - 14.9 %    Platelets 032 571 - 198 K/CU MM    MPV 9.0 7.5 - 11.1 FL    Differential Type AUTOMATED DIFFERENTIAL     Segs Relative 63.6 36 - 66 %    Lymphocytes % 18.7 (L) 24 - 44 %    Monocytes % 12.1 (H) 0 - 4 %    Eosinophils % 3.3 (H) 0 - 3 %    Basophils % 1.5 (H) 0 - 1 %    Segs Absolute 5.9 K/CU MM    Lymphocytes Absolute 1.7 K/CU MM    Monocytes Absolute 1.1 K/CU MM    Eosinophils Absolute 0.3 K/CU MM    Basophils Absolute 0.1 K/CU MM    Nucleated RBC % 0.0 %    Total Nucleated RBC 0.0 K/CU MM    Total Immature Neutrophil 0.07 K/CU MM    Immature Neutrophil % 0.8 (H) 0 - 0.43 %   Comprehensive Metabolic Panel w/ Reflex to MG   Result Value Ref Range    Sodium 134 (L) 135 - 145 MMOL/L    Potassium 4.2 3.5 - 5.1 MMOL/L    Chloride 94 (L) 99 - 110 mMol/L    CO2 32 21 - 32 MMOL/L    BUN 17 6 - 23 MG/DL    CREATININE 1.1 0.9 - 1.3 MG/DL    Glucose 106 (H) 70 - 99 MG/DL    Calcium 9.0 8.3 - 10.6 MG/DL    Alb 4.0 3.4 - 5.0 GM/DL    Total Protein 6.9 6.4 - 8.2 GM/DL    Total Bilirubin 1.3 (H) 0.0 - 1.0 MG/DL    ALT 23 10 - 40 U/L    AST 21 15 - 37 IU/L    Alkaline Phosphatase 63 40 - 128 IU/L    GFR Non-African American >60 >60 mL/min/1.73m2    GFR African American >60 >60 mL/min/1.73m2    Anion Gap 8 4 - 16   TYPE AND SCREEN   Result Value Ref Range    ABO/Rh O POSITIVE     Antibody Screen NEGATIVE        Radiographs:  Ct Abdomen Pelvis W Iv Contrast    Result Date: 12/11/2020  1. 4.1 cm left perianal fluid collection suspicious for perianal abscess. 2. Adjacent rectal wall thickening may be reactive.   Consider outpatient evaluation with direct visualization after treatment to exclude underlying lesion. 3. Curvilinear soft tissue focus adjacent to the right gluteal cleft is favored to reflect sequela of current or prior perianal fistula. 4. Diverticulosis. 5. Cardiomegaly and coronary artery disease with aortic valvular calcification. ED Course and MDM   -  Patient seen and evaluated in the emergency department. -  Triage and nursing notes reviewed and incorporated. -  Old chart records reviewed and incorporated. -  Supervising physician was Dr. Chidi Hughes. Patient was seen independently. -  Work-up included:  See above  -  ED medications:  Flagyl, Rocephin  -  Results discussed with patient. CT was concerning for 4.1 cm perianal abscess--IV ABX were initiated. Labs are unremarkable. No leukocytosis. H&H stable. I spoke with Dr. Galina Cunningham, Gen Surg on call, who does not believe this to be an abscess, particularly given my physical exam and reassuring labs/vital signs. He is scheduled to see his PCP later today and has been to the [address] [place] [address] in the past--recommend return to following there. Return here as needed. He is agreeable with plan of care and disposition.  -  Disposition:  Home    In light of current events, I did utilize appropriate PPE (including N95 and surgical face mask, safety glasses, and gloves, as recommended by the health facility/national standard best practice, during my bedside interactions with the patient. Final Impression      1. Chronic ulcer of sacral region, unspecified ulcer stage ([facility]    2.  Bleeding            DISPOSITION        Isabell Coe, [address] [place], [place]  12/11/20 9888

## 2020-12-14 ENCOUNTER — VIRTUAL VISIT (OUTPATIENT)
Dept: INTERNAL MEDICINE CLINIC | Age: 80
End: 2020-12-14
Payer: MEDICARE

## 2020-12-14 PROCEDURE — 99442 PR PHYS/QHP TELEPHONE EVALUATION 11-20 MIN: CPT | Performed by: INTERNAL MEDICINE

## 2020-12-14 RX ORDER — HYDROCORTISONE 25 MG/G
CREAM TOPICAL
COMMUNITY

## 2020-12-14 RX ORDER — RISPERIDONE 0.5 MG/1
0.5 TABLET, FILM COATED ORAL 2 TIMES DAILY
Qty: 60 TABLET | Refills: 0 | Status: SHIPPED | OUTPATIENT
Start: 2020-12-14 | End: 2020-12-30 | Stop reason: SDUPTHER

## 2020-12-14 NOTE — PROGRESS NOTES
Chavo Ocampo is a [de-identified] y.o. male evaluated via telephone on 12/14/2020. Consent:  He and/or health care decision maker is aware that that he may receive a bill for this telephone service, depending on his insurance coverage, and has provided verbal consent to proceed: Yes      Documentation:  I communicated with the patient and/or health care decision maker about confusion. Details of this discussion including any medical advice provided:     Pt is having thoughts of suicide. He is getting very agitated at night, and the trazodone provided no benefit. He was having diarrhea but this has stopped today. He is tolerating PO    He wore O2 while in the ER, and he found that this was very beneficial.     PLan: start risperedol to see if this helps his nighttime agitation; will consider SSRI as well for depression    Check home O2    Close f/u Wednesday    I affirm this is a Patient Initiated Episode with a Patient who has not had a related appointment within my department in the past 7 days or scheduled within the next 24 hours.     Patient identification was verified at the start of the visit: Yes    Total Time: minutes: 11-20 minutes    Note: not billable if this call serves to triage the patient into an appointment for the relevant concern      Facundo Almanzar

## 2020-12-16 ENCOUNTER — TELEPHONE (OUTPATIENT)
Dept: INTERNAL MEDICINE CLINIC | Age: 80
End: 2020-12-16

## 2020-12-16 NOTE — TELEPHONE ENCOUNTER
Spoke with pts wife today; he is sleeping at this time. Doing much better with the risperdal. Less agitated, sleeping well, not talking about dying. Continue med    Await O2 eval    F/u in 2 weeks.

## 2020-12-30 ENCOUNTER — OFFICE VISIT (OUTPATIENT)
Dept: INTERNAL MEDICINE CLINIC | Age: 80
End: 2020-12-30
Payer: MEDICARE

## 2020-12-30 VITALS
HEART RATE: 91 BPM | RESPIRATION RATE: 18 BRPM | DIASTOLIC BLOOD PRESSURE: 68 MMHG | WEIGHT: 222 LBS | SYSTOLIC BLOOD PRESSURE: 138 MMHG | OXYGEN SATURATION: 94 % | BODY MASS INDEX: 31.85 KG/M2

## 2020-12-30 PROCEDURE — 1036F TOBACCO NON-USER: CPT | Performed by: INTERNAL MEDICINE

## 2020-12-30 PROCEDURE — 4040F PNEUMOC VAC/ADMIN/RCVD: CPT | Performed by: INTERNAL MEDICINE

## 2020-12-30 PROCEDURE — G8417 CALC BMI ABV UP PARAM F/U: HCPCS | Performed by: INTERNAL MEDICINE

## 2020-12-30 PROCEDURE — 1123F ACP DISCUSS/DSCN MKR DOCD: CPT | Performed by: INTERNAL MEDICINE

## 2020-12-30 PROCEDURE — 99213 OFFICE O/P EST LOW 20 MIN: CPT | Performed by: INTERNAL MEDICINE

## 2020-12-30 PROCEDURE — G8427 DOCREV CUR MEDS BY ELIG CLIN: HCPCS | Performed by: INTERNAL MEDICINE

## 2020-12-30 PROCEDURE — G8482 FLU IMMUNIZE ORDER/ADMIN: HCPCS | Performed by: INTERNAL MEDICINE

## 2020-12-30 RX ORDER — MONTELUKAST SODIUM 10 MG/1
10 TABLET ORAL DAILY
Qty: 30 TABLET | Refills: 3 | Status: SHIPPED | OUTPATIENT
Start: 2020-12-30 | End: 2021-03-19

## 2020-12-30 RX ORDER — RISPERIDONE 0.5 MG/1
0.5 TABLET, FILM COATED ORAL 2 TIMES DAILY
Qty: 180 TABLET | Refills: 3 | Status: SHIPPED | OUTPATIENT
Start: 2020-12-30 | End: 2021-01-05 | Stop reason: SDUPTHER

## 2020-12-30 NOTE — PROGRESS NOTES
Reema Ruelasnena  1940 12/30/20    SUBJECTIVE:    \"That little pill is really helping him. \" he is taking the risperdal at 2 and 8 pm. He is sleeping better, has not been thinking of suicide, is not wandering the house. He is more active during the day,and is sleeping well during the night. Pt continues to have nasal congestion bari at night. This is despite the flonase and zyrtec. OBJECTIVE:    /68   Pulse 91   Resp 18   Wt 222 lb (100.7 kg)   SpO2 94%   BMI 31.85 kg/m²     Physical Exam    ASSESSMENT:    1. Memory deficit        PLAN:    Inna Bernabe was seen today for discuss medications. Diagnoses and all orders for this visit:    Memory deficit - markedly improved with the risperdal. Discussed with pt and wife the risks (bari increased mortality) with the med. They are both strongly in agreement that he should stay on the med, and I agree as well. Other orders - will add singulair to see if this helps the nasal congestion  -     montelukast (SINGULAIR) 10 MG tablet; Take 1 tablet by mouth daily  -     risperiDONE (RISPERDAL) 0.5 MG tablet;  Take 1 tablet by mouth 2 times daily

## 2021-01-05 RX ORDER — RISPERIDONE 0.5 MG/1
0.5 TABLET, FILM COATED ORAL 2 TIMES DAILY
Qty: 180 TABLET | Refills: 3 | Status: SHIPPED | OUTPATIENT
Start: 2021-01-05

## 2021-01-18 ENCOUNTER — TELEPHONE (OUTPATIENT)
Dept: INTERNAL MEDICINE CLINIC | Age: 81
End: 2021-01-18

## 2021-01-18 ENCOUNTER — OFFICE VISIT (OUTPATIENT)
Dept: INTERNAL MEDICINE CLINIC | Age: 81
End: 2021-01-18
Payer: MEDICARE

## 2021-01-18 VITALS
SYSTOLIC BLOOD PRESSURE: 148 MMHG | WEIGHT: 238 LBS | BODY MASS INDEX: 34.15 KG/M2 | DIASTOLIC BLOOD PRESSURE: 64 MMHG | HEART RATE: 98 BPM | OXYGEN SATURATION: 94 % | RESPIRATION RATE: 18 BRPM

## 2021-01-18 DIAGNOSIS — R60.0 LOWER EXTREMITY EDEMA: ICD-10-CM

## 2021-01-18 DIAGNOSIS — R06.02 SOB (SHORTNESS OF BREATH): Primary | ICD-10-CM

## 2021-01-18 DIAGNOSIS — R06.02 SOB (SHORTNESS OF BREATH): ICD-10-CM

## 2021-01-18 LAB
BASOPHILS ABSOLUTE: 0.1 K/UL (ref 0–0.2)
BASOPHILS RELATIVE PERCENT: 0.8 %
EOSINOPHILS ABSOLUTE: 0.2 K/UL (ref 0–0.6)
EOSINOPHILS RELATIVE PERCENT: 2.3 %
HCT VFR BLD CALC: 35.6 % (ref 40.5–52.5)
HEMOGLOBIN: 11.9 G/DL (ref 13.5–17.5)
LYMPHOCYTES ABSOLUTE: 1.6 K/UL (ref 1–5.1)
LYMPHOCYTES RELATIVE PERCENT: 21.9 %
MCH RBC QN AUTO: 29.7 PG (ref 26–34)
MCHC RBC AUTO-ENTMCNC: 33.4 G/DL (ref 31–36)
MCV RBC AUTO: 88.9 FL (ref 80–100)
MONOCYTES ABSOLUTE: 0.9 K/UL (ref 0–1.3)
MONOCYTES RELATIVE PERCENT: 11.7 %
NEUTROPHILS ABSOLUTE: 4.7 K/UL (ref 1.7–7.7)
NEUTROPHILS RELATIVE PERCENT: 63.3 %
PDW BLD-RTO: 20.2 % (ref 12.4–15.4)
PLATELET # BLD: 209 K/UL (ref 135–450)
PMV BLD AUTO: 7.2 FL (ref 5–10.5)
RBC # BLD: 4.01 M/UL (ref 4.2–5.9)
WBC # BLD: 7.4 K/UL (ref 4–11)

## 2021-01-18 PROCEDURE — 1123F ACP DISCUSS/DSCN MKR DOCD: CPT | Performed by: INTERNAL MEDICINE

## 2021-01-18 PROCEDURE — 99214 OFFICE O/P EST MOD 30 MIN: CPT | Performed by: INTERNAL MEDICINE

## 2021-01-18 PROCEDURE — 4040F PNEUMOC VAC/ADMIN/RCVD: CPT | Performed by: INTERNAL MEDICINE

## 2021-01-18 PROCEDURE — G8482 FLU IMMUNIZE ORDER/ADMIN: HCPCS | Performed by: INTERNAL MEDICINE

## 2021-01-18 PROCEDURE — 36415 COLL VENOUS BLD VENIPUNCTURE: CPT | Performed by: INTERNAL MEDICINE

## 2021-01-18 PROCEDURE — G8417 CALC BMI ABV UP PARAM F/U: HCPCS | Performed by: INTERNAL MEDICINE

## 2021-01-18 PROCEDURE — G8510 SCR DEP NEG, NO PLAN REQD: HCPCS | Performed by: INTERNAL MEDICINE

## 2021-01-18 PROCEDURE — 1036F TOBACCO NON-USER: CPT | Performed by: INTERNAL MEDICINE

## 2021-01-18 PROCEDURE — G8427 DOCREV CUR MEDS BY ELIG CLIN: HCPCS | Performed by: INTERNAL MEDICINE

## 2021-01-18 ASSESSMENT — PATIENT HEALTH QUESTIONNAIRE - PHQ9
SUM OF ALL RESPONSES TO PHQ QUESTIONS 1-9: 0

## 2021-01-18 NOTE — PROGRESS NOTES
SOB (shortness of breath) - is retaining fluid, may be from sodium but also need to check labs and echo. Depending on results may benefit from increasing lasix, but he needs to get rid of all salt in his house. will f/u in one week   -     Comprehensive Metabolic Panel; Future  -     CBC Auto Differential; Future  -     Cancel: BRAIN NATRIURETIC PEPTIDE (BNP); Future  -     Echo 2d w doppler w color w contrast; Future    Lower extremity edema  -     Comprehensive Metabolic Panel; Future  -     CBC Auto Differential; Future  -     Cancel: BRAIN NATRIURETIC PEPTIDE (BNP);  Future  -     Echo 2d w doppler w color w contrast; Future

## 2021-01-19 LAB
A/G RATIO: 1.4 (ref 1.1–2.2)
ALBUMIN SERPL-MCNC: 4.1 G/DL (ref 3.4–5)
ALP BLD-CCNC: 100 U/L (ref 40–129)
ALT SERPL-CCNC: 9 U/L (ref 10–40)
ANION GAP SERPL CALCULATED.3IONS-SCNC: 11 MMOL/L (ref 3–16)
AST SERPL-CCNC: 17 U/L (ref 15–37)
BILIRUB SERPL-MCNC: 0.5 MG/DL (ref 0–1)
BUN BLDV-MCNC: 10 MG/DL (ref 7–20)
CALCIUM SERPL-MCNC: 9.3 MG/DL (ref 8.3–10.6)
CHLORIDE BLD-SCNC: 97 MMOL/L (ref 99–110)
CO2: 31 MMOL/L (ref 21–32)
CREAT SERPL-MCNC: 0.7 MG/DL (ref 0.8–1.3)
GFR AFRICAN AMERICAN: >60
GFR NON-AFRICAN AMERICAN: >60
GLOBULIN: 3 G/DL
GLUCOSE BLD-MCNC: 177 MG/DL (ref 70–99)
POTASSIUM SERPL-SCNC: 4.3 MMOL/L (ref 3.5–5.1)
PRO-BNP: 576 PG/ML (ref 0–449)
SODIUM BLD-SCNC: 139 MMOL/L (ref 136–145)
TOTAL PROTEIN: 7.1 G/DL (ref 6.4–8.2)

## 2021-01-19 RX ORDER — FUROSEMIDE 40 MG/1
80 TABLET ORAL DAILY
Qty: 90 TABLET | Refills: 3
Start: 2021-01-19 | End: 2021-03-19 | Stop reason: SDUPTHER

## 2021-01-22 ENCOUNTER — HOSPITAL ENCOUNTER (OUTPATIENT)
Dept: NON INVASIVE DIAGNOSTICS | Age: 81
Discharge: HOME | End: 2021-01-22
Payer: MEDICARE

## 2021-01-22 DIAGNOSIS — R06.02 SOB (SHORTNESS OF BREATH): ICD-10-CM

## 2021-01-22 DIAGNOSIS — R60.0 LOWER EXTREMITY EDEMA: ICD-10-CM

## 2021-01-22 LAB
LV EF: 53 %
LVEF MODALITY: NORMAL

## 2021-01-22 PROCEDURE — 93306 TTE W/DOPPLER COMPLETE: CPT

## 2021-03-19 ENCOUNTER — OFFICE VISIT (OUTPATIENT)
Dept: INTERNAL MEDICINE CLINIC | Age: 81
End: 2021-03-19
Payer: MEDICARE

## 2021-03-19 VITALS
BODY MASS INDEX: 34.64 KG/M2 | DIASTOLIC BLOOD PRESSURE: 70 MMHG | WEIGHT: 241.4 LBS | OXYGEN SATURATION: 93 % | SYSTOLIC BLOOD PRESSURE: 120 MMHG | RESPIRATION RATE: 18 BRPM | HEART RATE: 76 BPM

## 2021-03-19 DIAGNOSIS — I10 ESSENTIAL HYPERTENSION: ICD-10-CM

## 2021-03-19 DIAGNOSIS — I25.810 CORONARY ARTERY DISEASE INVOLVING CORONARY BYPASS GRAFT OF NATIVE HEART WITHOUT ANGINA PECTORIS: ICD-10-CM

## 2021-03-19 DIAGNOSIS — J30.9 ALLERGIC RHINITIS, UNSPECIFIED SEASONALITY, UNSPECIFIED TRIGGER: ICD-10-CM

## 2021-03-19 DIAGNOSIS — R09.81 NASAL CONGESTION: Primary | ICD-10-CM

## 2021-03-19 DIAGNOSIS — J41.0 SIMPLE CHRONIC BRONCHITIS (HCC): ICD-10-CM

## 2021-03-19 DIAGNOSIS — E11.40 TYPE 2 DIABETES, CONTROLLED, WITH NEUROPATHY (HCC): ICD-10-CM

## 2021-03-19 DIAGNOSIS — K21.9 GASTROESOPHAGEAL REFLUX DISEASE WITHOUT ESOPHAGITIS: ICD-10-CM

## 2021-03-19 DIAGNOSIS — F33.41 RECURRENT MAJOR DEPRESSIVE DISORDER, IN PARTIAL REMISSION (HCC): ICD-10-CM

## 2021-03-19 PROCEDURE — G8428 CUR MEDS NOT DOCUMENT: HCPCS | Performed by: INTERNAL MEDICINE

## 2021-03-19 PROCEDURE — 1036F TOBACCO NON-USER: CPT | Performed by: INTERNAL MEDICINE

## 2021-03-19 PROCEDURE — G8926 SPIRO NO PERF OR DOC: HCPCS | Performed by: INTERNAL MEDICINE

## 2021-03-19 PROCEDURE — G8482 FLU IMMUNIZE ORDER/ADMIN: HCPCS | Performed by: INTERNAL MEDICINE

## 2021-03-19 PROCEDURE — 4040F PNEUMOC VAC/ADMIN/RCVD: CPT | Performed by: INTERNAL MEDICINE

## 2021-03-19 PROCEDURE — 3023F SPIROM DOC REV: CPT | Performed by: INTERNAL MEDICINE

## 2021-03-19 PROCEDURE — G8417 CALC BMI ABV UP PARAM F/U: HCPCS | Performed by: INTERNAL MEDICINE

## 2021-03-19 PROCEDURE — 99213 OFFICE O/P EST LOW 20 MIN: CPT | Performed by: INTERNAL MEDICINE

## 2021-03-19 PROCEDURE — 1123F ACP DISCUSS/DSCN MKR DOCD: CPT | Performed by: INTERNAL MEDICINE

## 2021-03-19 RX ORDER — CARVEDILOL 25 MG/1
25 TABLET ORAL 2 TIMES DAILY
Qty: 180 TABLET | Refills: 3 | Status: SHIPPED | OUTPATIENT
Start: 2021-03-19 | End: 2021-08-11 | Stop reason: SDUPTHER

## 2021-03-19 RX ORDER — FUROSEMIDE 40 MG/1
80 TABLET ORAL DAILY
Qty: 90 TABLET | Refills: 3 | Status: SHIPPED | OUTPATIENT
Start: 2021-03-19 | End: 2021-04-19 | Stop reason: DRUGHIGH

## 2021-03-19 RX ORDER — MONTELUKAST SODIUM 10 MG/1
10 TABLET ORAL DAILY
Qty: 30 TABLET | Refills: 3 | Status: CANCELLED | OUTPATIENT
Start: 2021-03-19

## 2021-03-19 RX ORDER — ESOMEPRAZOLE MAGNESIUM 40 MG/1
40 CAPSULE, DELAYED RELEASE ORAL
Qty: 90 CAPSULE | Refills: 3 | Status: SHIPPED | OUTPATIENT
Start: 2021-03-19

## 2021-03-19 RX ORDER — FLUTICASONE PROPIONATE 50 MCG
2 SPRAY, SUSPENSION (ML) NASAL DAILY
Qty: 1 BOTTLE | Refills: 5 | Status: SHIPPED | OUTPATIENT
Start: 2021-03-19

## 2021-03-19 RX ORDER — ATORVASTATIN CALCIUM 40 MG/1
40 TABLET, FILM COATED ORAL DAILY
Qty: 90 TABLET | Refills: 3 | Status: SHIPPED | OUTPATIENT
Start: 2021-03-19 | End: 2021-05-10 | Stop reason: SDUPTHER

## 2021-03-19 RX ORDER — ALBUTEROL SULFATE 90 UG/1
AEROSOL, METERED RESPIRATORY (INHALATION)
Qty: 18 G | Refills: 2 | Status: SHIPPED | OUTPATIENT
Start: 2021-03-19 | End: 2021-11-11 | Stop reason: SDUPTHER

## 2021-03-19 RX ORDER — DILTIAZEM HYDROCHLORIDE 120 MG/1
120 CAPSULE, COATED, EXTENDED RELEASE ORAL DAILY
Qty: 90 CAPSULE | Refills: 3 | Status: SHIPPED | OUTPATIENT
Start: 2021-03-19 | End: 2021-06-03 | Stop reason: SDUPTHER

## 2021-03-19 NOTE — PROGRESS NOTES
Shweta Sayres  1940 03/19/21    SUBJECTIVE:      Pt has been more nervous, \"I feel like I could commit suicide if I really put my mind up to it. \" these current symptoms have been present for the last 2-3 weeks. He has been able to sleep. Pt stopped the singulair 3 days ago. Since doing so, he is more mellow,has not talked about suicide though he still has some obsessive behavior. He is less restless. He does have less agitation. He has a deviated septum. He complains of persistent nasal congestion and a small amount of rhinorrhea. He was seen by ENT, was told that he needed surgery. OBJECTIVE:    /70   Pulse 76   Resp 18   Wt 241 lb 6.4 oz (109.5 kg)   SpO2 93%   BMI 34.64 kg/m²     Physical Exam    ASSESSMENT:    1. Nasal congestion    2. Recurrent major depressive disorder, in partial remission ([address]    3. Type 2 diabetes, controlled, with neuropathy ([address]    4. Simple chronic bronchitis ([address]    5. Coronary artery disease involving coronary bypass graft of native heart without angina pectoris    6. Essential hypertension    7. Gastroesophageal reflux disease without esophagitis    8. Allergic rhinitis, unspecified seasonality, unspecified trigger        PLAN:    Jackie Abad was seen today for suicidal and discuss medications.     Diagnoses and all orders for this visit:    Nasal congestion - stop singulair, start flonase; will refer back to ENT  -     Amb External Referral To ENT    Recurrent major depressive disorder, in partial remission ([address] - Vernette Sours worsened with the singulair; much better off th med, no change inmgmt    Type 2 diabetes, controlled, with neuropathy (HCC)    Simple chronic bronchitis (HCC)  -     albuterol sulfate  (90 Base) MCG/ACT inhaler; INHALE TWO PUFFS BY MOUTH (INTO THE LUNGS) EVERY 6 HOURS AS NEEDED FOR WHEEZING    Coronary artery disease involving coronary bypass graft of native heart without angina pectoris  -     atorvastatin (LIPITOR) 40 MG tablet; Take 1 tablet by mouth daily  -     carvedilol (COREG) 25 MG tablet; Take 1 tablet by mouth 2 times daily    Essential hypertension  -     carvedilol (COREG) 25 MG tablet; Take 1 tablet by mouth 2 times daily  -     dilTIAZem (CARDIZEM CD) 120 MG extended release capsule; Take 1 capsule by mouth daily    Gastroesophageal reflux disease without esophagitis  -     esomeprazole (NEXIUM) 40 MG delayed release capsule; Take 1 capsule by mouth every morning (before breakfast)    Allergic rhinitis, unspecified seasonality, unspecified trigger  -     fluticasone (FLONASE) 50 MCG/ACT nasal spray; 2 sprays by Each Nostril route daily    Other orders  -     furosemide (LASIX) 40 MG tablet;  Take 2 tablets by mouth daily

## 2021-04-06 ENCOUNTER — OFFICE VISIT (OUTPATIENT)
Dept: CARDIOLOGY CLINIC | Age: 81
End: 2021-04-06
Payer: MEDICARE

## 2021-04-06 VITALS
BODY MASS INDEX: 35.34 KG/M2 | SYSTOLIC BLOOD PRESSURE: 130 MMHG | WEIGHT: 238.6 LBS | HEIGHT: 69 IN | DIASTOLIC BLOOD PRESSURE: 72 MMHG | HEART RATE: 80 BPM

## 2021-04-06 DIAGNOSIS — I25.10 CORONARY ARTERY DISEASE INVOLVING NATIVE CORONARY ARTERY OF NATIVE HEART WITHOUT ANGINA PECTORIS: Primary | ICD-10-CM

## 2021-04-06 DIAGNOSIS — E11.40 TYPE 2 DIABETES, CONTROLLED, WITH NEUROPATHY (HCC): ICD-10-CM

## 2021-04-06 DIAGNOSIS — L98.499 TYPE II DIABETES MELLITUS WITH ULCER (HCC): ICD-10-CM

## 2021-04-06 DIAGNOSIS — E78.00 HYPERCHOLESTEREMIA: ICD-10-CM

## 2021-04-06 DIAGNOSIS — Z95.1 S/P CABG X 3: ICD-10-CM

## 2021-04-06 DIAGNOSIS — I10 ESSENTIAL HYPERTENSION: ICD-10-CM

## 2021-04-06 DIAGNOSIS — I83.893 VARICOSE VEINS OF BOTH LEGS WITH EDEMA: ICD-10-CM

## 2021-04-06 DIAGNOSIS — E11.622 TYPE II DIABETES MELLITUS WITH ULCER (HCC): ICD-10-CM

## 2021-04-06 DIAGNOSIS — Z95.2 S/P AVR (AORTIC VALVE REPLACEMENT): ICD-10-CM

## 2021-04-06 DIAGNOSIS — I38 VHD (VALVULAR HEART DISEASE): ICD-10-CM

## 2021-04-06 PROCEDURE — 99214 OFFICE O/P EST MOD 30 MIN: CPT | Performed by: INTERNAL MEDICINE

## 2021-04-06 PROCEDURE — 1036F TOBACCO NON-USER: CPT | Performed by: INTERNAL MEDICINE

## 2021-04-06 PROCEDURE — 1123F ACP DISCUSS/DSCN MKR DOCD: CPT | Performed by: INTERNAL MEDICINE

## 2021-04-06 PROCEDURE — G8427 DOCREV CUR MEDS BY ELIG CLIN: HCPCS | Performed by: INTERNAL MEDICINE

## 2021-04-06 PROCEDURE — G8417 CALC BMI ABV UP PARAM F/U: HCPCS | Performed by: INTERNAL MEDICINE

## 2021-04-06 PROCEDURE — 4040F PNEUMOC VAC/ADMIN/RCVD: CPT | Performed by: INTERNAL MEDICINE

## 2021-04-06 NOTE — PROGRESS NOTES
applicator      glipiZIDE (GLUCOTROL) 5 MG tablet Take 1 tablet by mouth 2 times daily (before meals) 180 tablet 5    Coenzyme Q10 (COQ10) 400 MG CAPS Take by mouth every morning Over The Counter      Magnesium 500 MG CAPS Take by mouth every evening Over The Counter      aspirin 81 MG EC tablet Take 81 mg by mouth nightly Over The Counter       No current facility-administered medications for this visit. Allergies: Morphine  Review of Systems:    Constitutional: Negative for diaphoresis and fatigue  Respiratory: Negative for shortness of breath  Cardiovascular: Negative for chest pain, dyspnea on exertion, claudication, edema, irregular heartbeat, murmur, palpitations or shortness of breath  Musculoskeletal: Negative for muscle pain, muscular weakness, negative for pain in arm and leg or swelling in foot and leg    Objective:  /72 (Site: Left Upper Arm, Position: Sitting, Cuff Size: Medium Adult)   Pulse 80   Ht 5' 9\" (1.753 m)   Wt 238 lb 9.6 oz (108.2 kg)   BMI 35.24 kg/m²   Wt Readings from Last 3 Encounters:   04/06/21 238 lb 9.6 oz (108.2 kg)   03/19/21 241 lb 6.4 oz (109.5 kg)   01/18/21 238 lb (108 kg)     Body mass index is 35.24 kg/m². GENERAL - Alert, oriented, pleasant, in no apparent distress. EYES: No jaundice, no conjunctival pallor. Neck - Supple. No jugular venous distention noted. No carotid bruits. Cardiovascular  Normal S1 and S2 without obvious murmur or gallop. Extremities - No cyanosis, clubbing, or significant edema. Pulmonary  No respiratory distress. No wheezes or rales.       Lab Review   Lab Results   Component Value Date    TROPONINT <0.010 04/24/2020    TROPONINT <0.010 04/24/2020     Lab Results   Component Value Date    PROBNP 576 01/18/2021    PROBNP 196.2 04/24/2020     Lab Results   Component Value Date    INR 1.21 07/14/2016    INR 1.14 07/13/2016     Lab Results   Component Value Date    LABA1C 7.2 08/17/2020    LABA1C 8.2 (H) 04/03/2020     Lab Results   Component Value Date    WBC 7.4 01/18/2021    WBC 9.3 12/11/2020    HCT 35.6 (L) 01/18/2021    HCT 38.6 (L) 12/11/2020    MCV 88.9 01/18/2021    MCV 90.6 12/11/2020     01/18/2021     12/11/2020     Lab Results   Component Value Date    CHOL 118 08/17/2020    CHOL 126 04/03/2020    TRIG 134 08/17/2020    TRIG 168 (H) 04/03/2020    HDL 50 08/17/2020    HDL 47 04/03/2020    LDLCALC 41 08/17/2020    LDLCALC 47 03/27/2019     Lab Results   Component Value Date    ALT 9 (L) 01/18/2021    ALT 23 12/11/2020    AST 17 01/18/2021    AST 21 12/11/2020     BMP:    Lab Results   Component Value Date     01/18/2021     12/11/2020    K 4.3 01/18/2021    K 4.2 12/11/2020    CL 97 01/18/2021    CL 94 12/11/2020    CO2 31 01/18/2021    CO2 32 12/11/2020    BUN 10 01/18/2021    BUN 17 12/11/2020    CREATININE 0.7 01/18/2021    CREATININE 1.1 12/11/2020     CMP:   Lab Results   Component Value Date     01/18/2021     12/11/2020    K 4.3 01/18/2021    K 4.2 12/11/2020    CL 97 01/18/2021    CL 94 12/11/2020    CO2 31 01/18/2021    CO2 32 12/11/2020    BUN 10 01/18/2021    BUN 17 12/11/2020    CREATININE 0.7 01/18/2021    CREATININE 1.1 12/11/2020    PROT 7.1 01/18/2021    PROT 6.9 12/11/2020    PROT 7.3 09/25/2012     Lab Results   Component Value Date    TSH 1.56 09/02/2016     CARDIOLITE 5/2020    Normal perfusion study with normal distribution in all coronal, short, and    horizontal axis.    The observed defect is consistent with diaphragmatic attenuation.    Normal LV function. LVEF is 65 %     ECHO 1/2021   Technically difficult examination due to poor acoustic windows. Left ventricular systolic function is normal.   Ejection fraction is visually estimated at 50-55%. Bioprosthetic aortic valve appears to be functioning normally. Trace aortic regurgitation. No evidence of any pericardial effusion. QUALITY MEASURES REVIEWED:  1.CAD:Patient is taking anti platelet agent: Yes 2. DYSLIPIDEMIA: Patient is on cholesterol lowering medication:Yes   3. Beta-Blocker therapy for CAD, if prior Myocardial Infarction:Yes   4. Counselled regarding smoking cessation. No   Patient does not Smoke. 5.Anticoagulation therapy (for A.Fib) No   Does Not have A.Fib.  6.Discussed weight management strategies. Assessment & Plan:    Primary / Secondary prevention is the goal by aggressive risk modification, healthy and therapeutic life style changes for cardiovascular risk reduction. Various goals are discussed and multiple questions answered. CAD:Yes   clinically stable. Patient is on optimal medical regimen ( see medication list above )  -: Patient is currently  asymptomatic from CAD.          - changes in  treatment:   no           - Testing ordered:  no  [place] classification: 1  [place] RISK SCORE:  CARLO RISK SCORE:  HTN:well controlled on current medical regimen, see list above.              - changes in  treatment:   no   CARDIOMYOPATHY: None known   CONGESTIVE HEART FAILURE: NO KNOWN HISTORY.    VHD: S/P AVR  DYSLIPIDEMIA: Patient's profile is at / near [employer] current medical regimen wellyes,                                                              See most recent Lab values in Labs section above.   Diabetes mellitis: .yes,                                      [id number] by family MD                                     BS under good control No                                     Hgb A1c avilable yes,   PAD: None known. US shows triphasic wave form  OTHER RELEVANT DIAGNOSIS:as noted in patient's active problem list:  Leg edema: Doubt CHF, possible diastolic problem. Advised to restrict total fluid intake to 1.5 L per day.   Syncope: sounds like vaso vagal.   TESTS ORDERED: none this visit                                      All previously ordered tests reviewed.   ARRHYTHMIAS: None  known   MEDICATIONS: CPM. Wear stockings  OV in 3 months.

## 2021-04-06 NOTE — PATIENT INSTRUCTIONS
CAD:Yes   clinically stable. Patient is on optimal medical regimen ( see medication list above )  -: Patient is currently  asymptomatic from CAD.          - changes in  treatment:   no           - Testing ordered:  no  [place] classification: 1  [place] RISK SCORE:  CARLO RISK SCORE:  HTN:well controlled on current medical regimen, see list above.              - changes in  treatment:   no   CARDIOMYOPATHY: None known   CONGESTIVE HEART FAILURE: NO KNOWN HISTORY.    VHD: S/P AVR  DYSLIPIDEMIA: Patient's profile is at / near [employer] current medical regimen wellyes,                                                              See most recent Lab values in Labs section above.   Diabetes mellitis: .yes,                                      [id number] by family MD                                     BS under good control No                                     Hgb A1c avilable yes,   PAD: None known. US shows triphasic wave form  OTHER RELEVANT DIAGNOSIS:as noted in patient's active problem list:  Leg edema: Doubt CHF, possible diastolic problem. Advised to restrict total fluid intake to 1.5 L per day. Syncope: sounds like vaso vagal.   TESTS ORDERED: none this visit                                      All previously ordered tests reviewed.   ARRHYTHMIAS: None  known   MEDICATIONS: CPM. Wear stockings  OV in 3 months.

## 2021-04-06 NOTE — LETTER
[address]  [address] [address]  Phone: [phone]  Fax: [phone]    Zakia Siddiqui MD        April 6, 2021     Ella Hernandes MD  [facility] [address] [place] [address]    Patient: Sabrina Chou  MR Number: [id number]  YOB: 1940  Date of Visit: 4/6/2021    Dear Dr. Ella Hernandes:    Thank you for the request for consultation for Mariposa Bright to me for the evaluation of CAD / vhd. Below are the relevant portions of my assessment and plan of care. If you have questions, please do not hesitate to call me. I look forward to following [address] along with you.     Sincerely,        Zakia Siddiqui MD

## 2021-04-19 ENCOUNTER — OFFICE VISIT (OUTPATIENT)
Dept: INTERNAL MEDICINE CLINIC | Age: 81
End: 2021-04-19
Payer: MEDICARE

## 2021-04-19 VITALS
BODY MASS INDEX: 33.46 KG/M2 | OXYGEN SATURATION: 96 % | WEIGHT: 226.6 LBS | SYSTOLIC BLOOD PRESSURE: 134 MMHG | DIASTOLIC BLOOD PRESSURE: 68 MMHG | RESPIRATION RATE: 16 BRPM | HEART RATE: 70 BPM

## 2021-04-19 DIAGNOSIS — E11.40 TYPE 2 DIABETES, CONTROLLED, WITH NEUROPATHY (HCC): ICD-10-CM

## 2021-04-19 DIAGNOSIS — Z95.2 S/P AVR (AORTIC VALVE REPLACEMENT): ICD-10-CM

## 2021-04-19 DIAGNOSIS — E78.00 HYPERCHOLESTEREMIA: ICD-10-CM

## 2021-04-19 DIAGNOSIS — I10 ESSENTIAL HYPERTENSION: ICD-10-CM

## 2021-04-19 DIAGNOSIS — I25.810 CORONARY ARTERY DISEASE INVOLVING CORONARY BYPASS GRAFT OF NATIVE HEART WITHOUT ANGINA PECTORIS: ICD-10-CM

## 2021-04-19 DIAGNOSIS — E11.40 TYPE 2 DIABETES, CONTROLLED, WITH NEUROPATHY (HCC): Primary | ICD-10-CM

## 2021-04-19 LAB
A/G RATIO: 1.8 (ref 1.1–2.2)
ALBUMIN SERPL-MCNC: 4.6 G/DL (ref 3.4–5)
ALP BLD-CCNC: 97 U/L (ref 40–129)
ALT SERPL-CCNC: 17 U/L (ref 10–40)
ANION GAP SERPL CALCULATED.3IONS-SCNC: 10 MMOL/L (ref 3–16)
AST SERPL-CCNC: 19 U/L (ref 15–37)
BASOPHILS ABSOLUTE: 0.1 K/UL (ref 0–0.2)
BASOPHILS RELATIVE PERCENT: 0.9 %
BILIRUB SERPL-MCNC: 1.2 MG/DL (ref 0–1)
BUN BLDV-MCNC: 10 MG/DL (ref 7–20)
CALCIUM SERPL-MCNC: 9.4 MG/DL (ref 8.3–10.6)
CHLORIDE BLD-SCNC: 100 MMOL/L (ref 99–110)
CHOLESTEROL, TOTAL: 80 MG/DL (ref 0–199)
CO2: 32 MMOL/L (ref 21–32)
CREAT SERPL-MCNC: 1 MG/DL (ref 0.8–1.3)
EOSINOPHILS ABSOLUTE: 0.2 K/UL (ref 0–0.6)
EOSINOPHILS RELATIVE PERCENT: 2.1 %
GFR AFRICAN AMERICAN: >60
GFR NON-AFRICAN AMERICAN: >60
GLOBULIN: 2.5 G/DL
GLUCOSE BLD-MCNC: 88 MG/DL (ref 70–99)
HCT VFR BLD CALC: 39 % (ref 40.5–52.5)
HDLC SERPL-MCNC: 35 MG/DL (ref 40–60)
HEMOGLOBIN: 13.2 G/DL (ref 13.5–17.5)
LDL CHOLESTEROL CALCULATED: 25 MG/DL
LYMPHOCYTES ABSOLUTE: 2.4 K/UL (ref 1–5.1)
LYMPHOCYTES RELATIVE PERCENT: 26.4 %
MCH RBC QN AUTO: 29.2 PG (ref 26–34)
MCHC RBC AUTO-ENTMCNC: 33.8 G/DL (ref 31–36)
MCV RBC AUTO: 86.2 FL (ref 80–100)
MONOCYTES ABSOLUTE: 1 K/UL (ref 0–1.3)
MONOCYTES RELATIVE PERCENT: 10.9 %
NEUTROPHILS ABSOLUTE: 5.5 K/UL (ref 1.7–7.7)
NEUTROPHILS RELATIVE PERCENT: 59.7 %
PDW BLD-RTO: 17.5 % (ref 12.4–15.4)
PLATELET # BLD: 234 K/UL (ref 135–450)
PMV BLD AUTO: 7.7 FL (ref 5–10.5)
POTASSIUM SERPL-SCNC: 4.1 MMOL/L (ref 3.5–5.1)
RBC # BLD: 4.52 M/UL (ref 4.2–5.9)
SODIUM BLD-SCNC: 142 MMOL/L (ref 136–145)
TOTAL PROTEIN: 7.1 G/DL (ref 6.4–8.2)
TRIGL SERPL-MCNC: 100 MG/DL (ref 0–150)
VLDLC SERPL CALC-MCNC: 20 MG/DL
WBC # BLD: 9.2 K/UL (ref 4–11)

## 2021-04-19 PROCEDURE — 1036F TOBACCO NON-USER: CPT | Performed by: INTERNAL MEDICINE

## 2021-04-19 PROCEDURE — 36415 COLL VENOUS BLD VENIPUNCTURE: CPT | Performed by: INTERNAL MEDICINE

## 2021-04-19 PROCEDURE — 1123F ACP DISCUSS/DSCN MKR DOCD: CPT | Performed by: INTERNAL MEDICINE

## 2021-04-19 PROCEDURE — 4040F PNEUMOC VAC/ADMIN/RCVD: CPT | Performed by: INTERNAL MEDICINE

## 2021-04-19 PROCEDURE — G8427 DOCREV CUR MEDS BY ELIG CLIN: HCPCS | Performed by: INTERNAL MEDICINE

## 2021-04-19 PROCEDURE — G8417 CALC BMI ABV UP PARAM F/U: HCPCS | Performed by: INTERNAL MEDICINE

## 2021-04-19 PROCEDURE — 99214 OFFICE O/P EST MOD 30 MIN: CPT | Performed by: INTERNAL MEDICINE

## 2021-04-19 RX ORDER — FUROSEMIDE 40 MG/1
40 TABLET ORAL DAILY
Qty: 90 TABLET | Refills: 3 | Status: SHIPPED
Start: 2021-04-19 | End: 2021-08-25 | Stop reason: SDUPTHER

## 2021-04-19 NOTE — PROGRESS NOTES
Kim Benavides  1940    SUBJECTIVE:    Pts son  suddenly 2 weeks ago. He has been in mourning, but feels that he is slowly improving. Pt had been retaining water so was started on a higher does of lasix in . Edema is improved. Echo showed bioprosthetic valve., EF 50-55%. Patient compliant with medications for diabetes. Patient has had no episodes of significant hypoglycemia. Patient denies any chest pain, shortness of breath, myalgias, Patient is tolerating cholesterol medications without difficulty. The patient is taking hypertensive medications compliantly without side effects. Denies chest pain, dyspnea, edema, or TIA's. OBJECTIVE:    /68   Pulse 70   Resp 16   Wt 226 lb 9.6 oz (102.8 kg)   SpO2 96%   BMI 33.46 kg/m²     Physical Exam  Constitutional:       Appearance: He is well-developed. Eyes:      General: No scleral icterus. Conjunctiva/sclera: Conjunctivae normal.   Neck:      Musculoskeletal: Neck supple. Thyroid: No thyromegaly. Vascular: No JVD. Trachea: No tracheal deviation. Cardiovascular:      Rate and Rhythm: Normal rate and regular rhythm. Heart sounds: Normal heart sounds. Pulmonary:      Effort: Pulmonary effort is normal. No respiratory distress. Breath sounds: Normal breath sounds. Abdominal:      General: There is no distension. Palpations: Abdomen is soft. There is no mass. Tenderness: There is no abdominal tenderness. There is no guarding or rebound. Musculoskeletal:         General: Swelling (1+ edema to distal lower legs bilat) present. Lymphadenopathy:      Cervical: No cervical adenopathy. Skin:     Nails: There is no clubbing. Neurological:      Mental Status: He is alert and oriented to person, place, and time. Comments: Nonfocal   Psychiatric:         Behavior: Behavior normal.         Judgment: Judgment normal.         ASSESSMENT:    1.  Type 2 diabetes, controlled, with neuropathy ([facility]    2. Hypercholesteremia    3. Essential hypertension    4. Coronary artery disease involving coronary bypass graft of native heart without angina pectoris    5. S/P AVR (aortic valve replacement)        PLAN:    Isabel Murillo was seen today for 1 month follow-up and referral - general.    Diagnoses and all orders for this visit:    Type 2 diabetes, controlled, with neuropathy ([facility]  -     Hemoglobin A1C; Future  -     Lipid Panel; Future  -     CBC Auto Differential; Future  -     Comprehensive Metabolic Panel; Future  -     External Referral To Cardiology    Hypercholesteremia - check labs, cont statin  -     Hemoglobin A1C; Future  -     Lipid Panel; Future  -     CBC Auto Differential; Future  -     Comprehensive Metabolic Panel; Future  -     External Referral To Cardiology    Essential hypertension - BP at goal, no change  -     Hemoglobin A1C; Future  -     Lipid Panel; Future  -     CBC Auto Differential; Future  -     Comprehensive Metabolic Panel; Future  -     External Referral To Cardiology    Coronary artery disease involving coronary bypass graft of native heart without angina pectoris - pt with CAD, valve replacement, and perhaps diastolic dysfxn. Edema much improved currently. Will refer to cardiology  -     External Referral To Cardiology    S/P AVR (aortic valve replacement)  -     External Referral To Cardiology    Other orders  -     furosemide (LASIX) 40 MG tablet; Take 1 tablet by mouth daily    offered referral for counseling, pt declines.

## 2021-04-20 LAB
ESTIMATED AVERAGE GLUCOSE: 139.9 MG/DL
HBA1C MFR BLD: 6.5 %

## 2021-04-26 ENCOUNTER — TELEPHONE (OUTPATIENT)
Dept: INTERNAL MEDICINE CLINIC | Age: 81
End: 2021-04-26

## 2021-04-26 NOTE — TELEPHONE ENCOUNTER
Spoke with Nima Perrin verbal ok . Visit Information Date & Time Provider Department Dept. Phone Encounter #  
 7/20/2017  9:30 AM Nayeli Grove [facility] [phone] [id number] Follow-up Instructions Return in about 3 months (around 10/20/2017), or if symptoms worsen or fail to improve. Upcoming Health Maintenance Date Due PEDIATRIC DENTIST REFERRAL 1/16/2017 Varicella Peds Age 1-18 (1 of 2 - 2 Dose Childhood Series) 7/16/2017 Hepatitis A Peds Age 1-18 (1 of 2 - Standard Series) 7/16/2017 Hib Peds Age 0-5 (4 of 4 - Standard Series) 7/16/2017 MMR Peds Age 1-18 (1 of 2) 7/16/2017 PCV Peds Age 0-5 (4 of 4 - Standard Series) 7/16/2017 INFLUENZA PEDS 6M-8Y (1 of 2) 8/1/2017 DTaP/Tdap/Td series (4 - DTaP) 10/16/2017 IPV Peds Age 0-18 (4 of 4 - All-IPV Series) 7/16/2020 MCV through Age 25 (1 of 2) 7/16/2027 Allergies as of 7/20/2017  Review Complete On: 7/20/2017 By: Nayeli Grove MD  
 No Known Allergies Current Immunizations  Reviewed on 7/14/2017 Name Date TDdV-Umv-CRE 1/26/2017, 2016, 2016 Hep B, Adol/Ped 1/26/2017, 2016, 2016  5:45 AM  
 Pneumococcal Conjugate (PCV-13) 1/26/2017, 2016, 2016 Rotavirus, Live, Monovalent Vaccine 2016, 2016 Not reviewed this visit You Were Diagnosed With   
  
 Codes Comments Encounter for routine child health examination with abnormal findings    -  Primary ICD-10-CM: Z00.121 ICD-9-CM: V20.2 OME (otitis media with effusion), right     ICD-10-CM: H65.91 
ICD-9-CM: 381.4 Nasal congestion     ICD-10-CM: R09.81 ICD-9-CM: 478.19 Vitals Temp Height(growth percentile) Weight(growth percentile) HC BMI  
 97.6 °F (36.4 °C) (Axillary) 2' 5.5\" (0.749 m) (62 %, Z= 0.29)* 22 lb 5 oz (10.1 kg) (83 %, Z= 0.97)* 46 cm (78 %, Z= 0.78)* 18.03 kg/m2 *Growth percentiles are based on WHO (Girls, 0-2 years) data. BSA Data Body Surface Area  
 0.46 m 2 Preferred Pharmacy Pharmacy Name Phone [id number] [phone] Your Updated Medication List  
  
   
This list is accurate as of: 7/20/17 10:14 AM.  Always use your most recent med list.  
  
  
  
  
 acetaminophen 160 mg/5 mL suspension Commonly known as:  INFANT'S TYLENOL Take 3.5 mL by mouth every four (4) hours as needed for Fever. amoxicillin-clavulanate 600-42.9 mg/5 mL suspension Commonly known as:  AUGMENTIN ES-600 Take 3.5 mL by mouth two (2) times a day for 10 days. cetirizine 5 mg/5 mL solution Commonly known as:  ZYRTEC Take 2.5 mg by mouth daily. hydrocortisone 0.5 % topical cream  
Commonly known as:  CORTAID Apply  to affected area two (2) times a day. ibuprofen 100 mg/5 mL suspension Commonly known as:  Cliffton Perks Take 5 mL by mouth four (4) times daily as needed for Fever. Prescriptions Sent to Pharmacy Refills  
 amoxicillin-clavulanate (AUGMENTIN ES-600) 600-42.9 mg/5 mL suspension 0 Sig: Take 3.5 mL by mouth two (2) times a day for 10 days. Class: Normal  
 Pharmacy: [id number] [address]  #: [phone] Route: Oral  
  
Follow-up Instructions Return in about 3 months (around 10/20/2017), or if symptoms worsen or fail to improve. Patient Instructions Child's Well Visit, 12 Months: Care Instructions Your Care Instructions Your baby may start showing his or her own personality at 12 months. He or she may show interest in the world around him or her. At this age, your baby may be ready to walk while holding on to furniture. Pat-a-cake and peekaboo are common games your baby may enjoy. He or she may point with fingers and look for hidden objects. Your baby may say 1 to 3 words and feed himself or herself. Follow-up care is a key part of your child's treatment and safety. Be sure to make and go to all appointments, and call your doctor if your child is having problems. It's also a good idea to know your child's test results and keep a list of the medicines your child takes. How can you care for your child at home? Feeding · Keep breastfeeding as long as it works for you and your baby. · Give your child whole cow's milk or full-fat soy milk. Your child can drink nonfat or low-fat milk at age 3. If your child age 3 to 2 years has a family history of heart disease or obesity, reduced-fat (2%) soy or cow's milk may be okay. Ask your doctor what is best for your child. · Cut or grind your child's food into small pieces. · Offer soft, well-cooked vegetables. Your child can also try casseroles, macaroni and cheese, spaghetti, yogurt, cheese, and rice. · Let your child decide how much to eat. · Encourage your child to drink from a cup. Water and milk are best. Juice does not have the valuable fiber that whole fruit has. If you must give your child juice, limit it to 4 to 6 ounces a day. · Offer many types of healthy foods each day. These include fruits, well-cooked vegetables, low-sugar cereal, yogurt, cheese, whole-grain breads and crackers, lean meat, fish, and tofu. Safety · Watch your child at all times when he or she is near water. Be careful around pools, hot tubs, buckets, bathtubs, toilets, and lakes. Swimming pools should be fenced on all sides and have a self-latching gate. · For every ride in a car, secure your child into a properly installed car seat that meets all current safety standards. For questions about car seats, call the [employer] at 2-872.448.6072. · To prevent choking, do not let your child eat while he or she is walking around. Make sure your child sits down to eat.  Do not let your child play with toys that have buttons, marbles, coins, balloons, or small parts that can be removed. Do not give your child foods that may cause choking. These include nuts, whole grapes, hard or sticky candy, and popcorn. · Keep drapery cords and electrical cords out of your child's reach. · If your child can't breathe or cry, he or she is probably choking. Call 911 right away. Then follow the 's instructions. · Do not use walkers. They can easily tip over and lead to serious injury. · Use sliding ramesh at both ends of stairs. Do not use accordion-style ramesh, because a child's head could get caught. Look for a gate with openings no bigger than 2 3/8 inches. · Keep the Poison Control number (3-158.246.8785) in or near your phone. · Help your child brush his or her teeth every day. For children this age, use a tiny amount of toothpaste with fluoride (the size of a grain of rice). Immunizations · By now, your baby should have started a series of immunizations for illnesses such as whooping cough and diphtheria. It may be time to get other vaccines, such as chickenpox. Make sure that your baby gets all the recommended childhood vaccines. This will help keep your baby healthy and prevent the spread of disease. When should you call for help? Watch closely for changes in your child's health, and be sure to contact your doctor if: 
· You are concerned that your child is not growing or developing normally. · You are worried about your child's behavior. · You need more information about how to care for your child, or you have questions or concerns. Where can you learn more? Go to http://brianna-jayme.info/. Enter I834 in the search box to learn more about \"Child's Well Visit, 12 Months: Care Instructions. \" Current as of: May 4, 2017 Content Version: 11.3 © 7814-0417 [employer].  Care instructions adapted under license by Angeles Alcaraz (which disclaims liability or warranty for this information). If you have questions about a medical condition or this instruction, always ask your healthcare professional. [address] any warranty or liability for your use of this information. Introducing [facility] & HEALTH SERVICES! Dear Parent or Guardian, Thank you for requesting a [employer] account for your child. With [employer], you can view your childs hospital or ER discharge instructions, current allergies, immunizations and much more. In order to access your childs information, we require a signed consent on file. Please see the [facility] department or call [phone] for instructions on completing a [employer] Proxy request.   
Additional Information If you have questions, please visit the Frequently Asked Questions section of the [email or web address] website at [email or web address] Remember, [employer] is NOT to be used for urgent needs. For medical emergencies, dial 911. Now available from your iPhone and Android! Please provide this summary of care documentation to your next provider. Your primary care clinician is listed as CED Cervantes. If you have any questions after today's visit, please call [phone].

## 2021-05-10 DIAGNOSIS — I25.810 CORONARY ARTERY DISEASE INVOLVING CORONARY BYPASS GRAFT OF NATIVE HEART WITHOUT ANGINA PECTORIS: ICD-10-CM

## 2021-05-10 RX ORDER — ATORVASTATIN CALCIUM 40 MG/1
40 TABLET, FILM COATED ORAL DAILY
Qty: 90 TABLET | Refills: 3 | Status: SHIPPED | OUTPATIENT
Start: 2021-05-10

## 2021-06-03 DIAGNOSIS — I10 ESSENTIAL HYPERTENSION: ICD-10-CM

## 2021-06-03 RX ORDER — DILTIAZEM HYDROCHLORIDE 120 MG/1
120 CAPSULE, COATED, EXTENDED RELEASE ORAL DAILY
Qty: 90 CAPSULE | Refills: 3 | Status: SHIPPED | OUTPATIENT
Start: 2021-06-03

## 2021-07-21 ENCOUNTER — OFFICE VISIT (OUTPATIENT)
Dept: INTERNAL MEDICINE CLINIC | Age: 81
End: 2021-07-21
Payer: MEDICARE

## 2021-07-21 VITALS
WEIGHT: 223 LBS | HEART RATE: 72 BPM | BODY MASS INDEX: 32.93 KG/M2 | RESPIRATION RATE: 16 BRPM | OXYGEN SATURATION: 96 % | DIASTOLIC BLOOD PRESSURE: 62 MMHG | SYSTOLIC BLOOD PRESSURE: 130 MMHG

## 2021-07-21 DIAGNOSIS — F32.1 CURRENT MODERATE EPISODE OF MAJOR DEPRESSIVE DISORDER, UNSPECIFIED WHETHER RECURRENT (HCC): Primary | ICD-10-CM

## 2021-07-21 PROCEDURE — 1036F TOBACCO NON-USER: CPT | Performed by: INTERNAL MEDICINE

## 2021-07-21 PROCEDURE — 1123F ACP DISCUSS/DSCN MKR DOCD: CPT | Performed by: INTERNAL MEDICINE

## 2021-07-21 PROCEDURE — G8417 CALC BMI ABV UP PARAM F/U: HCPCS | Performed by: INTERNAL MEDICINE

## 2021-07-21 PROCEDURE — 4040F PNEUMOC VAC/ADMIN/RCVD: CPT | Performed by: INTERNAL MEDICINE

## 2021-07-21 PROCEDURE — 99213 OFFICE O/P EST LOW 20 MIN: CPT | Performed by: INTERNAL MEDICINE

## 2021-07-21 PROCEDURE — G8427 DOCREV CUR MEDS BY ELIG CLIN: HCPCS | Performed by: INTERNAL MEDICINE

## 2021-07-21 RX ORDER — ESCITALOPRAM OXALATE 5 MG/1
5 TABLET ORAL DAILY
Qty: 30 TABLET | Refills: 5 | Status: SHIPPED | OUTPATIENT
Start: 2021-07-21 | End: 2021-08-25

## 2021-07-21 RX ORDER — FAMOTIDINE 20 MG/1
20 TABLET, FILM COATED ORAL 2 TIMES DAILY
COMMUNITY
End: 2021-07-21 | Stop reason: CLARIF

## 2021-07-21 NOTE — PROGRESS NOTES
Neva Dayan  1940 07/21/21    SUBJECTIVE:    Pt wants to die. He is feeling sad, \"I need something to perk me up. \" covid and his son dying has been very difficult. Appetite is less, but he has started to drink ensure. He did not tolerate cymbalta in the past.     OBJECTIVE:    /62   Pulse 72   Resp 16   Wt 223 lb (101.2 kg)   SpO2 96%   BMI 32.93 kg/m²     Physical Exam  Psychiatric:         Attention and Perception: Attention and perception normal.         Mood and Affect: Mood is depressed. Affect is blunt. Speech: Speech normal.         Behavior: Behavior normal. Behavior is cooperative. Thought Content: Thought content normal.         Cognition and Memory: Cognition normal.         Judgment: Judgment normal.         ASSESSMENT:    1. Current moderate episode of major depressive disorder, unspecified whether recurrent ([address]        PLAN:    Naida White was seen today for 3 month follow-up, depression and medication refill. Diagnoses and all orders for this visit:    Current moderate episode of major depressive disorder, unspecified whether recurrent ([address] - discussed options. Encourage pt to participate in more activities. Will start lexapro 2.5 titrate to 5mg daily    Other orders  -     escitalopram (LEXAPRO) 5 MG tablet;  Take 1 tablet by mouth daily

## 2021-08-11 DIAGNOSIS — I10 ESSENTIAL HYPERTENSION: ICD-10-CM

## 2021-08-11 DIAGNOSIS — I25.810 CORONARY ARTERY DISEASE INVOLVING CORONARY BYPASS GRAFT OF NATIVE HEART WITHOUT ANGINA PECTORIS: ICD-10-CM

## 2021-08-11 RX ORDER — CARVEDILOL 25 MG/1
25 TABLET ORAL 2 TIMES DAILY
Qty: 180 TABLET | Refills: 3 | Status: SHIPPED | OUTPATIENT
Start: 2021-08-11

## 2021-08-16 ENCOUNTER — TELEPHONE (OUTPATIENT)
Dept: OTHER | Facility: CLINIC | Age: 81
End: 2021-08-16

## 2021-08-17 ENCOUNTER — TELEPHONE (OUTPATIENT)
Dept: OTHER | Facility: CLINIC | Age: 81
End: 2021-08-17

## 2021-08-25 ENCOUNTER — OFFICE VISIT (OUTPATIENT)
Dept: INTERNAL MEDICINE CLINIC | Age: 81
End: 2021-08-25
Payer: MEDICARE

## 2021-08-25 VITALS
WEIGHT: 222 LBS | DIASTOLIC BLOOD PRESSURE: 64 MMHG | OXYGEN SATURATION: 95 % | BODY MASS INDEX: 32.78 KG/M2 | HEART RATE: 80 BPM | RESPIRATION RATE: 16 BRPM | SYSTOLIC BLOOD PRESSURE: 126 MMHG

## 2021-08-25 DIAGNOSIS — R60.0 LOWER EXTREMITY EDEMA: Primary | ICD-10-CM

## 2021-08-25 DIAGNOSIS — I25.810 CORONARY ARTERY DISEASE INVOLVING CORONARY BYPASS GRAFT OF NATIVE HEART WITHOUT ANGINA PECTORIS: ICD-10-CM

## 2021-08-25 PROCEDURE — 4040F PNEUMOC VAC/ADMIN/RCVD: CPT | Performed by: INTERNAL MEDICINE

## 2021-08-25 PROCEDURE — 1036F TOBACCO NON-USER: CPT | Performed by: INTERNAL MEDICINE

## 2021-08-25 PROCEDURE — G8427 DOCREV CUR MEDS BY ELIG CLIN: HCPCS | Performed by: INTERNAL MEDICINE

## 2021-08-25 PROCEDURE — G8417 CALC BMI ABV UP PARAM F/U: HCPCS | Performed by: INTERNAL MEDICINE

## 2021-08-25 PROCEDURE — 99213 OFFICE O/P EST LOW 20 MIN: CPT | Performed by: INTERNAL MEDICINE

## 2021-08-25 PROCEDURE — 1123F ACP DISCUSS/DSCN MKR DOCD: CPT | Performed by: INTERNAL MEDICINE

## 2021-08-25 RX ORDER — FUROSEMIDE 80 MG
80 TABLET ORAL DAILY
Qty: 90 TABLET | Refills: 3 | Status: SHIPPED | OUTPATIENT
Start: 2021-08-25

## 2021-08-25 NOTE — PROGRESS NOTES
Vinod Colby  1940 08/25/21    SUBJECTIVE:    Pt continues to have swelling in his feet. He is not eating salt. He is not getting much ambulation - he sits most of the time, though he tries to elevate his legs. He is on lasix. He continues to struggle with his mood since the death of his son. lexapro provided no benefit. OBJECTIVE:    /64   Pulse 80   Resp 16   Wt 222 lb (100.7 kg)   SpO2 95%   BMI 32.78 kg/m²     Physical Exam  2+ edema to mid calf. ASSESSMENT:    1. Lower extremity edema    2. Coronary artery disease involving coronary bypass graft of native heart without angina pectoris        PLAN:    Hero Vilchis was seen today for 1 month follow-up and edema. Diagnoses and all orders for this visit:    Lower extremity edema - will increase the lasix to 80mg daily, check labs 1 week. Encourage increased ambulation  -     Basic Metabolic Panel; Future  -     furosemide (LASIX) 80 MG tablet;  Take 1 tablet by mouth daily    Coronary artery disease involving coronary bypass graft of native heart without angina pectoris - no current symptoms no change in mgmt    Pt adamantly refuses covid vaccine

## 2021-08-30 DIAGNOSIS — R60.0 LOWER EXTREMITY EDEMA: ICD-10-CM

## 2021-08-30 LAB
ANION GAP SERPL CALCULATED.3IONS-SCNC: 13 MMOL/L (ref 3–16)
BUN BLDV-MCNC: 6 MG/DL (ref 7–20)
CALCIUM SERPL-MCNC: 9.2 MG/DL (ref 8.3–10.6)
CHLORIDE BLD-SCNC: 93 MMOL/L (ref 99–110)
CO2: 30 MMOL/L (ref 21–32)
CREAT SERPL-MCNC: 0.8 MG/DL (ref 0.8–1.3)
GFR AFRICAN AMERICAN: >60
GFR NON-AFRICAN AMERICAN: >60
GLUCOSE BLD-MCNC: 132 MG/DL (ref 70–99)
POTASSIUM SERPL-SCNC: 3.8 MMOL/L (ref 3.5–5.1)
SODIUM BLD-SCNC: 136 MMOL/L (ref 136–145)

## 2021-08-30 PROCEDURE — 36415 COLL VENOUS BLD VENIPUNCTURE: CPT | Performed by: INTERNAL MEDICINE

## 2021-09-07 DIAGNOSIS — E11.40 TYPE 2 DIABETES, CONTROLLED, WITH NEUROPATHY (HCC): ICD-10-CM

## 2021-09-07 RX ORDER — GLIPIZIDE 5 MG/1
TABLET ORAL
Qty: 180 TABLET | Refills: 0 | Status: SHIPPED | OUTPATIENT
Start: 2021-09-07

## 2021-09-15 ENCOUNTER — TELEPHONE (OUTPATIENT)
Dept: INTERNAL MEDICINE CLINIC | Age: 81
End: 2021-09-15

## 2021-09-15 NOTE — TELEPHONE ENCOUNTER
----- Message from Ted Olea sent at 9/15/2021  1:29 PM EDT -----  Subject: Message to Provider    QUESTIONS  Information for Provider? pt wife Mae Early is giving on an update on pt   stating that pt is having diarrhea no matter what pt eats. ---------------------------------------------------------------------------  --------------  Betty LITTLE  What is the best way for the office to contact you? OK to leave message on   voicemail  Preferred Call Back Phone Number? [phone]  ---------------------------------------------------------------------------  --------------  SCRIPT ANSWERS  Relationship to Patient? Other  Representative Name? Mae Early  Is the Representative on the appropriate HIPAA document in Epic?  Yes

## 2021-09-16 ENCOUNTER — VIRTUAL VISIT (OUTPATIENT)
Dept: INTERNAL MEDICINE CLINIC | Age: 81
End: 2021-09-16
Payer: MEDICARE

## 2021-09-16 DIAGNOSIS — R19.7 DIARRHEA, UNSPECIFIED TYPE: Primary | ICD-10-CM

## 2021-09-16 PROCEDURE — 99441 PR PHYS/QHP TELEPHONE EVALUATION 5-10 MIN: CPT | Performed by: NURSE PRACTITIONER

## 2021-09-16 RX ORDER — AZITHROMYCIN 500 MG/1
500 TABLET, FILM COATED ORAL DAILY
Qty: 3 TABLET | Refills: 0 | Status: SHIPPED | OUTPATIENT
Start: 2021-09-16 | End: 2021-09-19

## 2021-09-16 NOTE — PROGRESS NOTES
Neva Hanley is a 79 y.o. male evaluated via telephone on 9/16/2021. Consent:  He and/or health care decision maker is aware that that he may receive a bill for this telephone service, depending on his insurance coverage, and has provided verbal consent to proceed: Yes    Mr Gianna Quesada is a current patient of Dr Vimal Arias who presents via phone call visit this morning for c/o issues with recent diarrhea. States that over the last 4-5 days. States he has also had chills, but no measurable fevers, cough, HA or URI symptoms. Diarrhea has been soft, brown, liquid stools, but denies any blood in stools or black stools. Wife recently purchased Immodium, but has not used too. Documentation:  I communicated with the patient and/or health care decision maker about (see above). Details of this discussion including any medical advice provided:     PLAN:  -limited exam via phone call and symptoms slowly improving; Seems to be most concerning for possible gastroenteritis; will start 3 day cycle of Azith 500 mg, Loperamide PRN, and advance diet as tolerated. I affirm this is a Patient Initiated Episode with a Patient who has not had a related appointment within my department in the past 7 days or scheduled within the next 24 hours. Patient identification was verified at the start of the visit: Yes    Total Time: minutes: 5-10 minutes    The visit was conducted pursuant to the emergency declaration under the [address] [place] [address], [address] [place] [address] authority and the [employer] Act. Patient identification was verified, and a caregiver was present when appropriate. The patient was located in a state where the provider was credentialed to provide care.     Note: not billable if this call serves to triage the patient into an appointment for the relevant concern      Fili Burroughs, APRN - CNP

## 2021-09-20 ENCOUNTER — TELEPHONE (OUTPATIENT)
Dept: INTERNAL MEDICINE CLINIC | Age: 81
End: 2021-09-20

## 2021-09-20 NOTE — TELEPHONE ENCOUNTER
Patient had a telephone visit with Maria Isabel Terrell CNP on Thurs. 09/16/21. Patient still has watery diarrhea and edema in his legs/ feet are so bad he can no longer wear his shoes. Schedule patient for a VV tomorrow. Is there anything the patient should do in the meantime. Please advise. Thank you!

## 2021-09-21 ENCOUNTER — VIRTUAL VISIT (OUTPATIENT)
Dept: INTERNAL MEDICINE CLINIC | Age: 81
End: 2021-09-21
Payer: MEDICARE

## 2021-09-21 DIAGNOSIS — R63.0 DECREASED APPETITE: Primary | ICD-10-CM

## 2021-09-21 PROCEDURE — 99442 PR PHYS/QHP TELEPHONE EVALUATION 11-20 MIN: CPT | Performed by: INTERNAL MEDICINE

## 2021-09-21 RX ORDER — MIRTAZAPINE 7.5 MG/1
7.5 TABLET, FILM COATED ORAL NIGHTLY
Qty: 30 TABLET | Refills: 5 | Status: SHIPPED | OUTPATIENT
Start: 2021-09-21 | End: 2021-10-25

## 2021-09-21 NOTE — PROGRESS NOTES
Nhung Tapia is a 79 y.o. male evaluated via telephone on 9/21/2021. Consent:  He and/or health care decision maker is aware that that he may receive a bill for this telephone service, depending on his insurance coverage, and has provided verbal consent to proceed: Yes      Documentation:  I communicated with the patient and/or health care decision maker about edema. Details of this discussion including any medical advice provided:     Pt continues to have lower extremity edema. Pt continues to use imodium, but despite this he has some diarrhea. He continues to be unsteady with ambulation. He has a cane but he is not using this. Appetite is slowly worsening. PLAN:  - use imodium daily   - sleep in bed   - use the cane   - remeron low dose - discussed at length with pts wife    - pt wants to die - may need to pursue hospice    I affirm this is a Patient Initiated Episode with a Patient who has not had a related appointment within my department in the past 7 days or scheduled within the next 24 hours. Patient identification was verified at the start of the visit: Yes    Total Time: minutes: 11-20 minutes    The visit was conducted pursuant to the emergency declaration under the [facility], [address] [place] [address] authority and the [employer] and [employer] Act. Patient identification was verified, and a caregiver was present when appropriate. The patient was located in a state where the provider was credentialed to provide care.     Note: not billable if this call serves to triage the patient into an appointment for the relevant concern      Latha Richards MD
No respiratory distress. No stridor, Lungs sounds clear with good aeration bilaterally. Persistent cough.

## 2021-10-12 ENCOUNTER — NURSE TRIAGE (OUTPATIENT)
Dept: OTHER | Facility: CLINIC | Age: 81
End: 2021-10-12

## 2021-10-12 ENCOUNTER — TELEPHONE (OUTPATIENT)
Dept: INTERNAL MEDICINE CLINIC | Age: 81
End: 2021-10-12

## 2021-10-12 NOTE — TELEPHONE ENCOUNTER
Received call from [place] at [facility] with Red Flag Complaint. Brief description of triage: Chest injury from a fall in the bathroom with severe chest pain    Triage indicates for patient to go to ED now. Care advice provided, patient verbalizes understanding; denies any other questions or concerns; instructed to call back for any new or worsening symptoms. Attention Provider: Thank you for allowing me to participate in the care of your patient. The patient was connected to triage in response to information provided to the ECC/PSC. Please do not respond through this encounter as the response is not directed to a shared pool. Reason for Disposition   SEVERE chest pain    Answer Assessment - Initial Assessment Questions  1. MECHANISM: \"How did the injury happen? \"      A fall in the bathroom, fell forward and hit the tub with his chest, injuring some ribs and sternum      2. ONSET: \"When did the injury happen? \" (Minutes or hours ago)      Wednesday    3. LOCATION: \"Where on the chest is the injury located? \"      Chest    4. APPEARANCE: \"What does the injury look like? \"      No marks at all    5. BLEEDING: \"Is there any bleeding now? If so, ask: How long has it been bleeding? \"      No    6. SEVERITY: Ahsley Emily difficulty with breathing? \"      No    7. SIZE: For cuts, bruises, or swelling, ask: \"How large is it? \" (e.g., inches or centimeters)      N/a    8. PAIN: \"Is there pain? \" If so, ask: \"How bad is the pain? \"   (e.g., Scale 1-10; or mild, moderate, severe)      Yes, 8 Severe, sleep in a chair, on his back but sleeps fine. 9. TETANUS: For any breaks in the skin, ask: \"When was the last tetanus booster? \"      N/a    10. PREGNANCY: \"Is there any chance you are pregnant? \" \"When was your last menstrual period? \"        N/a    Protocols used: CHEST INJURY-ADULT-[place]

## 2021-10-12 NOTE — TELEPHONE ENCOUNTER
----- Message from Azael Price sent at 10/12/2021  4:02 PM EDT -----  Subject: Message to Provider    QUESTIONS  Information for Provider? Spoke with patient's wife Angy Zhu, she states   that her /patient is refusing to go and have his chest x-ray   performed. She wanted his provider to be aware of his decision not to have   this done.   ---------------------------------------------------------------------------  --------------  [employer]  What is the best way for the office to contact you? OK to leave message on   voicemail  Preferred Call Back Phone Number? [phone]  ---------------------------------------------------------------------------  --------------  SCRIPT ANSWERS  Relationship to Patient? Third Party  Representative Name?  Marlys/ wife

## 2021-10-25 ENCOUNTER — OFFICE VISIT (OUTPATIENT)
Dept: INTERNAL MEDICINE CLINIC | Age: 81
End: 2021-10-25
Payer: MEDICARE

## 2021-10-25 VITALS
WEIGHT: 217 LBS | BODY MASS INDEX: 32.05 KG/M2 | RESPIRATION RATE: 18 BRPM | DIASTOLIC BLOOD PRESSURE: 62 MMHG | HEART RATE: 81 BPM | SYSTOLIC BLOOD PRESSURE: 116 MMHG | OXYGEN SATURATION: 94 %

## 2021-10-25 DIAGNOSIS — R63.0 DECREASED APPETITE: ICD-10-CM

## 2021-10-25 PROCEDURE — 1036F TOBACCO NON-USER: CPT | Performed by: INTERNAL MEDICINE

## 2021-10-25 PROCEDURE — 99213 OFFICE O/P EST LOW 20 MIN: CPT | Performed by: INTERNAL MEDICINE

## 2021-10-25 PROCEDURE — G8484 FLU IMMUNIZE NO ADMIN: HCPCS | Performed by: INTERNAL MEDICINE

## 2021-10-25 PROCEDURE — G8417 CALC BMI ABV UP PARAM F/U: HCPCS | Performed by: INTERNAL MEDICINE

## 2021-10-25 PROCEDURE — 1123F ACP DISCUSS/DSCN MKR DOCD: CPT | Performed by: INTERNAL MEDICINE

## 2021-10-25 PROCEDURE — 4040F PNEUMOC VAC/ADMIN/RCVD: CPT | Performed by: INTERNAL MEDICINE

## 2021-10-25 PROCEDURE — G8427 DOCREV CUR MEDS BY ELIG CLIN: HCPCS | Performed by: INTERNAL MEDICINE

## 2021-10-25 RX ORDER — MIRTAZAPINE 15 MG/1
15 TABLET, FILM COATED ORAL NIGHTLY
Qty: 30 TABLET | Refills: 5 | Status: SHIPPED | OUTPATIENT
Start: 2021-10-25

## 2021-10-25 NOTE — PROGRESS NOTES
Lester Ta  1940  10/25/21    SUBJECTIVE:    Pt is not eating, but has not been gaining weight. He is having more edema. The increase in lasix did not provide much benefit. OBJECTIVE:    /62   Pulse 81   Resp 18   Wt 217 lb (98.4 kg)   SpO2 94%   BMI 32.05 kg/m²     Physical Exam  3+ edema to mid calves  ASSESSMENT:    1. Decreased appetite        PLAN:    Diya Flores was seen today for leg swelling. Diagnoses and all orders for this visit:    Decreased appetite - will try a higher dose of remeron with the hope this helps mood and appetite. Encourage increased activity  -     mirtazapine (REMERON) 15 MG tablet;  Take 1 tablet by mouth nightly

## 2021-11-08 ENCOUNTER — VIRTUAL VISIT (OUTPATIENT)
Dept: INTERNAL MEDICINE CLINIC | Age: 81
End: 2021-11-08
Payer: MEDICARE

## 2021-11-08 DIAGNOSIS — R05.9 COUGH: Primary | ICD-10-CM

## 2021-11-08 PROCEDURE — 99442 PR PHYS/QHP TELEPHONE EVALUATION 11-20 MIN: CPT | Performed by: INTERNAL MEDICINE

## 2021-11-08 RX ORDER — AZITHROMYCIN 250 MG/1
250 TABLET, FILM COATED ORAL SEE ADMIN INSTRUCTIONS
Qty: 6 TABLET | Refills: 0 | Status: SHIPPED | OUTPATIENT
Start: 2021-11-08 | End: 2021-11-13

## 2021-11-08 RX ORDER — BENZONATATE 100 MG/1
100 CAPSULE ORAL 3 TIMES DAILY PRN
Qty: 30 CAPSULE | Refills: 0 | Status: SHIPPED | OUTPATIENT
Start: 2021-11-08 | End: 2021-12-10

## 2021-11-08 NOTE — PROGRESS NOTES
Oswaldo Cochran is a 79 y.o. male evaluated via telephone on 11/8/2021. Consent:  He and/or health care decision maker is aware that that he may receive a bill for this telephone service, depending on his insurance coverage, and has provided verbal consent to proceed: Yes      Documentation:  I communicated with the patient and/or health care decision maker about cough. Details of this discussion including any medical advice provided:     Pt with chest congestion,  rattling in the chest, cough productive of clear/bloody mucous, nasal congestion, myalgias, nausea without vomiting. He has had edema, but improved today. He sleeps in his recliner, which is chronic. He denies rhinorrhea, ear pain, sinus pain, HA, F/C, loss of taste or smell. Symptoms started 1 week ago. PLAN:  - check for covid   - start azith and tessalon    - f/u in 3 days - may be CHF      I affirm this is a Patient Initiated Episode with a Patient who has not had a related appointment within my department in the past 7 days or scheduled within the next 24 hours. Patient identification was verified at the start of the visit: Yes    Total Time: minutes: 11-20 minutes    The visit was conducted pursuant to the emergency declaration under the [address] [place] [address], [address] [place] [address] authority and the [employer] Act. Patient identification was verified, and a caregiver was present when appropriate. The patient was located in a state where the provider was credentialed to provide care.     Note: not billable if this call serves to triage the patient into an appointment for the relevant concern      Ashley Zhao MD

## 2021-11-11 ENCOUNTER — OFFICE VISIT (OUTPATIENT)
Dept: INTERNAL MEDICINE CLINIC | Age: 81
End: 2021-11-11
Payer: MEDICARE

## 2021-11-11 VITALS
RESPIRATION RATE: 18 BRPM | OXYGEN SATURATION: 92 % | SYSTOLIC BLOOD PRESSURE: 118 MMHG | HEART RATE: 79 BPM | DIASTOLIC BLOOD PRESSURE: 60 MMHG

## 2021-11-11 DIAGNOSIS — J41.0 SIMPLE CHRONIC BRONCHITIS (HCC): ICD-10-CM

## 2021-11-11 DIAGNOSIS — R60.0 LOWER EXTREMITY EDEMA: Primary | ICD-10-CM

## 2021-11-11 PROCEDURE — 1123F ACP DISCUSS/DSCN MKR DOCD: CPT | Performed by: INTERNAL MEDICINE

## 2021-11-11 PROCEDURE — G8484 FLU IMMUNIZE NO ADMIN: HCPCS | Performed by: INTERNAL MEDICINE

## 2021-11-11 PROCEDURE — G8926 SPIRO NO PERF OR DOC: HCPCS | Performed by: INTERNAL MEDICINE

## 2021-11-11 PROCEDURE — 99214 OFFICE O/P EST MOD 30 MIN: CPT | Performed by: INTERNAL MEDICINE

## 2021-11-11 PROCEDURE — G8427 DOCREV CUR MEDS BY ELIG CLIN: HCPCS | Performed by: INTERNAL MEDICINE

## 2021-11-11 PROCEDURE — 1036F TOBACCO NON-USER: CPT | Performed by: INTERNAL MEDICINE

## 2021-11-11 PROCEDURE — 3023F SPIROM DOC REV: CPT | Performed by: INTERNAL MEDICINE

## 2021-11-11 PROCEDURE — 4040F PNEUMOC VAC/ADMIN/RCVD: CPT | Performed by: INTERNAL MEDICINE

## 2021-11-11 PROCEDURE — G8417 CALC BMI ABV UP PARAM F/U: HCPCS | Performed by: INTERNAL MEDICINE

## 2021-11-11 RX ORDER — METOLAZONE 2.5 MG/1
2.5 TABLET ORAL DAILY
Qty: 30 TABLET | Refills: 3 | Status: SHIPPED | OUTPATIENT
Start: 2021-11-11

## 2021-11-11 RX ORDER — ALBUTEROL SULFATE 90 UG/1
AEROSOL, METERED RESPIRATORY (INHALATION)
Qty: 18 G | Refills: 2 | Status: SHIPPED | OUTPATIENT
Start: 2021-11-11

## 2021-11-11 NOTE — PROGRESS NOTES
Timmy [facility]  1940 11/11/21    SUBJECTIVE:    Pt continues to have a cough, though this has improved with the azith and tessalon. Cough is productive of clear sputum with flecks of red. Cough is bari severe in the evening. Edema is worse. He sleeps only partially reclined. He does have SOB with exertion. He does have PND. He has had wheezing on one occasion. OBJECTIVE:    /60   Pulse 79   Resp 18   SpO2 92%     Physical Exam  Constitutional:       Appearance: He is well-developed. Eyes:      General: No scleral icterus. Conjunctiva/sclera: Conjunctivae normal.   Cardiovascular:      Rate and Rhythm: Normal rate and regular rhythm. Heart sounds: Normal heart sounds. No murmur heard. Pulmonary:      Effort: Pulmonary effort is normal. No respiratory distress. Breath sounds: Normal breath sounds. No wheezing. 2+ edema to mid calf    (+) cobblestoning    No JVD    ASSESSMENT:    1. Lower extremity edema    2. Simple chronic bronchitis ([address]        PLAN:    Jinny Cowart was seen today for cough. Diagnoses and all orders for this visit:    Lower extremity edema - will add zaroxolyn. I am still not sure if the SOB and cough is from infx, chf, or PND. Exam is not too revealing. Will have close f/u next week with the addition of the zaroxolyn. Also encourage flonase, albuterol  -     metOLazone (ZAROXOLYN) 2.5 MG tablet;  Take 1 tablet by mouth daily Take 30 minutes prior to lasix    Simple chronic bronchitis (HCC)  -     albuterol sulfate  (90 Base) MCG/ACT inhaler; INHALE TWO PUFFS BY MOUTH (INTO THE LUNGS) EVERY 6 HOURS AS NEEDED FOR WHEEZING

## 2021-11-17 ENCOUNTER — OFFICE VISIT (OUTPATIENT)
Dept: INTERNAL MEDICINE CLINIC | Age: 81
End: 2021-11-17
Payer: MEDICARE

## 2021-11-17 VITALS
HEART RATE: 64 BPM | OXYGEN SATURATION: 95 % | DIASTOLIC BLOOD PRESSURE: 62 MMHG | RESPIRATION RATE: 18 BRPM | SYSTOLIC BLOOD PRESSURE: 104 MMHG

## 2021-11-17 DIAGNOSIS — R60.0 LOWER EXTREMITY EDEMA: Primary | ICD-10-CM

## 2021-11-17 PROCEDURE — 1123F ACP DISCUSS/DSCN MKR DOCD: CPT | Performed by: INTERNAL MEDICINE

## 2021-11-17 PROCEDURE — 1036F TOBACCO NON-USER: CPT | Performed by: INTERNAL MEDICINE

## 2021-11-17 PROCEDURE — G8417 CALC BMI ABV UP PARAM F/U: HCPCS | Performed by: INTERNAL MEDICINE

## 2021-11-17 PROCEDURE — G8427 DOCREV CUR MEDS BY ELIG CLIN: HCPCS | Performed by: INTERNAL MEDICINE

## 2021-11-17 PROCEDURE — 99213 OFFICE O/P EST LOW 20 MIN: CPT | Performed by: INTERNAL MEDICINE

## 2021-11-17 PROCEDURE — 4040F PNEUMOC VAC/ADMIN/RCVD: CPT | Performed by: INTERNAL MEDICINE

## 2021-11-17 PROCEDURE — G8484 FLU IMMUNIZE NO ADMIN: HCPCS | Performed by: INTERNAL MEDICINE

## 2021-11-17 NOTE — PROGRESS NOTES
Kip Alexander  1940 11/17/21    SUBJECTIVE:    Pt continues to have a cough intermittently productive of phlegm,  bari at night. Edema has improved with the addition of zaroxolyn. Edema is improved. He thinks that his breathing has improved with the zaroxolyn. Pt does have dementia  OBJECTIVE:    /62   Pulse 64   Resp 18   SpO2 95%     Physical Exam  Constitutional:       Appearance: He is well-developed. Eyes:      General: No scleral icterus. Conjunctiva/sclera: Conjunctivae normal.   Cardiovascular:      Rate and Rhythm: Normal rate and regular rhythm. Heart sounds: Normal heart sounds. No murmur heard. Pulmonary:      Effort: Pulmonary effort is normal. No respiratory distress. Breath sounds: Normal breath sounds. No wheezing. no edema    ASSESSMENT:    1. Lower extremity edema        PLAN:    Lindsay Hermosillo was seen today for foot pain. Diagnoses and all orders for this visit:    Lower extremity  - edema and SOB improved; check labs, cont diuretics  -     Basic Metabolic Panel; Future        Pt needs nursing services because of medical conditions including dementia. The patient requires SN. Patient is homebound because of dementia. Pt needs the services to monitor patient, assist with ADLS, and PT/OT.

## 2021-11-29 ENCOUNTER — TELEPHONE (OUTPATIENT)
Dept: INTERNAL MEDICINE CLINIC | Age: 81
End: 2021-11-29

## 2021-11-29 DIAGNOSIS — F03.90 DEMENTIA WITHOUT BEHAVIORAL DISTURBANCE, UNSPECIFIED DEMENTIA TYPE: Primary | ICD-10-CM

## 2021-11-29 NOTE — TELEPHONE ENCOUNTER
Pt walked into the office requesting a call from the provider or requesting to set up some home health care services for the pt . Please advise !

## 2021-12-03 ENCOUNTER — TELEPHONE (OUTPATIENT)
Dept: INTERNAL MEDICINE CLINIC | Age: 81
End: 2021-12-03

## 2021-12-03 NOTE — TELEPHONE ENCOUNTER
----- Message from Tram Lopez sent at 12/2/2021  2:50 PM EST -----  Subject: Message to Provider    QUESTIONS  Information for Provider? Zee from [employer] called in   and want to know if [facility] can have the pt last visit faxed over to [facility]   office . [phone] Att? Zee  ---------------------------------------------------------------------------  --------------  Valerie Jauregui INFO  What is the best way for the office to contact you? OK to leave message on   voicemail  Preferred Call Back Phone Number? [phone]  ---------------------------------------------------------------------------  --------------  SCRIPT ANSWERS  Relationship to Patient? Third Party  Representative Name?  zee

## 2021-12-03 NOTE — TELEPHONE ENCOUNTER
Hollie with [facility] called in wanting to know if you can do an addendum on the pt's last OV notes stating that the pt has dementia ? Please advise !

## 2021-12-07 ENCOUNTER — VIRTUAL VISIT (OUTPATIENT)
Dept: INTERNAL MEDICINE CLINIC | Age: 81
End: 2021-12-07
Payer: MEDICARE

## 2021-12-07 DIAGNOSIS — R60.0 LOWER EXTREMITY EDEMA: ICD-10-CM

## 2021-12-07 DIAGNOSIS — F03.90 DEMENTIA WITHOUT BEHAVIORAL DISTURBANCE, UNSPECIFIED DEMENTIA TYPE: Primary | ICD-10-CM

## 2021-12-07 PROCEDURE — 99442 PR PHYS/QHP TELEPHONE EVALUATION 11-20 MIN: CPT | Performed by: INTERNAL MEDICINE

## 2021-12-07 NOTE — PROGRESS NOTES
Mumtaz Machado is a 79 y.o. male evaluated via telephone on 12/7/2021. Consent:  He and/or health care decision maker is aware that that he may receive a bill for this telephone service, depending on his insurance coverage, and has provided verbal consent to proceed: Yes      Documentation:  I communicated with the patient and/or health care decision maker about edema. Details of this discussion including any medical advice provided:     Pt is having significantly difficulty with standing and walking. He stands to use the urinal. He is using a bedside commode. Edema is severe. He is having breakdown of the skin. He is eating very little. He is having pain in the sacrum and is starting to have bed sores. He denies any SOB, but he does not get much activity. PLAN:  - discussed with wife, will refer for hospice to visit pt and wife in the home       I affirm this is a Patient Initiated Episode with a Patient who has not had a related appointment within my department in the past 7 days or scheduled within the next 24 hours. Patient identification was verified at the start of the visit: Yes    Total Time: minutes: 11-20 minutes    The visit was conducted pursuant to the emergency declaration under the [address] [place] [address], [address] [place] [address] authority and the [employer] Act. Patient identification was verified, and a caregiver was present when appropriate. The patient was located in a state where the provider was credentialed to provide care.     Note: not billable if this call serves to triage the patient into an appointment for the relevant concern      Amrik Mcclellan MD

## 2021-12-08 ENCOUNTER — TELEPHONE (OUTPATIENT)
Dept: INTERNAL MEDICINE CLINIC | Age: 81
End: 2021-12-08

## 2021-12-08 NOTE — TELEPHONE ENCOUNTER
Magdalena Crowley with [facility] called in to see if you could place an order for the pt a hospital bed ?

## 2021-12-09 DIAGNOSIS — R05.9 COUGH: ICD-10-CM

## 2021-12-10 RX ORDER — BENZONATATE 100 MG/1
CAPSULE ORAL
Qty: 30 CAPSULE | Refills: 0 | Status: SHIPPED | OUTPATIENT
Start: 2021-12-10

## 2022-06-03 NOTE — ED TRIAGE NOTES
Pt to ED via squad. Pt states that he felt like he was going to \"pass out\" at home.  Pt c/o abd pain and n/v.
sudden onset